# Patient Record
Sex: FEMALE | Race: WHITE | NOT HISPANIC OR LATINO | Employment: FULL TIME | URBAN - METROPOLITAN AREA
[De-identification: names, ages, dates, MRNs, and addresses within clinical notes are randomized per-mention and may not be internally consistent; named-entity substitution may affect disease eponyms.]

---

## 2021-01-06 ENCOUNTER — CONSULT (OUTPATIENT)
Dept: SURGERY | Facility: CLINIC | Age: 61
End: 2021-01-06
Payer: COMMERCIAL

## 2021-01-06 VITALS
DIASTOLIC BLOOD PRESSURE: 62 MMHG | BODY MASS INDEX: 18.78 KG/M2 | SYSTOLIC BLOOD PRESSURE: 118 MMHG | HEART RATE: 94 BPM | HEIGHT: 63 IN | WEIGHT: 106 LBS | TEMPERATURE: 99.8 F

## 2021-01-06 DIAGNOSIS — K63.2 COLOCUTANEOUS FISTULA: Primary | ICD-10-CM

## 2021-01-06 DIAGNOSIS — K57.92 DIVERTICULITIS: ICD-10-CM

## 2021-01-06 PROCEDURE — 99244 OFF/OP CNSLTJ NEW/EST MOD 40: CPT | Performed by: SPECIALIST

## 2021-01-06 RX ORDER — IBUPROFEN 200 MG
200 TABLET ORAL EVERY 6 HOURS PRN
COMMUNITY
End: 2021-06-30 | Stop reason: HOSPADM

## 2021-01-06 RX ORDER — AMOXICILLIN AND CLAVULANATE POTASSIUM 875; 125 MG/1; MG/1
1 TABLET, FILM COATED ORAL EVERY 12 HOURS SCHEDULED
Qty: 28 TABLET | Refills: 0 | Status: SHIPPED | OUTPATIENT
Start: 2021-01-06 | End: 2021-01-20

## 2021-01-06 NOTE — LETTER
January 6, 2021     Zeeshan Plaza, DO  [address] [facility] [address]    Patient: Marizol Daniel   YOB: 1960   Date of Visit: 1/6/2021       Dear Sulma Donahue,     Thank you for referring Sharyn Burch to me for evaluation  Below are my notes for this consultation  If you have questions, please do not hesitate to call me  I look forward to following your patient along with you  Sincerely,    Pedro Pablo Lee MD        CC: No Recipients  Dianne Jung MD  1/6/2021  3:49 PM  Sign when Signing Visit  Chief Complaint:  Complicated diverticulitis with colocutaneous fistula      History of Present Illness:  Patient is a 60-year-old white female with history of complicated sigmoid diverticulitis  Apparently in September of 2020 she began to have left lower quadrant abdominal pain  She presented to the emergency room at [facility] where CT scan demonstrated diverticulitis with a possible fistula toward the inguinal canal   She was treated medically and ultimately discharged  A few days later she return to the emergency room with recurrence and increasing left lower quadrant pain  Repeat CT scan demonstrated an abscess  The patient underwent CT-guided abscess drainage with placement of a drain  She was treated with IV antibiotics switched to oral and discharged  She came back for re-evaluation and repeat CT scan  Initially there was improvement of the abscess but unfortunately it eventually recurred  Repeat IR drainage with a different catheter was performed  Patient underwent abscessogram x2 the last 1 showing almost complete resolution of left lower quadrant diverticular abscess   She eventually underwent drain removal or the drain fell out  Since then she has had persistent drainage from the drain site    She notified her physicians and they said just to keep it covered with a Band-Aid and that it would eventually heal   It has been over a month now and has not stopped  The drainage is brownish clearish fluid several cc a day  She was referred office today by her family physician for consideration of the situation  The patient states her bowels are moving  She denies fever chills or other subjective symptoms of sepsis  Past Medical History: History reviewed  No pertinent past medical history  Past Surgical History:  History reviewed  No pertinent surgical history  Allergies:  No Known Allergies      Medications:    Current Outpatient Medications:     ibuprofen (MOTRIN) 200 mg tablet, Take 200 mg by mouth every 6 (six) hours as needed, Disp: , Rfl:     sertraline (ZOLOFT) 50 mg tablet, Take 100 mg by mouth daily, Disp: , Rfl:     amoxicillin-clavulanate (AUGMENTIN) 875-125 mg per tablet, Take 1 tablet by mouth every 12 (twelve) hours for 14 days, Disp: 28 tablet, Rfl: 0      Social History:  Social History     Social History     Substance and Sexual Activity   Alcohol Use Yes    Alcohol/week: 2 0 standard drinks    Types: 1 Shots of liquor, 1 Cans of beer per week    Frequency: 4 or more times a week    Drinks per session: 5 or 6    Binge frequency: Daily or almost daily     Social History     Substance and Sexual Activity   Drug Use Never     Social History     Tobacco Use   Smoking Status Current Some Day Smoker    Packs/day: 1 00    Types: Cigarettes   Smokeless Tobacco Never Used         Family History:  History reviewed  No pertinent family history  Review of Systems:    As per the HPI  Drainage from drain site  No fever chills night sweats  No weight loss weight gain chest pain nausea vomiting diarrhea constipation shortness of breath dysuria hematuria headaches sore throat blurry vision double vision chronic cough dysuria hematuria etcetera  All other review of systems are negative        Vitals:  Vitals:    01/06/21 1038   BP: 118/62   Pulse: 94   Temp: 99 8 °F (37 7 °C)       Physical Exam:  The patient is diminutive slim white female 5 ft 3 in and 106 lb  She is awake alert in no distress  Vital signs as above  Skin warm dry  Head normocephalic and atraumatic  Eyes ARNEL a m  Intact  Ears nose within normal limits  Throat gag reflex intact  Neck no masses thyromegaly lymphadenopathy palpable  Back no CVA or spinal tenderness  Lungs clear to a and P  Cor regular rate and rhythm no murmurs carotid bruits  Abdomen flat firm  There is a fullness in the left lower quadrant that is somewhat tender to palpation  There is a small opening in the skin with a small amount of watery brownish drainage which is consistent with the former percutaneous drain site  No other tenderness is noted no masses present  Extremities arthritic changes are noted negative CC E  Neurologically A&O x3 cranial nerves 2-12 intact  Lymphatics no lymphadenopathy palpable  Lab Results: I have personally reviewed pertinent reports  See below  Imaging: I have personally reviewed pertinent reports  EKG, Pathology, and Other Studies: I have personally reviewed pertinent reports  No visits with results within 1 Day(s) from this visit  Latest known visit with results is:   No results found for any previous visit  Impression:  Complicated sigmoid diverticulitis with colocutaneous fistula  The concept of diverticulitis is explained to the patient along with diverticular abscess and colocutaneous fistula  Drawings are made to further explain this and she understands  The management of fistulas are discussed and what needs to be done to facilitate their closure  Usually fistulas were given 6-8 weeks to close spontaneously  After that something more aggressive (surgery) needs to be seriously considered  She understands and is actually quite grateful for the explanations    Plan:  Her bowels are moving and she has no distal obstruction  Small amount of drainage is coming through the fistula  Her nutrition seems to be adequate  Maintaining all of the above and adding a course of antibiotics may facilitate the closing of this fistula  There are not many more options at this time short of surgery  A 2 week course of antibiotics as ordered for her and will bring her back in 2 weeks for re-evaluation  Of course if she gets worse she should notify our office  She understands and we will make this so

## 2021-01-06 NOTE — PROGRESS NOTES
Chief Complaint:  Complicated diverticulitis with colocutaneous fistula      History of Present Illness:  Patient is a 64-year-old white female with history of complicated sigmoid diverticulitis  Apparently in September of 2020 she began to have left lower quadrant abdominal pain  She presented to the emergency room at [facility] where CT scan demonstrated diverticulitis with a possible fistula toward the inguinal canal   She was treated medically and ultimately discharged  A few days later she return to the emergency room with recurrence and increasing left lower quadrant pain  Repeat CT scan demonstrated an abscess  The patient underwent CT-guided abscess drainage with placement of a drain  She was treated with IV antibiotics switched to oral and discharged  She came back for re-evaluation and repeat CT scan  Initially there was improvement of the abscess but unfortunately it eventually recurred  Repeat IR drainage with a different catheter was performed  Patient underwent abscessogram x2 the last 1 showing almost complete resolution of left lower quadrant diverticular abscess   She eventually underwent drain removal or the drain fell out  Since then she has had persistent drainage from the drain site  She notified her physicians and they said just to keep it covered with a Band-Aid and that it would eventually heal   It has been over a month now and has not stopped  The drainage is brownish clearish fluid several cc a day  She was referred office today by her family physician for consideration of the situation  The patient states her bowels are moving  She denies fever chills or other subjective symptoms of sepsis  Past Medical History: History reviewed  No pertinent past medical history  Past Surgical History:  History reviewed  No pertinent surgical history        Allergies:  No Known Allergies      Medications:    Current Outpatient Medications:     ibuprofen (MOTRIN) 200 mg tablet, Take 200 mg by mouth every 6 (six) hours as needed, Disp: , Rfl:     sertraline (ZOLOFT) 50 mg tablet, Take 100 mg by mouth daily, Disp: , Rfl:     amoxicillin-clavulanate (AUGMENTIN) 875-125 mg per tablet, Take 1 tablet by mouth every 12 (twelve) hours for 14 days, Disp: 28 tablet, Rfl: 0      Social History:  Social History     Social History     Substance and Sexual Activity   Alcohol Use Yes    Alcohol/week: 2 0 standard drinks    Types: 1 Shots of liquor, 1 Cans of beer per week    Frequency: 4 or more times a week    Drinks per session: 5 or 6    Binge frequency: Daily or almost daily     Social History     Substance and Sexual Activity   Drug Use Never     Social History     Tobacco Use   Smoking Status Current Some Day Smoker    Packs/day: 1 00    Types: Cigarettes   Smokeless Tobacco Never Used         Family History:  History reviewed  No pertinent family history  Review of Systems:    As per the HPI  Drainage from drain site  No fever chills night sweats  No weight loss weight gain chest pain nausea vomiting diarrhea constipation shortness of breath dysuria hematuria headaches sore throat blurry vision double vision chronic cough dysuria hematuria etcetera  All other review of systems are negative  Vitals:  Vitals:    01/06/21 1038   BP: 118/62   Pulse: 94   Temp: 99 8 °F (37 7 °C)       Physical Exam:  The patient is diminutive slim white female 5 ft 3 in and 106 lb  She is awake alert in no distress  Vital signs as above  Skin warm dry  Head normocephalic and atraumatic  Eyes ARNEL a m  Intact  Ears nose within normal limits  Throat gag reflex intact  Neck no masses thyromegaly lymphadenopathy palpable  Back no CVA or spinal tenderness  Lungs clear to a and P  Cor regular rate and rhythm no murmurs carotid bruits  Abdomen flat firm  There is a fullness in the left lower quadrant that is somewhat tender to palpation    There is a small opening in the skin with a small amount of watery brownish drainage which is consistent with the former percutaneous drain site  No other tenderness is noted no masses present  Extremities arthritic changes are noted negative CC E  Neurologically A&O x3 cranial nerves 2-12 intact  Lymphatics no lymphadenopathy palpable  Lab Results: I have personally reviewed pertinent reports  See below  Imaging: I have personally reviewed pertinent reports  EKG, Pathology, and Other Studies: I have personally reviewed pertinent reports  No visits with results within 1 Day(s) from this visit  Latest known visit with results is:   No results found for any previous visit  Impression:  Complicated sigmoid diverticulitis with colocutaneous fistula  The concept of diverticulitis is explained to the patient along with diverticular abscess and colocutaneous fistula  Drawings are made to further explain this and she understands  The management of fistulas are discussed and what needs to be done to facilitate their closure  Usually fistulas were given 6-8 weeks to close spontaneously  After that something more aggressive (surgery) needs to be seriously considered  She understands and is actually quite grateful for the explanations    Plan:  Her bowels are moving and she has no distal obstruction  Small amount of drainage is coming through the fistula  Her nutrition seems to be adequate  Maintaining all of the above and adding a course of antibiotics may facilitate the closing of this fistula  There are not many more options at this time short of surgery  A 2 week course of antibiotics as ordered for her and will bring her back in 2 weeks for re-evaluation  Of course if she gets worse she should notify our office  She understands and we will make this so

## 2021-02-16 ENCOUNTER — OFFICE VISIT (OUTPATIENT)
Dept: SURGERY | Facility: CLINIC | Age: 61
End: 2021-02-16
Payer: COMMERCIAL

## 2021-02-16 VITALS
HEART RATE: 82 BPM | BODY MASS INDEX: 18.43 KG/M2 | TEMPERATURE: 98.6 F | SYSTOLIC BLOOD PRESSURE: 114 MMHG | DIASTOLIC BLOOD PRESSURE: 68 MMHG | HEIGHT: 63 IN | WEIGHT: 104 LBS

## 2021-02-16 DIAGNOSIS — K63.2 COLOCUTANEOUS FISTULA: Primary | ICD-10-CM

## 2021-02-16 DIAGNOSIS — K57.92 DIVERTICULITIS: ICD-10-CM

## 2021-02-16 PROCEDURE — 99213 OFFICE O/P EST LOW 20 MIN: CPT | Performed by: SPECIALIST

## 2021-02-16 RX ORDER — AMOXICILLIN AND CLAVULANATE POTASSIUM 875; 125 MG/1; MG/1
1 TABLET, FILM COATED ORAL EVERY 12 HOURS SCHEDULED
Qty: 28 TABLET | Refills: 0 | Status: SHIPPED | OUTPATIENT
Start: 2021-02-16 | End: 2021-03-02

## 2021-02-16 NOTE — PROGRESS NOTES
Alayna Elise is a 52-year-old white female with a history of complicated sigmoid diverticulitis  She was recently seen in regards to this January 6, 2021  She had a diverticular abscess drain x2 at [facility]   She was dissatisfied with the care there  After the last drainage when the drain was removed she developed drainage from the drain site and an obvious colocutaneous fistula  She was not favor of surgery at that time and the only thing available to her was the placed on antibiotics for a 2 week period   She says the drainage all but stopped by 2 weeks but then slowly recurred  It is primarily cloudy fluid no obvious stool  She is tolerating her diet  Her weight is stable  Her bowels are moving  Physical exam:  Middle-aged cachectic-appearing female who is awake  Alert and in no distress  Abdomen: There is about a 1 cm circular lesion of the left iliac crest with some minimal turbid fluid that is expressible  It is tender to palpation  There is no significant cellulitis around the area  the pathophysiology is once again discussed with her  When she was referred to the scene she was not in favor of surgery  Today she says if that is necessary then so be it  She also has questions   "could this be cancer"  It is most likely related to diverticulitis but she has never had a lower endoscopy  Eventual colonoscopy is essential and this was related to her  Whether she has surgery are does not have surgery she needs to have her colon evaluated to essentially rule out cancer and to evaluate the rest of the colon  She understands  Impression:  Complicated diverticulitis with colocutaneous fistula  Plan: Will place her on a 2 week course of antibiotics once again to see if this ultimately closes the fistula  She is also referred for colonoscopy  We will see her after in about 2 weeks and then proceed from that  She understands    She is quite thankful that weeks plain things to her  A prescription for antibiotics is E scripted to her pharmacy an appointment for colonoscopy is made

## 2021-03-09 ENCOUNTER — OFFICE VISIT (OUTPATIENT)
Dept: SURGERY | Facility: CLINIC | Age: 61
End: 2021-03-09
Payer: COMMERCIAL

## 2021-03-09 VITALS
SYSTOLIC BLOOD PRESSURE: 108 MMHG | BODY MASS INDEX: 18.61 KG/M2 | HEIGHT: 63 IN | WEIGHT: 105 LBS | HEART RATE: 85 BPM | TEMPERATURE: 97.2 F | DIASTOLIC BLOOD PRESSURE: 64 MMHG

## 2021-03-09 DIAGNOSIS — Z12.11 SCREEN FOR COLON CANCER: Primary | ICD-10-CM

## 2021-03-09 DIAGNOSIS — K63.2 COLOCUTANEOUS FISTULA: ICD-10-CM

## 2021-03-09 PROCEDURE — 99204 OFFICE O/P NEW MOD 45 MIN: CPT | Performed by: SURGERY

## 2021-04-12 ENCOUNTER — ANESTHESIA EVENT (OUTPATIENT)
Dept: GASTROENTEROLOGY | Facility: HOSPITAL | Age: 61
End: 2021-04-12

## 2021-04-12 PROBLEM — F32.A DEPRESSION: Status: ACTIVE | Noted: 2021-04-12

## 2021-04-12 PROBLEM — F17.200 SMOKING: Status: ACTIVE | Noted: 2021-04-12

## 2021-04-12 NOTE — ANESTHESIA PREPROCEDURE EVALUATION
Procedure:  COLONOSCOPY    Relevant Problems   ANESTHESIA (within normal limits)      CARDIO (within normal limits)      ENDO (within normal limits)      GI/HEPATIC  bowel prep    pt has stoma      /RENAL (within normal limits)      GYN  PM      HEMATOLOGY (within normal limits)      MUSCULOSKELETAL (within normal limits)      NEURO/PSYCH   (+) Depression      PULMONARY  smoking cessation stressed   (+) Smoking   (-) Sleep apnea   (-) URI (upper respiratory infection)        Physical Exam    Airway    Mallampati score: II  TM Distance: >3 FB  Neck ROM: full     Dental       Cardiovascular  Cardiovascular exam normal    Pulmonary  Pulmonary exam normal     Other Findings  Fixed upper and lower teeth and in good repair      Anesthesia Plan  ASA Score- 2     Anesthesia Type- IV sedation with anesthesia with ASA Monitors  Additional Monitors:   Airway Plan:           Plan Factors-Exercise tolerance (METS): >4 METS  Chart reviewed  EKG reviewed  Existing labs reviewed  Patient summary reviewed  Patient is a current smoker  Patient instructed to abstain from smoking on day of procedure  Patient did not smoke on day of surgery  Obstructive sleep apnea risk education given perioperatively  Induction- intravenous  Postoperative Plan-     Informed Consent- Anesthetic plan and risks discussed with patient and spouse  I personally reviewed this patient with the CRNA  Discussed and agreed on the Anesthesia Plan with the BERTRAND Rae

## 2021-04-14 ENCOUNTER — ANESTHESIA (OUTPATIENT)
Dept: GASTROENTEROLOGY | Facility: HOSPITAL | Age: 61
End: 2021-04-14

## 2021-04-14 ENCOUNTER — HOSPITAL ENCOUNTER (OUTPATIENT)
Dept: GASTROENTEROLOGY | Facility: HOSPITAL | Age: 61
Setting detail: OUTPATIENT SURGERY
Discharge: HOME | End: 2021-04-14
Attending: SURGERY
Payer: COMMERCIAL

## 2021-04-14 VITALS
OXYGEN SATURATION: 97 % | DIASTOLIC BLOOD PRESSURE: 93 MMHG | TEMPERATURE: 97.7 F | SYSTOLIC BLOOD PRESSURE: 177 MMHG | HEART RATE: 72 BPM | RESPIRATION RATE: 16 BRPM

## 2021-04-14 DIAGNOSIS — Z12.11 SCREEN FOR COLON CANCER: ICD-10-CM

## 2021-04-14 PROCEDURE — 88305 TISSUE EXAM BY PATHOLOGIST: CPT | Performed by: PATHOLOGY

## 2021-04-14 PROCEDURE — 45385 COLONOSCOPY W/LESION REMOVAL: CPT | Performed by: SURGERY

## 2021-04-14 RX ORDER — PROPOFOL 10 MG/ML
INJECTION, EMULSION INTRAVENOUS AS NEEDED
Status: DISCONTINUED | OUTPATIENT
Start: 2021-04-14 | End: 2021-04-14

## 2021-04-14 RX ORDER — SODIUM CHLORIDE 9 MG/ML
125 INJECTION, SOLUTION INTRAVENOUS CONTINUOUS
Status: DISCONTINUED | OUTPATIENT
Start: 2021-04-14 | End: 2021-04-18 | Stop reason: HOSPADM

## 2021-04-14 RX ORDER — LIDOCAINE HYDROCHLORIDE 10 MG/ML
INJECTION, SOLUTION EPIDURAL; INFILTRATION; INTRACAUDAL; PERINEURAL AS NEEDED
Status: DISCONTINUED | OUTPATIENT
Start: 2021-04-14 | End: 2021-04-14

## 2021-04-14 RX ADMIN — PROPOFOL 30 MG: 10 INJECTION, EMULSION INTRAVENOUS at 12:55

## 2021-04-14 RX ADMIN — PROPOFOL 30 MG: 10 INJECTION, EMULSION INTRAVENOUS at 12:58

## 2021-04-14 RX ADMIN — PROPOFOL 30 MG: 10 INJECTION, EMULSION INTRAVENOUS at 13:04

## 2021-04-14 RX ADMIN — PROPOFOL 20 MG: 10 INJECTION, EMULSION INTRAVENOUS at 13:01

## 2021-04-14 RX ADMIN — SODIUM CHLORIDE 125 ML/HR: 0.9 INJECTION, SOLUTION INTRAVENOUS at 11:36

## 2021-04-14 RX ADMIN — PROPOFOL 40 MG: 10 INJECTION, EMULSION INTRAVENOUS at 13:07

## 2021-04-14 RX ADMIN — PROPOFOL 30 MG: 10 INJECTION, EMULSION INTRAVENOUS at 13:06

## 2021-04-14 RX ADMIN — LIDOCAINE HYDROCHLORIDE 50 MG: 10 INJECTION, SOLUTION EPIDURAL; INFILTRATION; INTRACAUDAL; PERINEURAL at 12:50

## 2021-04-14 RX ADMIN — PROPOFOL 120 MG: 10 INJECTION, EMULSION INTRAVENOUS at 12:50

## 2021-04-14 RX ADMIN — PROPOFOL 30 MG: 10 INJECTION, EMULSION INTRAVENOUS at 13:10

## 2021-04-14 NOTE — ANESTHESIA POSTPROCEDURE EVALUATION
Post-Op Assessment Note    CV Status:  Stable  Pain Score: 0    Pain management: adequate     Mental Status:  Alert and awake   Hydration Status:  Euvolemic   PONV Controlled:  Controlled   Airway Patency:  Patent      Post Op Vitals Reviewed: Yes      Staff: CRNA         No complications documented      BP   155/90   Temp      Pulse 69   Resp 18   SpO2 100

## 2021-04-14 NOTE — DISCHARGE INSTRUCTIONS
Colonoscopy   WHAT YOU NEED TO KNOW:   A colonoscopy is a procedure to examine the inside of your colon (intestine) with a scope  Polyps or tissue growths may have been removed during your colonoscopy  It is normal to feel bloated and to have some abdominal discomfort  You should be passing gas  If you have hemorrhoids or you had polyps removed, you may have a small amount of bleeding  DISCHARGE INSTRUCTIONS:   Call your doctor if:   · You have a large amount of bright red blood in your bowel movements  · Your abdomen is hard and firm and you have severe pain  · You have sudden trouble breathing  · You develop a rash or hives  · You have a fever within 24 hours of your procedure  · You have not had a bowel movement for 3 days after your procedure  · You have questions or concerns about your condition or care  After your colonoscopy:   · Do not lift, strain, or run  for 3 days  · Rest as much as possible  You have been given medicine to relax you  Do not  drive or make important decisions for at least 24 hours  Return to your normal activity as directed  · Relieve gas and discomfort from bloating  by lying on your left side with a heating pad on your abdomen  You may need to take short walks to help the gas move out  Eat small meals until bloating is relieved  If you had polyps removed: For 7 days after your procedure:  · Do not  take aspirin  · Do not  go on long car rides  Help prevent constipation:   · Eat a variety of healthy foods  Healthy foods include fruit, vegetables, whole-grain breads, low-fat dairy products, beans, lean meat, and fish  Ask if you need to be on a special diet  Your healthcare provider may recommend that you eat high-fiber foods such as cooked beans  Fiber helps you have regular bowel movements  · Drink liquids as directed  Adults should drink between 9 and 13 eight-ounce cups of liquid every day  Ask what amount is best for you   For most people, good liquids to drink are water, juice, and milk  · Exercise as directed  Talk to your healthcare provider about the best exercise plan for you  Exercise can help prevent constipation, decrease your blood pressure and improve your health  Follow up with your healthcare provider as directed:  Write down your questions so you remember to ask them during your visits  © Copyright [address] Information is for End User's use only and may not be sold, redistributed or otherwise used for commercial purposes  All illustrations and images included in CareNotes® are the copyrighted property of A D A M , Inc  or [address] [place], [place] [address]andrew Martell   The above information is an  only  It is not intended as medical advice for individual conditions or treatments  Talk to your doctor, nurse or pharmacist before following any medical regimen to see if it is safe and effective for you

## 2021-04-14 NOTE — H&P
Assessment/Plan:    She has a colocutaneous fistula after diverticulitis  No previous lower endoscopy  Discussed risks of the procedure including abdominal pain/ bloating, bleeding and very low risk of perforation  Discussed possibility of diagnosis of colon cancer in the setting of diverticulitis  She is interested in surgical resection so this could potentially be coordinated as a preop screening  Discussed bowel prep and instructions were given  We will send a prescription for Suprep to her pharmacy      No problem-specific Assessment & Plan notes found for this encounter          Diagnoses and all orders for this visit:     Screen for colon cancer  -     Colonoscopy; Future     Colocutaneous fistula     Other orders  -     Diet NPO; Sips with meds; Standing  -     Void on call to OR; Standing  -     Insert peripheral IV; Standing            Subjective:       Patient ID: Bridget Moya is a 62 y o  female       She presents with colocutaneous fistula after recent diagnosis of diverticulitis  She has never had a colonoscopy  She reports daily drainage from the colo cutaneous fistula that is light brown or tan color, increases if she is more active  Does not change with diet  She reports a 20 lb weight loss over the last 6-8 months  No blood in her stool, rectal pain or current abdominal pain  No family history of colon cancer          The following portions of the patient's history were reviewed and updated as appropriate:   She  has no past medical history on file  She There are no active problems to display for this patient      She  has no past surgical history on file  Her family history is not on file  She  reports that she has been smoking cigarettes  She has been smoking about 1 00 pack per day  She has never used smokeless tobacco  She reports current alcohol use of about 2 0 standard drinks of alcohol per week  She reports that she does not use drugs           Current Outpatient Medications Medication Sig Dispense Refill    ibuprofen (MOTRIN) 200 mg tablet Take 200 mg by mouth every 6 (six) hours as needed        sertraline (ZOLOFT) 50 mg tablet Take 100 mg by mouth daily          No current facility-administered medications for this visit        She has No Known Allergies        Review of Systems   Skin: Positive for wound  All other systems reviewed and are negative          Objective:        /64 (BP Location: Left arm, Patient Position: Sitting, Cuff Size: Adult)   Pulse 85   Temp (!) 97 2 °F (36 2 °C) (Tympanic)   Ht 5' 3" (1 6 m)   Wt 47 6 kg (105 lb)   BMI 18 60 kg/m²             Physical Exam  Vitals signs reviewed  Constitutional:       General: She is not in acute distress  Appearance: Normal appearance  She is not toxic-appearing  HENT:      Head: Normocephalic and atraumatic  Eyes:      Extraocular Movements: Extraocular movements intact  Conjunctiva/sclera: Conjunctivae normal       Pupils: Pupils are equal, round, and reactive to light  Neck:      Musculoskeletal: Normal range of motion and neck supple  Cardiovascular:      Rate and Rhythm: Normal rate and regular rhythm  Pulmonary:      Effort: Pulmonary effort is normal  No respiratory distress  Breath sounds: Normal breath sounds  Abdominal:      General: Abdomen is flat  Palpations: Abdomen is soft  There is mass  Tenderness: There is no abdominal tenderness  There is no guarding  Musculoskeletal: Normal range of motion  General: No swelling or tenderness  Skin:     General: Skin is warm and dry  Neurological:      General: No focal deficit present  Mental Status: She is alert and oriented to person, place, and time  Psychiatric:         Mood and Affect: Mood normal          Behavior: Behavior normal          Thought Content:  Thought content normal          Judgment: Judgment normal

## 2021-05-11 ENCOUNTER — OFFICE VISIT (OUTPATIENT)
Dept: SURGERY | Facility: CLINIC | Age: 61
End: 2021-05-11
Payer: COMMERCIAL

## 2021-05-11 VITALS
SYSTOLIC BLOOD PRESSURE: 108 MMHG | HEART RATE: 84 BPM | HEIGHT: 63 IN | BODY MASS INDEX: 18.43 KG/M2 | WEIGHT: 104 LBS | DIASTOLIC BLOOD PRESSURE: 72 MMHG | TEMPERATURE: 99 F

## 2021-05-11 DIAGNOSIS — K57.92 DIVERTICULITIS: ICD-10-CM

## 2021-05-11 DIAGNOSIS — K63.2 COLOCUTANEOUS FISTULA: Primary | ICD-10-CM

## 2021-05-11 PROCEDURE — 99214 OFFICE O/P EST MOD 30 MIN: CPT | Performed by: SPECIALIST

## 2021-05-11 NOTE — LETTER
May 11, 2021     Ruby Riggs DO  [address] [facility] [id number]    Patient: Bruce Stout   YOB: 1960   Date of Visit: 5/11/2021       Dear   Ivone Mcclellan,    Thank you for referring Jeff Montemayor to me for evaluation  Below are the relevant portions of my H&P  If you have questions, please do not hesitate to call me  I look forward to following Marlene along with you  Sincerely,     Nenita Correa MD        CC: No Recipients  Veronica Mcintosh MD  5/11/2021  6:16 PM  Signed  Chief Complaint:  Complicated diverticulitis with colocutaneous fistula        History of Present Illness:  Patient is a 27-year-old white female with history of complicated sigmoid diverticulitis  Apparently in September of 2020 she began to have left lower quadrant abdominal pain  She presented to the emergency room at [facility] where CT scan demonstrated diverticulitis with a possible fistula toward the inguinal canal   She was treated medically and ultimately discharged  A few days later she return to the emergency room with recurrence and increasing left lower quadrant pain  Repeat CT scan demonstrated an abscess  The patient underwent CT-guided abscess drainage with placement of a drain  She was treated with IV antibiotics switched to oral and discharged  She came back for re-evaluation and repeat CT scan  Initially there was improvement of the abscess but unfortunately it eventually recurred  Repeat IR drainage with a different catheter was performed  Patient underwent abscessogram x2 the last 1 showing almost complete resolution of left lower quadrant diverticular abscess   She eventually underwent drain removal or the drain fell out  Since then she has had persistent drainage from the drain site    She notified her physicians and they said just to keep it covered with a Band-Aid and that it would eventually heal  Unfortunately it did not        She was referred office  by her family physician for consideration of the situation      The patient states her bowels are moving  She denies fever chills or other subjective symptoms of sepsis        She was initially treated with some oral antibiotics to see if the fistula  would spontaneously close  Unfortunately it did not  Subsequent she was scheduled for colonoscopy but colonoscopy was unsuccessful due to a stricture in the sigmoid colon  She was originally again surgery but unfortunately that is only recourse at this time and she is here today to discuss scheduling surgery  Past Medical History:   Medical History   History reviewed  No pertinent past medical history            Past Surgical History:    Surgical History   History reviewed  No pertinent surgical history            Allergies:  No Known Allergies        Medications:    Current Outpatient Medications:     ibuprofen (MOTRIN) 200 mg tablet, Take 200 mg by mouth every 6 (six) hours as needed, Disp: , Rfl:     sertraline (ZOLOFT) 50 mg tablet, Take 100 mg by mouth daily, Disp: , Rfl:     amoxicillin-clavulanate (AUGMENTIN) 875-125 mg per tablet, Take 1 tablet by mouth every 12 (twelve) hours for 14 days, Disp: 28 tablet, Rfl: 0        Social History:    Social History            Social History           Substance and Sexual Activity   Alcohol Use Yes    Alcohol/week: 2 0 standard drinks    Types: 1 Shots of liquor, 1 Cans of beer per week    Frequency: 4 or more times a week    Drinks per session: 5 or 6    Binge frequency: Daily or almost daily      Social History          Substance and Sexual Activity   Drug Use Never      Social History           Tobacco Use   Smoking Status Current Some Day Smoker    Packs/day: 1 00    Types: Cigarettes   Smokeless Tobacco Never Used            Family History:  History reviewed  No pertinent family history         Review of Systems:    As per the HPI  Drainage from drain site  No fever chills night sweats    No weight loss weight gain chest pain nausea vomiting diarrhea constipation shortness of breath dysuria hematuria headaches sore throat blurry vision double vision chronic cough dysuria hematuria etcetera  All other review of systems are negative         Vitals:      Vitals:     01/06/21 1038   BP: 118/62   Pulse: 94   Temp: 99 8 °F (37 7 °C)         Physical Exam:  The patient is diminutive slim white female 5 ft 3 in and 106 lb  She is awake alert in no distress  Vital signs as above  Skin warm dry  Head normocephalic and atraumatic  Eyes ARNEL a m  Intact  Ears nose within normal limits  Throat gag reflex intact  Neck no masses thyromegaly lymphadenopathy palpable  Back no CVA or spinal tenderness  Lungs clear to a and P  Cor regular rate and rhythm no murmurs carotid bruits  Abdomen flat firm  There is a fullness in the left lower quadrant that is somewhat tender to palpation  There is a small opening in the skin with a small amount of watery brownish drainage which is consistent with the former percutaneous drain site  No other tenderness is noted no masses present  Extremities arthritic changes are noted negative CC E  Neurologically A&O x3 cranial nerves 2-12 intact  Lymphatics no lymphadenopathy palpable         Lab Results: I have personally reviewed pertinent reports  See below  Imaging: I have personally reviewed pertinent reports  EKG, Pathology, and Other Studies: I have personally reviewed pertinent reports       No visits with results within 1 Day(s) from this visit  Latest known visit with results is:   No results found for any previous visit             Impression:  Complicated sigmoid diverticulitis with colocutaneous fistula      The concept of diverticulitis is explained to the patient along with diverticular abscess and colocutaneous fistula    Drawings are made to further explain this and she understands       The management of fistulas are discussed and what needs to be done to facilitate their closure  Usually fistulas were given 6-8 weeks to close spontaneously  After that something more aggressive (surgery) needs to be seriously considered      She understands and is actually quite grateful for the explanations     Plan: At this point her only recourse is surgery  We will attempt to prep her bowel  We will attempt laparoscopic sigmoid colon resection at the earliest possible date

## 2021-05-11 NOTE — H&P
Chief Complaint:  Complicated diverticulitis with colocutaneous fistula        History of Present Illness:  Patient is a 41-year-old white female with history of complicated sigmoid diverticulitis  Apparently in September of 2020 she began to have left lower quadrant abdominal pain  She presented to the emergency room at [facility] where CT scan demonstrated diverticulitis with a possible fistula toward the inguinal canal   She was treated medically and ultimately discharged  A few days later she return to the emergency room with recurrence and increasing left lower quadrant pain  Repeat CT scan demonstrated an abscess  The patient underwent CT-guided abscess drainage with placement of a drain  She was treated with IV antibiotics switched to oral and discharged  She came back for re-evaluation and repeat CT scan  Initially there was improvement of the abscess but unfortunately it eventually recurred  Repeat IR drainage with a different catheter was performed  Patient underwent abscessogram x2 the last 1 showing almost complete resolution of left lower quadrant diverticular abscess   She eventually underwent drain removal or the drain fell out  Since then she has had persistent drainage from the drain site  She notified her physicians and they said just to keep it covered with a Band-Aid and that it would eventually heal  Unfortunately it did not        She was referred office  by her family physician for consideration of the situation      The patient states her bowels are moving  She denies fever chills or other subjective symptoms of sepsis        She was initially treated with some oral antibiotics to see if the fistula  would spontaneously close  Unfortunately it did not  Subsequent she was scheduled for colonoscopy but colonoscopy was unsuccessful due to a stricture in the sigmoid colon      She was originally again surgery but unfortunately that is only recourse at this time and she is here today to discuss scheduling surgery  Past Medical History:   Medical History   History reviewed  No pertinent past medical history            Past Surgical History:    Surgical History   History reviewed  No pertinent surgical history            Allergies:  No Known Allergies        Medications:    Current Outpatient Medications:     ibuprofen (MOTRIN) 200 mg tablet, Take 200 mg by mouth every 6 (six) hours as needed, Disp: , Rfl:     sertraline (ZOLOFT) 50 mg tablet, Take 100 mg by mouth daily, Disp: , Rfl:     amoxicillin-clavulanate (AUGMENTIN) 875-125 mg per tablet, Take 1 tablet by mouth every 12 (twelve) hours for 14 days, Disp: 28 tablet, Rfl: 0        Social History:    Social History            Social History           Substance and Sexual Activity   Alcohol Use Yes    Alcohol/week: 2 0 standard drinks    Types: 1 Shots of liquor, 1 Cans of beer per week    Frequency: 4 or more times a week    Drinks per session: 5 or 6    Binge frequency: Daily or almost daily      Social History          Substance and Sexual Activity   Drug Use Never      Social History           Tobacco Use   Smoking Status Current Some Day Smoker    Packs/day: 1 00    Types: Cigarettes   Smokeless Tobacco Never Used            Family History:  History reviewed  No pertinent family history         Review of Systems:    As per the HPI  Drainage from drain site  No fever chills night sweats  No weight loss weight gain chest pain nausea vomiting diarrhea constipation shortness of breath dysuria hematuria headaches sore throat blurry vision double vision chronic cough dysuria hematuria etcetera  All other review of systems are negative         Vitals:      Vitals:     01/06/21 1038   BP: 118/62   Pulse: 94   Temp: 99 8 °F (37 7 °C)         Physical Exam:  The patient is diminutive slim white female 5 ft 3 in and 106 lb    She is awake alert in no distress  Vital signs as above  Skin warm dry  Head normocephalic and atraumatic  Eyes ARNEL a m  Intact  Ears nose within normal limits  Throat gag reflex intact  Neck no masses thyromegaly lymphadenopathy palpable  Back no CVA or spinal tenderness  Lungs clear to a and P  Cor regular rate and rhythm no murmurs carotid bruits  Abdomen flat firm  There is a fullness in the left lower quadrant that is somewhat tender to palpation  There is a small opening in the skin with a small amount of watery brownish drainage which is consistent with the former percutaneous drain site  No other tenderness is noted no masses present  Extremities arthritic changes are noted negative CC E  Neurologically A&O x3 cranial nerves 2-12 intact  Lymphatics no lymphadenopathy palpable         Lab Results: I have personally reviewed pertinent reports  See below  Imaging: I have personally reviewed pertinent reports  EKG, Pathology, and Other Studies: I have personally reviewed pertinent reports       No visits with results within 1 Day(s) from this visit  Latest known visit with results is:   No results found for any previous visit             Impression:  Complicated sigmoid diverticulitis with colocutaneous fistula      The concept of diverticulitis is explained to the patient along with diverticular abscess and colocutaneous fistula  Drawings are made to further explain this and she understands       The management of fistulas are discussed and what needs to be done to facilitate their closure  Usually fistulas were given 6-8 weeks to close spontaneously  After that something more aggressive (surgery) needs to be seriously considered      She understands and is actually quite grateful for the explanations     Plan: At this point her only recourse is surgery  We will attempt to prep her bowel  We will attempt laparoscopic sigmoid colon resection at the earliest possible date

## 2021-06-08 ENCOUNTER — ANESTHESIA EVENT (OUTPATIENT)
Dept: PERIOP | Facility: HOSPITAL | Age: 61
DRG: 329 | End: 2021-06-08
Payer: COMMERCIAL

## 2021-06-08 NOTE — PRE-PROCEDURE INSTRUCTIONS
Pre-Surgery Instructions:   Medication Instructions    ibuprofen (MOTRIN) 200 mg tablet Instructed to avoid all ASA/NSAIDs and OTC Vit/Supp from now until after surgery  Tylenol ok prn    sertraline (ZOLOFT) 50 mg tablet Instructed to take per normal schedule including DOS with sips water    Pre op,medications and showering instructions reviewed-Patient has hibiclens  Instructed to avoid all ASA/NSAIDs and OTC Vit/Supp from now until after surgery  Tylenol ok prn  Pt does have Bowel Prep instructions  Pt  Verbalized an understanding of all instructions reviewed and offers no concerns at this time

## 2021-06-18 ENCOUNTER — CLINICAL SUPPORT (OUTPATIENT)
Dept: URGENT CARE | Facility: MEDICAL CENTER | Age: 61
End: 2021-06-18
Payer: COMMERCIAL

## 2021-06-18 DIAGNOSIS — K63.2 COLOCUTANEOUS FISTULA: ICD-10-CM

## 2021-06-18 LAB
ATRIAL RATE: 86 BPM
P AXIS: 74 DEGREES
PR INTERVAL: 146 MS
QRS AXIS: 74 DEGREES
QRSD INTERVAL: 78 MS
QT INTERVAL: 390 MS
QTC INTERVAL: 466 MS
T WAVE AXIS: 62 DEGREES
VENTRICULAR RATE: 86 BPM

## 2021-06-18 PROCEDURE — U0003 INFECTIOUS AGENT DETECTION BY NUCLEIC ACID (DNA OR RNA); SEVERE ACUTE RESPIRATORY SYNDROME CORONAVIRUS 2 (SARS-COV-2) (CORONAVIRUS DISEASE [COVID-19]), AMPLIFIED PROBE TECHNIQUE, MAKING USE OF HIGH THROUGHPUT TECHNOLOGIES AS DESCRIBED BY CMS-2020-01-R: HCPCS | Performed by: SPECIALIST

## 2021-06-18 PROCEDURE — 93010 ELECTROCARDIOGRAM REPORT: CPT | Performed by: INTERNAL MEDICINE

## 2021-06-18 PROCEDURE — U0005 INFEC AGEN DETEC AMPLI PROBE: HCPCS | Performed by: SPECIALIST

## 2021-06-18 PROCEDURE — 93005 ELECTROCARDIOGRAM TRACING: CPT

## 2021-06-19 PROBLEM — E78.5 HYPERLIPIDEMIA: Status: ACTIVE | Noted: 2021-06-19

## 2021-06-19 PROBLEM — F41.9 ANXIETY: Status: ACTIVE | Noted: 2021-06-19

## 2021-06-19 NOTE — ANESTHESIA PREPROCEDURE EVALUATION
Procedure:  RESECTION COLON SIGMOID LAPAROSCOPIC (N/A Abdomen)    Relevant Problems   ANESTHESIA (within normal limits)      CARDIO   (+) Hyperlipidemia   (-) Chest pain   (-) Dysrhythmias   (-) HTN (hypertension)      ENDO (within normal limits)      /RENAL (within normal limits)      MUSCULOSKELETAL (within normal limits)      NEURO/PSYCH   (+) Anxiety   (+) Depression      PULMONARY   (+) Smoking (1PPD for many years )        Physical Exam    Airway    Mallampati score: II  TM Distance: >3 FB  Neck ROM: full     Dental   Comment: No upper teeth   poor oral hygiene ,     Cardiovascular  Cardiovascular exam normal    Pulmonary  Pulmonary exam normal     Other Findings        Anesthesia Plan  ASA Score- 3     Anesthesia Type- general with ASA Monitors  Additional Monitors:   Airway Plan: ETT  Plan Factors-Exercise tolerance (METS): >4 METS  Chart reviewed  EKG reviewed  Existing labs reviewed  Patient is a current smoker  Patient instructed to abstain from smoking on day of procedure  Patient smoked on day of surgery  Induction- intravenous  Postoperative Plan- Plan for postoperative opioid use  Informed Consent- Anesthetic plan and risks discussed with patient  I personally reviewed this patient with the CRNA  Discussed and agreed on the Anesthesia Plan with the CRNA  Harshil Hassan         No results found for: HGBA1C    No results found for: NA, K, CL, CO2, ANIONGAP, BUN, CREATININE, GLUCOSE, GLUF, CALCIUM, CORRECTEDCA, AST, ALT, ALKPHOS, PROT, BILITOT, EGFR    No results found for: WBC, HGB, HCT, MCV, PLT  Cbc and cmp reviewed     Narrative & Impression   Normal sinus rhythm  Normal ECG  No previous ECGs available  Confirmed by Sisi Dobbins ([id number]) on 6/18/2021 1:41:12      covid negative       Labs reviewed

## 2021-06-21 ENCOUNTER — ANESTHESIA (OUTPATIENT)
Dept: PERIOP | Facility: HOSPITAL | Age: 61
DRG: 329 | End: 2021-06-21
Payer: COMMERCIAL

## 2021-06-21 ENCOUNTER — HOSPITAL ENCOUNTER (INPATIENT)
Facility: HOSPITAL | Age: 61
LOS: 4 days | DRG: 329 | End: 2021-06-25
Attending: SPECIALIST | Admitting: SPECIALIST
Payer: COMMERCIAL

## 2021-06-21 DIAGNOSIS — J96.01 ACUTE RESPIRATORY FAILURE WITH HYPOXIA (HCC): Primary | ICD-10-CM

## 2021-06-21 DIAGNOSIS — K63.2 COLOCUTANEOUS FISTULA: ICD-10-CM

## 2021-06-21 DIAGNOSIS — R77.8 ELEVATED TROPONIN: ICD-10-CM

## 2021-06-21 PROBLEM — K57.20 PERFORATION OF SIGMOID COLON DUE TO DIVERTICULITIS: Status: ACTIVE | Noted: 2021-06-21

## 2021-06-21 LAB
ABO GROUP BLD: NORMAL
ABO GROUP BLD: NORMAL
ALBUMIN SERPL BCP-MCNC: 2 G/DL (ref 3–5.2)
ALP SERPL-CCNC: 42 U/L (ref 43–122)
ALT SERPL W P-5'-P-CCNC: 15 U/L
ANION GAP SERPL CALCULATED.3IONS-SCNC: 6 MMOL/L (ref 5–14)
ANISOCYTOSIS BLD QL SMEAR: PRESENT
AST SERPL W P-5'-P-CCNC: 31 U/L (ref 14–36)
BASOPHILS # BLD AUTO: 0 THOUSANDS/ΜL (ref 0–0.1)
BASOPHILS NFR BLD AUTO: 0 % (ref 0–1)
BILIRUB SERPL-MCNC: 0.26 MG/DL
BLD GP AB SCN SERPL QL: NEGATIVE
BUN SERPL-MCNC: 11 MG/DL (ref 5–25)
CALCIUM ALBUM COR SERPL-MCNC: 9.4 MG/DL (ref 8.3–10.1)
CALCIUM SERPL-MCNC: 7.8 MG/DL (ref 8.4–10.2)
CHLORIDE SERPL-SCNC: 105 MMOL/L (ref 97–108)
CO2 SERPL-SCNC: 24 MMOL/L (ref 22–30)
CREAT SERPL-MCNC: 0.59 MG/DL (ref 0.6–1.2)
EOSINOPHIL # BLD AUTO: 0 THOUSAND/ΜL (ref 0–0.4)
EOSINOPHIL NFR BLD AUTO: 0 % (ref 0–6)
ERYTHROCYTE [DISTWIDTH] IN BLOOD BY AUTOMATED COUNT: 15.9 %
GFR SERPL CREATININE-BSD FRML MDRD: 100 ML/MIN/1.73SQ M
GLUCOSE P FAST SERPL-MCNC: 120 MG/DL (ref 70–99)
GLUCOSE SERPL-MCNC: 120 MG/DL (ref 70–99)
HCT VFR BLD AUTO: 23.8 % (ref 36–46)
HGB BLD-MCNC: 8.1 G/DL (ref 12–16)
HYPERCHROMIA BLD QL SMEAR: PRESENT
LYMPHOCYTES # BLD AUTO: 0.4 THOUSANDS/ΜL (ref 0.5–4)
LYMPHOCYTES NFR BLD AUTO: 8 % (ref 25–45)
MCH RBC QN AUTO: 38 PG (ref 26–34)
MCHC RBC AUTO-ENTMCNC: 34.1 G/DL (ref 31–36)
MCV RBC AUTO: 112 FL (ref 80–100)
MICROCYTES BLD QL AUTO: PRESENT
MONOCYTES # BLD AUTO: 0.4 THOUSAND/ΜL (ref 0.2–0.9)
MONOCYTES NFR BLD AUTO: 7 % (ref 1–10)
NEUTROPHILS # BLD AUTO: 4.8 THOUSANDS/ΜL (ref 1.8–7.8)
NEUTS SEG NFR BLD AUTO: 85 % (ref 45–65)
PLATELET # BLD AUTO: 236 THOUSANDS/UL (ref 150–450)
PLATELET BLD QL SMEAR: ADEQUATE
PMV BLD AUTO: 6.6 FL (ref 8.9–12.7)
POTASSIUM SERPL-SCNC: 2.9 MMOL/L (ref 3.6–5)
PROT SERPL-MCNC: 4.2 G/DL (ref 5.9–8.4)
RBC # BLD AUTO: 2.13 MILLION/UL (ref 4–5.2)
RBC MORPH BLD: NORMAL
RH BLD: POSITIVE
RH BLD: POSITIVE
SODIUM SERPL-SCNC: 135 MMOL/L (ref 137–147)
SPECIMEN EXPIRATION DATE: NORMAL
WBC # BLD AUTO: 5.7 THOUSAND/UL (ref 4.5–11)

## 2021-06-21 PROCEDURE — 44139 MOBILIZATION OF COLON: CPT | Performed by: SPECIALIST

## 2021-06-21 PROCEDURE — 88305 TISSUE EXAM BY PATHOLOGIST: CPT | Performed by: PATHOLOGY

## 2021-06-21 PROCEDURE — 88307 TISSUE EXAM BY PATHOLOGIST: CPT | Performed by: PATHOLOGY

## 2021-06-21 PROCEDURE — C9113 INJ PANTOPRAZOLE SODIUM, VIA: HCPCS | Performed by: SPECIALIST

## 2021-06-21 PROCEDURE — NC001 PR NO CHARGE: Performed by: SPECIALIST

## 2021-06-21 PROCEDURE — 0DNN4ZZ RELEASE SIGMOID COLON, PERCUTANEOUS ENDOSCOPIC APPROACH: ICD-10-PCS | Performed by: SPECIALIST

## 2021-06-21 PROCEDURE — 86901 BLOOD TYPING SEROLOGIC RH(D): CPT | Performed by: ANESTHESIOLOGY

## 2021-06-21 PROCEDURE — 88342 IMHCHEM/IMCYTCHM 1ST ANTB: CPT | Performed by: PATHOLOGY

## 2021-06-21 PROCEDURE — 88341 IMHCHEM/IMCYTCHM EA ADD ANTB: CPT | Performed by: PATHOLOGY

## 2021-06-21 PROCEDURE — 85025 COMPLETE CBC W/AUTO DIFF WBC: CPT | Performed by: SPECIALIST

## 2021-06-21 PROCEDURE — 0DBN0ZZ EXCISION OF SIGMOID COLON, OPEN APPROACH: ICD-10-PCS | Performed by: SPECIALIST

## 2021-06-21 PROCEDURE — 44140 PARTIAL REMOVAL OF COLON: CPT | Performed by: SPECIALIST

## 2021-06-21 PROCEDURE — 80053 COMPREHEN METABOLIC PANEL: CPT | Performed by: SPECIALIST

## 2021-06-21 PROCEDURE — 86920 COMPATIBILITY TEST SPIN: CPT

## 2021-06-21 PROCEDURE — 86850 RBC ANTIBODY SCREEN: CPT | Performed by: ANESTHESIOLOGY

## 2021-06-21 PROCEDURE — 86900 BLOOD TYPING SEROLOGIC ABO: CPT | Performed by: ANESTHESIOLOGY

## 2021-06-21 PROCEDURE — 0UT60ZZ RESECTION OF LEFT FALLOPIAN TUBE, OPEN APPROACH: ICD-10-PCS | Performed by: SPECIALIST

## 2021-06-21 RX ORDER — DEXAMETHASONE SODIUM PHOSPHATE 10 MG/ML
INJECTION, SOLUTION INTRAMUSCULAR; INTRAVENOUS AS NEEDED
Status: DISCONTINUED | OUTPATIENT
Start: 2021-06-21 | End: 2021-06-21

## 2021-06-21 RX ORDER — ONDANSETRON 2 MG/ML
INJECTION INTRAMUSCULAR; INTRAVENOUS AS NEEDED
Status: DISCONTINUED | OUTPATIENT
Start: 2021-06-21 | End: 2021-06-21

## 2021-06-21 RX ORDER — LIDOCAINE HYDROCHLORIDE 10 MG/ML
INJECTION, SOLUTION EPIDURAL; INFILTRATION; INTRACAUDAL; PERINEURAL AS NEEDED
Status: DISCONTINUED | OUTPATIENT
Start: 2021-06-21 | End: 2021-06-21

## 2021-06-21 RX ORDER — PROMETHAZINE HYDROCHLORIDE 25 MG/ML
12.5 INJECTION, SOLUTION INTRAMUSCULAR; INTRAVENOUS ONCE AS NEEDED
Status: DISCONTINUED | OUTPATIENT
Start: 2021-06-21 | End: 2021-06-21 | Stop reason: HOSPADM

## 2021-06-21 RX ORDER — HYDROMORPHONE HCL/PF 1 MG/ML
0.5 SYRINGE (ML) INJECTION
Status: DISCONTINUED | OUTPATIENT
Start: 2021-06-21 | End: 2021-06-21 | Stop reason: HOSPADM

## 2021-06-21 RX ORDER — POTASSIUM CHLORIDE 14.9 MG/ML
20 INJECTION INTRAVENOUS ONCE
Status: COMPLETED | OUTPATIENT
Start: 2021-06-21 | End: 2021-06-22

## 2021-06-21 RX ORDER — HEPARIN SODIUM 5000 [USP'U]/ML
5000 INJECTION, SOLUTION INTRAVENOUS; SUBCUTANEOUS EVERY 8 HOURS SCHEDULED
Status: DISCONTINUED | OUTPATIENT
Start: 2021-06-21 | End: 2021-06-25 | Stop reason: HOSPADM

## 2021-06-21 RX ORDER — SODIUM CHLORIDE, SODIUM LACTATE, POTASSIUM CHLORIDE, CALCIUM CHLORIDE 600; 310; 30; 20 MG/100ML; MG/100ML; MG/100ML; MG/100ML
125 INJECTION, SOLUTION INTRAVENOUS CONTINUOUS
Status: DISCONTINUED | OUTPATIENT
Start: 2021-06-21 | End: 2021-06-21

## 2021-06-21 RX ORDER — POTASSIUM CHLORIDE 14.9 MG/ML
20 INJECTION INTRAVENOUS ONCE
Status: DISCONTINUED | OUTPATIENT
Start: 2021-06-21 | End: 2021-06-21

## 2021-06-21 RX ORDER — SODIUM CHLORIDE, SODIUM LACTATE, POTASSIUM CHLORIDE, CALCIUM CHLORIDE 600; 310; 30; 20 MG/100ML; MG/100ML; MG/100ML; MG/100ML
INJECTION, SOLUTION INTRAVENOUS CONTINUOUS PRN
Status: DISCONTINUED | OUTPATIENT
Start: 2021-06-21 | End: 2021-06-21

## 2021-06-21 RX ORDER — MAGNESIUM HYDROXIDE 1200 MG/15ML
LIQUID ORAL AS NEEDED
Status: DISCONTINUED | OUTPATIENT
Start: 2021-06-21 | End: 2021-06-21 | Stop reason: HOSPADM

## 2021-06-21 RX ORDER — LABETALOL 20 MG/4 ML (5 MG/ML) INTRAVENOUS SYRINGE
AS NEEDED
Status: DISCONTINUED | OUTPATIENT
Start: 2021-06-21 | End: 2021-06-21

## 2021-06-21 RX ORDER — SODIUM CHLORIDE 9 MG/ML
INJECTION, SOLUTION INTRAVENOUS AS NEEDED
Status: DISCONTINUED | OUTPATIENT
Start: 2021-06-21 | End: 2021-06-21 | Stop reason: HOSPADM

## 2021-06-21 RX ORDER — ROCURONIUM BROMIDE 10 MG/ML
INJECTION, SOLUTION INTRAVENOUS AS NEEDED
Status: DISCONTINUED | OUTPATIENT
Start: 2021-06-21 | End: 2021-06-21

## 2021-06-21 RX ORDER — ALBUMIN, HUMAN INJ 5% 5 %
12.5 SOLUTION INTRAVENOUS ONCE
Status: COMPLETED | OUTPATIENT
Start: 2021-06-21 | End: 2021-06-21

## 2021-06-21 RX ORDER — SODIUM CHLORIDE, SODIUM LACTATE, POTASSIUM CHLORIDE, CALCIUM CHLORIDE 600; 310; 30; 20 MG/100ML; MG/100ML; MG/100ML; MG/100ML
50 INJECTION, SOLUTION INTRAVENOUS CONTINUOUS
Status: DISCONTINUED | OUTPATIENT
Start: 2021-06-21 | End: 2021-06-22

## 2021-06-21 RX ORDER — NICOTINE 21 MG/24HR
14 PATCH, TRANSDERMAL 24 HOURS TRANSDERMAL DAILY
Status: DISCONTINUED | OUTPATIENT
Start: 2021-06-22 | End: 2021-06-25 | Stop reason: HOSPADM

## 2021-06-21 RX ORDER — GLYCOPYRROLATE 0.2 MG/ML
INJECTION INTRAMUSCULAR; INTRAVENOUS AS NEEDED
Status: DISCONTINUED | OUTPATIENT
Start: 2021-06-21 | End: 2021-06-21

## 2021-06-21 RX ORDER — PANTOPRAZOLE SODIUM 40 MG/1
40 INJECTION, POWDER, FOR SOLUTION INTRAVENOUS
Status: DISCONTINUED | OUTPATIENT
Start: 2021-06-21 | End: 2021-06-25 | Stop reason: HOSPADM

## 2021-06-21 RX ORDER — CEFAZOLIN SODIUM 2 G/50ML
2000 SOLUTION INTRAVENOUS EVERY 8 HOURS
Status: COMPLETED | OUTPATIENT
Start: 2021-06-21 | End: 2021-06-22

## 2021-06-21 RX ORDER — FENTANYL CITRATE 50 UG/ML
INJECTION, SOLUTION INTRAMUSCULAR; INTRAVENOUS AS NEEDED
Status: DISCONTINUED | OUTPATIENT
Start: 2021-06-21 | End: 2021-06-21

## 2021-06-21 RX ORDER — NEOSTIGMINE METHYLSULFATE 1 MG/ML
INJECTION INTRAVENOUS AS NEEDED
Status: DISCONTINUED | OUTPATIENT
Start: 2021-06-21 | End: 2021-06-21

## 2021-06-21 RX ORDER — MIDAZOLAM HYDROCHLORIDE 2 MG/2ML
INJECTION, SOLUTION INTRAMUSCULAR; INTRAVENOUS AS NEEDED
Status: DISCONTINUED | OUTPATIENT
Start: 2021-06-21 | End: 2021-06-21

## 2021-06-21 RX ORDER — KETOROLAC TROMETHAMINE 30 MG/ML
15 INJECTION, SOLUTION INTRAMUSCULAR; INTRAVENOUS ONCE
Status: COMPLETED | OUTPATIENT
Start: 2021-06-21 | End: 2021-06-21

## 2021-06-21 RX ORDER — EPHEDRINE SULFATE 50 MG/ML
INJECTION INTRAVENOUS AS NEEDED
Status: DISCONTINUED | OUTPATIENT
Start: 2021-06-21 | End: 2021-06-21

## 2021-06-21 RX ORDER — ONDANSETRON 2 MG/ML
4 INJECTION INTRAMUSCULAR; INTRAVENOUS ONCE AS NEEDED
Status: DISCONTINUED | OUTPATIENT
Start: 2021-06-21 | End: 2021-06-21 | Stop reason: HOSPADM

## 2021-06-21 RX ORDER — BUPIVACAINE HYDROCHLORIDE 5 MG/ML
INJECTION, SOLUTION EPIDURAL; INTRACAUDAL AS NEEDED
Status: DISCONTINUED | OUTPATIENT
Start: 2021-06-21 | End: 2021-06-21 | Stop reason: HOSPADM

## 2021-06-21 RX ORDER — FUROSEMIDE 10 MG/ML
INJECTION INTRAMUSCULAR; INTRAVENOUS AS NEEDED
Status: DISCONTINUED | OUTPATIENT
Start: 2021-06-21 | End: 2021-06-21

## 2021-06-21 RX ORDER — ALBUMIN, HUMAN INJ 5% 5 %
SOLUTION INTRAVENOUS CONTINUOUS PRN
Status: DISCONTINUED | OUTPATIENT
Start: 2021-06-21 | End: 2021-06-21

## 2021-06-21 RX ORDER — CEFAZOLIN SODIUM 1 G/50ML
1000 SOLUTION INTRAVENOUS ONCE
Status: COMPLETED | OUTPATIENT
Start: 2021-06-21 | End: 2021-06-21

## 2021-06-21 RX ORDER — ONDANSETRON 2 MG/ML
4 INJECTION INTRAMUSCULAR; INTRAVENOUS EVERY 8 HOURS PRN
Status: DISCONTINUED | OUTPATIENT
Start: 2021-06-21 | End: 2021-06-25 | Stop reason: HOSPADM

## 2021-06-21 RX ORDER — SODIUM CHLORIDE, SODIUM LACTATE, POTASSIUM CHLORIDE, CALCIUM CHLORIDE 600; 310; 30; 20 MG/100ML; MG/100ML; MG/100ML; MG/100ML
150 INJECTION, SOLUTION INTRAVENOUS CONTINUOUS
Status: DISCONTINUED | OUTPATIENT
Start: 2021-06-21 | End: 2021-06-22

## 2021-06-21 RX ORDER — PROPOFOL 10 MG/ML
INJECTION, EMULSION INTRAVENOUS AS NEEDED
Status: DISCONTINUED | OUTPATIENT
Start: 2021-06-21 | End: 2021-06-21

## 2021-06-21 RX ADMIN — SODIUM CHLORIDE, SODIUM LACTATE, POTASSIUM CHLORIDE, AND CALCIUM CHLORIDE: .6; .31; .03; .02 INJECTION, SOLUTION INTRAVENOUS at 12:43

## 2021-06-21 RX ADMIN — ALBUMIN (HUMAN): 12.5 INJECTION, SOLUTION INTRAVENOUS at 13:35

## 2021-06-21 RX ADMIN — FENTANYL CITRATE 50 MCG: 50 INJECTION, SOLUTION INTRAMUSCULAR; INTRAVENOUS at 09:57

## 2021-06-21 RX ADMIN — FENTANYL CITRATE 50 MCG: 50 INJECTION, SOLUTION INTRAMUSCULAR; INTRAVENOUS at 10:20

## 2021-06-21 RX ADMIN — PHENYLEPHRINE HYDROCHLORIDE 100 MCG: 10 INJECTION INTRAVENOUS at 11:21

## 2021-06-21 RX ADMIN — PHENYLEPHRINE HYDROCHLORIDE 100 MCG: 10 INJECTION INTRAVENOUS at 12:13

## 2021-06-21 RX ADMIN — SODIUM CHLORIDE, SODIUM LACTATE, POTASSIUM CHLORIDE, AND CALCIUM CHLORIDE: .6; .31; .03; .02 INJECTION, SOLUTION INTRAVENOUS at 10:31

## 2021-06-21 RX ADMIN — PHENYLEPHRINE HYDROCHLORIDE 100 MCG: 10 INJECTION INTRAVENOUS at 11:14

## 2021-06-21 RX ADMIN — POTASSIUM CHLORIDE 20 MEQ: 14.9 INJECTION, SOLUTION INTRAVENOUS at 22:02

## 2021-06-21 RX ADMIN — NEOSTIGMINE METHYLSULFATE 4 MG: 1 INJECTION INTRAVENOUS at 13:54

## 2021-06-21 RX ADMIN — HYDROMORPHONE HYDROCHLORIDE: 10 INJECTION, SOLUTION INTRAMUSCULAR; INTRAVENOUS; SUBCUTANEOUS at 17:09

## 2021-06-21 RX ADMIN — FENTANYL CITRATE 50 MCG: 50 INJECTION, SOLUTION INTRAMUSCULAR; INTRAVENOUS at 09:25

## 2021-06-21 RX ADMIN — ALBUMIN (HUMAN) 12.5 G: 12.5 INJECTION, SOLUTION INTRAVENOUS at 17:21

## 2021-06-21 RX ADMIN — METRONIDAZOLE 500 MG: 500 INJECTION, SOLUTION INTRAVENOUS at 09:55

## 2021-06-21 RX ADMIN — ROCURONIUM BROMIDE 10 MG: 10 INJECTION, SOLUTION INTRAVENOUS at 11:00

## 2021-06-21 RX ADMIN — ONDANSETRON 4 MG: 2 INJECTION INTRAMUSCULAR; INTRAVENOUS at 13:42

## 2021-06-21 RX ADMIN — ROCURONIUM BROMIDE 10 MG: 10 INJECTION, SOLUTION INTRAVENOUS at 10:25

## 2021-06-21 RX ADMIN — PHENYLEPHRINE HYDROCHLORIDE 100 MCG: 10 INJECTION INTRAVENOUS at 12:02

## 2021-06-21 RX ADMIN — SODIUM CHLORIDE, SODIUM LACTATE, POTASSIUM CHLORIDE, AND CALCIUM CHLORIDE 100 ML/HR: .6; .31; .03; .02 INJECTION, SOLUTION INTRAVENOUS at 17:57

## 2021-06-21 RX ADMIN — SODIUM CHLORIDE, SODIUM LACTATE, POTASSIUM CHLORIDE, CALCIUM CHLORIDE: 600; 310; 30; 20 INJECTION, SOLUTION INTRAVENOUS at 09:10

## 2021-06-21 RX ADMIN — PANTOPRAZOLE SODIUM 40 MG: 40 INJECTION, POWDER, FOR SOLUTION INTRAVENOUS at 20:56

## 2021-06-21 RX ADMIN — SODIUM CHLORIDE, SODIUM LACTATE, POTASSIUM CHLORIDE, AND CALCIUM CHLORIDE 150 ML/HR: .6; .31; .03; .02 INJECTION, SOLUTION INTRAVENOUS at 23:39

## 2021-06-21 RX ADMIN — FENTANYL CITRATE 50 MCG: 50 INJECTION, SOLUTION INTRAMUSCULAR; INTRAVENOUS at 09:21

## 2021-06-21 RX ADMIN — MIDAZOLAM 1 MG: 1 INJECTION INTRAMUSCULAR; INTRAVENOUS at 09:21

## 2021-06-21 RX ADMIN — SODIUM CHLORIDE, SODIUM LACTATE, POTASSIUM CHLORIDE, AND CALCIUM CHLORIDE 50 ML/HR: .6; .31; .03; .02 INJECTION, SOLUTION INTRAVENOUS at 08:14

## 2021-06-21 RX ADMIN — SODIUM CHLORIDE, SODIUM LACTATE, POTASSIUM CHLORIDE, AND CALCIUM CHLORIDE: .6; .31; .03; .02 INJECTION, SOLUTION INTRAVENOUS at 13:54

## 2021-06-21 RX ADMIN — PROPOFOL 100 MG: 10 INJECTION, EMULSION INTRAVENOUS at 09:21

## 2021-06-21 RX ADMIN — DEXAMETHASONE SODIUM PHOSPHATE 4 MG: 10 INJECTION, SOLUTION INTRAMUSCULAR; INTRAVENOUS at 09:25

## 2021-06-21 RX ADMIN — FUROSEMIDE 5 MG: 10 INJECTION, SOLUTION INTRAVENOUS at 13:30

## 2021-06-21 RX ADMIN — SODIUM CHLORIDE 1000 ML: 0.9 INJECTION, SOLUTION INTRAVENOUS at 16:00

## 2021-06-21 RX ADMIN — LABETALOL 20 MG/4 ML (5 MG/ML) INTRAVENOUS SYRINGE 10 MG: at 10:23

## 2021-06-21 RX ADMIN — CEFAZOLIN SODIUM 1000 MG: 1 SOLUTION INTRAVENOUS at 09:15

## 2021-06-21 RX ADMIN — HYDROMORPHONE HYDROCHLORIDE 0.5 MG: 1 INJECTION, SOLUTION INTRAMUSCULAR; INTRAVENOUS; SUBCUTANEOUS at 14:24

## 2021-06-21 RX ADMIN — CEFAZOLIN SODIUM 1000 MG: 1 SOLUTION INTRAVENOUS at 13:12

## 2021-06-21 RX ADMIN — EPHEDRINE SULFATE 10 MG: 50 INJECTION, SOLUTION INTRAVENOUS at 12:51

## 2021-06-21 RX ADMIN — ROCURONIUM BROMIDE 40 MG: 10 INJECTION, SOLUTION INTRAVENOUS at 09:22

## 2021-06-21 RX ADMIN — CEFAZOLIN SODIUM 2000 MG: 2 SOLUTION INTRAVENOUS at 22:35

## 2021-06-21 RX ADMIN — EPHEDRINE SULFATE 10 MG: 50 INJECTION, SOLUTION INTRAVENOUS at 13:32

## 2021-06-21 RX ADMIN — PHENYLEPHRINE HYDROCHLORIDE 100 MCG: 10 INJECTION INTRAVENOUS at 12:44

## 2021-06-21 RX ADMIN — MIDAZOLAM 1 MG: 1 INJECTION INTRAMUSCULAR; INTRAVENOUS at 09:12

## 2021-06-21 RX ADMIN — EPHEDRINE SULFATE 10 MG: 50 INJECTION, SOLUTION INTRAVENOUS at 11:27

## 2021-06-21 RX ADMIN — LIDOCAINE HYDROCHLORIDE 50 MG: 10 INJECTION, SOLUTION EPIDURAL; INFILTRATION; INTRACAUDAL; PERINEURAL at 09:21

## 2021-06-21 RX ADMIN — PHENYLEPHRINE HYDROCHLORIDE 100 MCG: 10 INJECTION INTRAVENOUS at 12:54

## 2021-06-21 RX ADMIN — PHENYLEPHRINE HYDROCHLORIDE 100 MCG: 10 INJECTION INTRAVENOUS at 12:23

## 2021-06-21 RX ADMIN — HEPARIN SODIUM 5000 UNITS: 5000 INJECTION INTRAVENOUS; SUBCUTANEOUS at 21:28

## 2021-06-21 RX ADMIN — HYDROMORPHONE HYDROCHLORIDE 0.5 MG: 1 INJECTION, SOLUTION INTRAMUSCULAR; INTRAVENOUS; SUBCUTANEOUS at 14:46

## 2021-06-21 RX ADMIN — SODIUM CHLORIDE, SODIUM LACTATE, POTASSIUM CHLORIDE, AND CALCIUM CHLORIDE: .6; .31; .03; .02 INJECTION, SOLUTION INTRAVENOUS at 08:14

## 2021-06-21 RX ADMIN — KETOROLAC TROMETHAMINE 15 MG: 30 INJECTION, SOLUTION INTRAMUSCULAR; INTRAVENOUS at 14:58

## 2021-06-21 RX ADMIN — METRONIDAZOLE 500 MG: 500 SOLUTION INTRAVENOUS at 21:03

## 2021-06-21 RX ADMIN — GLYCOPYRROLATE 0.4 MG: 0.2 INJECTION, SOLUTION INTRAMUSCULAR; INTRAVENOUS at 13:54

## 2021-06-21 NOTE — PLAN OF CARE
Problem: MOBILITY - ADULT  Goal: Maintain or return to baseline ADL function  Description: INTERVENTIONS:  -  Assess patient's ability to carry out ADLs; assess patient's baseline for ADL function and identify physical deficits which impact ability to perform ADLs (bathing, care of mouth/teeth, toileting, grooming, dressing, etc )  - Assess/evaluate cause of self-care deficits   - Assess range of motion  - Assess patient's mobility; develop plan if impaired  - Assess patient's need for assistive devices and provide as appropriate  - Encourage maximum independence but intervene and supervise when necessary  - Involve family in performance of ADLs  - Assess for home care needs following discharge   - Consider OT consult to assist with ADL evaluation and planning for discharge  - Provide patient education as appropriate  Outcome: Progressing  Goal: Maintains/Returns to pre admission functional level  Description: INTERVENTIONS:  - Perform BMAT or MOVE assessment daily    - Set and communicate daily mobility goal to care team and patient/family/caregiver  - Collaborate with rehabilitation services on mobility goals if consulted  - Perform Range of Motion 4 times a day  - Reposition patient every 2 hours    - Dangle patient 4 times a day  - Stand patient 4 times a day  - Ambulate patient 4 times a day  - Out of bed to chair 4 times a day   - Out of bed for meals 4 times a day  - Out of bed for toileting  - Record patient progress and toleration of activity level   Outcome: Progressing     Problem: PAIN - ADULT  Goal: Verbalizes/displays adequate comfort level or baseline comfort level  Description: Interventions:  - Encourage patient to monitor pain and request assistance  - Assess pain using appropriate pain scale  - Administer analgesics based on type and severity of pain and evaluate response  - Implement non-pharmacological measures as appropriate and evaluate response  - Consider cultural and social influences on pain and pain management  - Notify physician/advanced practitioner if interventions unsuccessful or patient reports new pain  Outcome: Progressing     Problem: INFECTION - ADULT  Goal: Absence or prevention of progression during hospitalization  Description: INTERVENTIONS:  - Assess and monitor for signs and symptoms of infection  - Monitor lab/diagnostic results  - Monitor all insertion sites, i e  indwelling lines, tubes, and drains  - Monitor endotracheal if appropriate and nasal secretions for changes in amount and color  - [place] appropriate cooling/warming therapies per order  - Administer medications as ordered  - Instruct and encourage patient and family to use good hand hygiene technique  - Identify and instruct in appropriate isolation precautions for identified infection/condition  Outcome: Progressing  Goal: Absence of fever/infection during neutropenic period  Description: INTERVENTIONS:  - Monitor WBC    Outcome: Progressing     Problem: SAFETY ADULT  Goal: Maintain or return to baseline ADL function  Description: INTERVENTIONS:  -  Assess patient's ability to carry out ADLs; assess patient's baseline for ADL function and identify physical deficits which impact ability to perform ADLs (bathing, care of mouth/teeth, toileting, grooming, dressing, etc )  - Assess/evaluate cause of self-care deficits   - Assess range of motion  - Assess patient's mobility; develop plan if impaired  - Assess patient's need for assistive devices and provide as appropriate  - Encourage maximum independence but intervene and supervise when necessary  - Involve family in performance of ADLs  - Assess for home care needs following discharge   - Consider OT consult to assist with ADL evaluation and planning for discharge  - Provide patient education as appropriate  Outcome: Progressing  Goal: Maintains/Returns to pre admission functional level  Description: INTERVENTIONS:  - Perform BMAT or MOVE assessment daily    - Set and communicate daily mobility goal to care team and patient/family/caregiver  - Collaborate with rehabilitation services on mobility goals if consulted  - Perform Range of Motion 4 times a day  - Reposition patient every 2 hours    - Dangle patient 4 times a day  - Stand patient 4 times a day  - Ambulate patient 4 times a day  - Out of bed to chair 4 times a day   - Out of bed for meals 4 times a day  - Out of bed for toileting  - Record patient progress and toleration of activity level   Outcome: Progressing  Goal: Patient will remain free of falls  Description: INTERVENTIONS:  - Educate patient/family on patient safety including physical limitations  - Instruct patient to call for assistance with activity   - Consult OT/PT to assist with strengthening/mobility   - Keep Call bell within reach  - Keep bed low and locked with side rails adjusted as appropriate  - Keep care items and personal belongings within reach  - Initiate and maintain comfort rounds  - Make Fall Risk Sign visible to staff  - Obtain necessary fall risk management equipment   - Apply yellow socks and bracelet for high fall risk patients  - Consider moving patient to room near nurses station  Outcome: Progressing     Problem: DISCHARGE PLANNING  Goal: Discharge to home or other facility with appropriate resources  Description: INTERVENTIONS:  - Identify barriers to discharge w/patient and caregiver  - Arrange for needed discharge resources and transportation as appropriate  - Identify discharge learning needs (meds, wound care, etc )  - Arrange for interpretive services to assist at discharge as needed  - Refer to Case Management Department for coordinating discharge planning if the patient needs post-hospital services based on physician/advanced practitioner order or complex needs related to functional status, cognitive ability, or social support system  Outcome: Progressing     Problem: Knowledge Deficit  Goal: Patient/family/caregiver demonstrates understanding of disease process, treatment plan, medications, and discharge instructions  Description: Complete learning assessment and assess knowledge base    Interventions:  - Provide teaching at level of understanding  - Provide teaching via preferred learning methods  Outcome: Progressing

## 2021-06-21 NOTE — OP NOTE
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    Procedure(s) (LRB):  RESECTION COLON SIGMOID LAPAROSCOPIC (attempted), open resection sigmoid colon, left salpingectomy (N/A) mobilization of splenic flexure, rigid procto    Specimen(s):  ID Type Source Tests Collected by Time Destination   1 : sigmoid colon and donuts Tissue Large Intestine, Sigmoid Colon TISSUE EXAM Nancy Berrios MD 2021 12:37 PM    2 :  Tissue Fallopian Tube, Left TISSUE EXAM Nancy Berrios MD 2021  1:15 PM        Estimated Blood Loss:   Minimal    Drains:  Urethral Catheter Latex 16 Fr  (Active)   Number of days: 0       Anesthesia Type:   General    Operative Indications:  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]  Perforated diverticulitis    Operative Findings:  Colocutaneous fistula from sigmoid colon diverticulitis  Left tube and ovary densely adherent to the sigmoid colon and the fistula  Complications:   None    Procedure and Technique:  Patient brought the operating room postop table in a supine dorsal lithotomy position  Under adequate general and endotracheal anesthesia the patient was placed in stirrups and prepped and draped in usual sterile fashion  Nicholson catheter had been inserted       A small incision made the umbilicus the Veress needle inserted  The abdomen was insufflated with CO2 to 15 mmHg  Needle was removed 5 mm port is inserted  5 mm scope was inserted the abdomen was visually explored    There was noted be no trauma from needle port placement  The area the colocutaneous fistula in the left lower quadrant was inspected  A portion of colon appeared densely adherent to the anterior abdominal wall consistent with the fistula  There did appear to be the left tube densely adhered to the sigmoid diverticulitis  The ovary was not visualized but was in the very general vicinity  Additional ports were placed in the right upper quadrant right lower quadrant  5 mm ports placed under direct vision  The 5 mm port at the umbilicus changed to a 10  10 mm 45 degree scope was used at that time  Attention was placed to the colocutaneous fistula  It was densely adherent to the anterior abdominal wall it was attempted to be mobilized using the Harmonic scalpel and blunt dissection  The left tube was involved in the entire phlegmon  The colon was dissected off the anterior abdominal wall in the area of the fistula  Moving from superior to inferior and back and forth it was once again attempted to be dissected free  Oozing was controlled with the Harmonic scalpel  Patient was placed head down and rotated to the right  The dissection carried superiorly and inferiorly in the left gutter attempting to mobilize the colon medially  This was quite slow and tedious and also difficult due to the severe fibrotic reaction  Eventually some progress was made  The distal end of the colon appeared somewhat fixed in the pelvis and there was not a lot of mobility in the distal end  Attention was placed to these the white line of Toldt superior to the disease  At that point it was felt that mobilizing the splenic flexure laparoscopically would ultimately aided in the dissection and anastomosis  This was done up to the spleen and the splenic flexure was dissected from the spleen itself  It was felt at that time if the splenic flexure could be mobilized laparoscopically and the need to open arose then the incision could be smaller    Mobilization of the phlegmon proceeded but the tube and ovary were impossible to dissect off of it at that point  The possibility of taking the tube and ovary was considered  At that point it appeared that we had had gone as far as we could laparoscopically and we chose open the patient  A lower midline incision was initially performed using a 10  Scalpel blade  The Bovie was used to further open the abdomen  The incision was carried through the umbilicus through the previous 10 mm port site  A significant amount of adhesions were present from the surrounding tissue to the phlegmon  Her many of these were taken laparoscopically an additional ones need to be taken at this time to mobilize the disease  Eventually the tube and ovary were dissected off of the diseased portion of the colon allowing increased mobility of it  The area distal to the disease in the pelvis at the rectosigmoid or rectum was palpated and appeared to be diseased free  At that point some of the peritoneum over the mesorectum was cut with the Bovie to allow more mobilization of distal bowel  At that point small opening was made between the bowel itself in the mesentery  A contour stapling device was obtained and passed through this defect  It was closed and fired essentially dividing the distal end of the bowel at the area of the proximal rectum  The mesentery was taken using the Harmonic scalpel up to and past the disease process  Unfortunately the proximal descending colon was not as mobilized as previously thought  It was adherent to the area of the omentum and also the gastrocolic ligament  The incision had to be extended proximally somewhat using the scalpel and the Bovie to allow access to the remainder of the splenic flexure  Methodically the adhesions were taken down using the Bovie and also the Harmonic scalpel  Eventually the splenic flexure was indeed mobilized down to the proximal bowel could reach the distal bowel    A point of resection in the proximal descending colon was then chosen  A small colotomy was made using the Bovie  A number 34 EEA was obtained  The anvil was passed through the colotomy  The contour was used again to divide the bowel in this area with the anvil inside the proximal descending colon  The specimen was then sent to pathology  A small colotomy was made over the anvil of the EEA and was pushed through  At that point attention was placed to the perineum  The drapes were cut to allow access to the anus  It was dilated 1st with the gloved finger and then with sequential passage of the sizers  The 29 appeared to be ideal   A 29 EEA was then passed  It was placed in optimal position and then opened the spike piercing the distal bowel  The proximal bowel was then attached to this  It was then closed and fired forming an end-to-end anastomosis  It was removed without difficulty  The donuts were inspected and appeared to be intact and 60°  The pelvis was then instilled with saline solution  A rigid procto was performed as the bowel above the anastomosis was pinched with the gloved finger  This distended the bowel there was no evidence of bubble or leak  Any residual fluid was suction from the field  Hemostasis was obtained at that time  The tube itself  Be persistently bleeding and at that point a left salpingectomy was performed  The base of this was clamped with a Nora clamp  Suture ligature of 0 Vicryl was used to suture ligate the pedicle  The tube was then amputated using the scalpel sent to pathology for histological diagnosis  The areas once consent inspected for bleeding  The bleeding was noted  The areas irrigated with saline solution was suction from the field  The anastomosis was checked 1 last time it appeared to be intact and viable  The small bowel was placed in his the anatomical position  Any residual fluid was suction from the field  At that point the abdomen was closed    1  Vicryl sutures were placed as internal retention sutures  Two 1  PDS is were run together and ligated the middle  Laparoscopic incision was closed with 4 Monocryl in a subcuticular fashion  Skin closed with 3-0 nylon in a vertical mattress fashion  At that point and the colocutaneous fistula was cored out with a hemostat and then cauterized with the Bovie  The tip of a 4 x 4 was placed in this  Dry sterile dressings were applied  The estimated blood loss was 100 cc  Patient tolerated the procedure well she was delivered to recovery room stable condition  Thanks       I was present for the entire procedure    Patient Disposition:  PACU     SIGNATURE: Hang Garcia MD  DATE: June 21, 2021  TIME: 3:09 PM

## 2021-06-21 NOTE — ANESTHESIA POSTPROCEDURE EVALUATION
Post-Op Assessment Note    CV Status:  Stable  Pain Score: 2    Pain management: adequate     Mental Status:  Alert and awake   Hydration Status:  Stable   PONV Controlled:  None   Airway Patency:  Patent      Post Op Vitals Reviewed: Yes      Staff: CRNA         No complications documented      BP      Temp     Pulse     Resp      SpO2

## 2021-06-21 NOTE — PERIOPERATIVE NURSING NOTE
Patient is presently sleeping comfortable  Patient sbp still 70's -80's  The patient is asystematic with it  Dr Eugene Suazo was at patient's bedside in PACU numerous times and aware of sbp  Dr Eugene Suazo also placed a couple of orders for b/p  Dr Eugene Suazo stated it was okay to send patient to floor with sbp 70's &80's

## 2021-06-21 NOTE — NURSING NOTE
Dr Richard Cook notified of low BP and potassium of 2 9  Potassium replacement IV ordered  Will continue to monitor BP frequently  Pt asymptomatic at this time  NG to low continuous suction  Yellow drainage noted  Abdominal dressing is clean dry and intact  Pt educated about PCA pump

## 2021-06-21 NOTE — H&P
Pili Schneider MD (Physician) [facility]     Chief Complaint:  Complicated diverticulitis with colocutaneous fistula        History of Present Illness:  Patient is a 64-year-old white female with history of complicated sigmoid diverticulitis   Apparently in September of 2020 she began to have left lower quadrant abdominal pain   She presented to the emergency room at [facility] where CT scan demonstrated diverticulitis with a possible fistula toward the inguinal canal   She was treated medically and ultimately discharged   A few days later she return to the emergency room with recurrence and increasing left lower quadrant pain   Repeat CT scan demonstrated an abscess   The patient underwent CT-guided abscess drainage with placement of a drain   She was treated with IV antibiotics switched to oral and discharged   She came back for re-evaluation and repeat CT scan   Initially there was improvement of the abscess but unfortunately it eventually recurred   Repeat IR drainage with a different catheter was performed   Patient underwent abscessogram x2 the last 1 showing almost complete resolution of left lower quadrant diverticular abscess    She eventually underwent drain removal or the drain fell out   Since then she has had persistent drainage from the drain site   She notified her physicians and they said just to keep it covered with a Band-Aid and that it would eventually heal  Unfortunately it did not        She was referred office  by her family physician for consideration of the situation      The patient states her bowels are moving   She denies fever chills or other subjective symptoms of sepsis        She was initially treated with some oral antibiotics to see if the fistula  would spontaneously close  Unfortunately it did not    Subsequent she was scheduled for colonoscopy but colonoscopy was unsuccessful due to a stricture in the sigmoid colon      She was originally again surgery but unfortunately that is only recourse at this time and she is here today to discuss scheduling surgery      Past Medical History:   Medical History   History reviewed  No pertinent past medical history             Past Surgical History:    Surgical History   History reviewed  No pertinent surgical history             Allergies:  No Known Allergies        Medications:    Current Outpatient Medications:     ibuprofen (MOTRIN) 200 mg tablet, Take 200 mg by mouth every 6 (six) hours as needed, Disp: , Rfl:     sertraline (ZOLOFT) 50 mg tablet, Take 100 mg by mouth daily, Disp: , Rfl:     amoxicillin-clavulanate (AUGMENTIN) 875-125 mg per tablet, Take 1 tablet by mouth every 12 (twelve) hours for 14 days, Disp: 28 tablet, Rfl: 0        Social History:    Social History             Social History              Substance and Sexual Activity   Alcohol Use Yes    Alcohol/week: 2 0 standard drinks    Types: 1 Shots of liquor, 1 Cans of beer per week    Frequency: 4 or more times a week    Drinks per session: 5 or 6    Binge frequency: Daily or almost daily      Social History            Substance and Sexual Activity   Drug Use Never      Social History              Tobacco Use   Smoking Status Current Some Day Smoker    Packs/day: 1 00    Types: Cigarettes   Smokeless Tobacco Never Used            Family History:  History reviewed   No pertinent family history         Review of Systems:    As per the HPI  Etha Mooring from drain site   No fever chills night sweats   No weight loss weight gain chest pain nausea vomiting diarrhea constipation shortness of breath dysuria hematuria headaches sore throat blurry vision double vision chronic cough dysuria hematuria etcetera   All other review of systems are negative         Vitals:        Vitals:     01/06/21 1038   BP: 118/62   Pulse: 94   Temp: 99 8 °F (37 7 °C)         Physical Exam:  The patient is diminutive slim white female 5 ft 3 in and 106 lb   She is awake alert in no distress  Vital signs as above  Skin warm dry  Head normocephalic and atraumatic  Eyes ARNEL a m  Intact  Ears nose within normal limits  Throat gag reflex intact  Neck no masses thyromegaly lymphadenopathy palpable  Back no CVA or spinal tenderness  Lungs clear to a and P  Cor regular rate and rhythm no murmurs carotid bruits  Abdomen flat firm   There is a fullness in the left lower quadrant that is somewhat tender to palpation  Madonna Deed is a small opening in the skin with a small amount of watery brownish drainage which is consistent with the former percutaneous drain site   No other tenderness is noted no masses present  Extremities arthritic changes are noted negative CC E  Neurologically A&O x3 cranial nerves 2-12 intact  Lymphatics no lymphadenopathy palpable         Lab Results: I have personally reviewed pertinent reports  See below  Imaging: I have personally reviewed pertinent reports  EKG, Pathology, and Other Studies: I have personally reviewed pertinent reports       No visits with results within 1 Day(s) from this visit  Latest known visit with results is:   No results found for any previous visit             Impression:  Complicated sigmoid diverticulitis with colocutaneous fistula      The concept of diverticulitis is explained to the patient along with diverticular abscess and colocutaneous fistula   Drawings are made to further explain this and she understands       The management of fistulas are discussed and what needs to be done to facilitate their closure   Usually fistulas were given 6-8 weeks to close spontaneously   After that something more aggressive (surgery) needs to be seriously considered      She understands and is actually quite grateful for the explanations     Plan: At this point her only recourse is surgery  We will attempt to prep her bowel    We will attempt laparoscopic sigmoid colon resection at the earliest possible date

## 2021-06-22 LAB
ANION GAP SERPL CALCULATED.3IONS-SCNC: 4 MMOL/L (ref 5–14)
ANION GAP SERPL CALCULATED.3IONS-SCNC: 5 MMOL/L (ref 5–14)
ANISOCYTOSIS BLD QL SMEAR: PRESENT
APTT 1H NP PPP: 43 SECONDS (ref 24–36)
APTT IMM NP PPP: 37.9 SECONDS (ref 24–36)
APTT PPP: 54 SECONDS (ref 23–37)
APTT PPP: 64 SECONDS (ref 23–37)
BASOPHILS # BLD AUTO: 0 THOUSANDS/ΜL (ref 0–0.1)
BASOPHILS NFR BLD AUTO: 0 % (ref 0–1)
BUN SERPL-MCNC: 15 MG/DL (ref 5–25)
BUN SERPL-MCNC: 9 MG/DL (ref 5–25)
CALCIUM SERPL-MCNC: 7 MG/DL (ref 8.4–10.2)
CALCIUM SERPL-MCNC: 8.5 MG/DL (ref 8.4–10.2)
CHLORIDE SERPL-SCNC: 106 MMOL/L (ref 97–108)
CHLORIDE SERPL-SCNC: 108 MMOL/L (ref 97–108)
CO2 SERPL-SCNC: 18 MMOL/L (ref 22–30)
CO2 SERPL-SCNC: 24 MMOL/L (ref 22–30)
CREAT SERPL-MCNC: 0.45 MG/DL (ref 0.6–1.2)
CREAT SERPL-MCNC: 0.73 MG/DL (ref 0.6–1.2)
EOSINOPHIL # BLD AUTO: 0 THOUSAND/ΜL (ref 0–0.4)
EOSINOPHIL NFR BLD AUTO: 0 % (ref 0–6)
ERYTHROCYTE [DISTWIDTH] IN BLOOD BY AUTOMATED COUNT: 15.5 %
ERYTHROCYTE [DISTWIDTH] IN BLOOD BY AUTOMATED COUNT: 19 %
FIBRINOGEN PPP-MCNC: 438 MG/DL (ref 227–495)
GFR SERPL CREATININE-BSD FRML MDRD: 109 ML/MIN/1.73SQ M
GFR SERPL CREATININE-BSD FRML MDRD: 90 ML/MIN/1.73SQ M
GLUCOSE SERPL-MCNC: 64 MG/DL (ref 70–99)
GLUCOSE SERPL-MCNC: 93 MG/DL (ref 70–99)
HCT VFR BLD AUTO: 15.9 % (ref 36–46)
HCT VFR BLD AUTO: 28.8 % (ref 36–46)
HGB BLD-MCNC: 10.2 G/DL (ref 12–16)
HGB BLD-MCNC: 5.6 G/DL (ref 12–16)
INR PPP: 1.75 (ref 0.84–1.19)
LYMPHOCYTES # BLD AUTO: 0.25 THOUSAND/UL (ref 0.5–4)
LYMPHOCYTES # BLD AUTO: 0.4 THOUSANDS/ΜL (ref 0.5–4)
LYMPHOCYTES # BLD AUTO: 3 % (ref 25–45)
LYMPHOCYTES NFR BLD AUTO: 9 % (ref 25–45)
MACROCYTES BLD QL AUTO: PRESENT
MACROCYTES BLD QL AUTO: PRESENT
MCH RBC QN AUTO: 36.4 PG (ref 26–34)
MCH RBC QN AUTO: 38.9 PG (ref 26–34)
MCHC RBC AUTO-ENTMCNC: 35 G/DL (ref 31–36)
MCHC RBC AUTO-ENTMCNC: 35.3 G/DL (ref 31–36)
MCV RBC AUTO: 103 FL (ref 80–100)
MCV RBC AUTO: 111 FL (ref 80–100)
MONOCYTES # BLD AUTO: 0.08 THOUSAND/UL (ref 0.2–0.9)
MONOCYTES # BLD AUTO: 0.2 THOUSAND/ΜL (ref 0.2–0.9)
MONOCYTES NFR BLD AUTO: 1 % (ref 1–10)
MONOCYTES NFR BLD AUTO: 4 % (ref 1–10)
NEUTROPHILS # BLD AUTO: 3.8 THOUSANDS/ΜL (ref 1.8–7.8)
NEUTS BAND NFR BLD MANUAL: 7 % (ref 0–8)
NEUTS SEG # BLD: 7.87 THOUSAND/UL (ref 1.8–7.8)
NEUTS SEG NFR BLD AUTO: 87 % (ref 45–65)
NEUTS SEG NFR BLD AUTO: 89 %
PLATELET # BLD AUTO: 142 THOUSANDS/UL (ref 150–450)
PLATELET # BLD AUTO: 176 THOUSANDS/UL (ref 150–450)
PLATELET BLD QL SMEAR: ABNORMAL
PLATELET BLD QL SMEAR: ADEQUATE
PMV BLD AUTO: 6.8 FL (ref 8.9–12.7)
PMV BLD AUTO: 7 FL (ref 8.9–12.7)
POLYCHROMASIA BLD QL SMEAR: PRESENT
POTASSIUM SERPL-SCNC: 3.5 MMOL/L (ref 3.6–5)
POTASSIUM SERPL-SCNC: 3.8 MMOL/L (ref 3.6–5)
PROTHROMBIN TIME: 19.7 SECONDS (ref 11.6–14.5)
PROTHROMBIN TIME: 20.5 SECONDS (ref 11.6–14.5)
PT 1H NP PPP: 15.1 SEC (ref 12–14.3)
PT IMM NP PPP: 14.5 SECONDS (ref 12–14.3)
RBC # BLD AUTO: 1.43 MILLION/UL (ref 4–5.2)
RBC # BLD AUTO: 2.79 MILLION/UL (ref 4–5.2)
RBC MORPH BLD: ABNORMAL
RBC MORPH BLD: PRESENT
SODIUM SERPL-SCNC: 131 MMOL/L (ref 137–147)
SODIUM SERPL-SCNC: 134 MMOL/L (ref 137–147)
THROMBIN TIME: 16 SECONDS (ref 14.7–18.4)
THROMBIN TIME: 16.8 SECONDS (ref 14.7–18.4)
TOTAL CELLS COUNTED SPEC: 100
WBC # BLD AUTO: 4.4 THOUSAND/UL (ref 4.5–11)
WBC # BLD AUTO: 8.2 THOUSAND/UL (ref 4.5–11)

## 2021-06-22 PROCEDURE — 85611 PROTHROMBIN TEST: CPT | Performed by: SPECIALIST

## 2021-06-22 PROCEDURE — C9113 INJ PANTOPRAZOLE SODIUM, VIA: HCPCS | Performed by: SPECIALIST

## 2021-06-22 PROCEDURE — 85384 FIBRINOGEN ACTIVITY: CPT | Performed by: SPECIALIST

## 2021-06-22 PROCEDURE — 80048 BASIC METABOLIC PNL TOTAL CA: CPT | Performed by: SPECIALIST

## 2021-06-22 PROCEDURE — P9017 PLASMA 1 DONOR FRZ W/IN 8 HR: HCPCS

## 2021-06-22 PROCEDURE — 85732 THROMBOPLASTIN TIME PARTIAL: CPT | Performed by: SPECIALIST

## 2021-06-22 PROCEDURE — 30233N1 TRANSFUSION OF NONAUTOLOGOUS RED BLOOD CELLS INTO PERIPHERAL VEIN, PERCUTANEOUS APPROACH: ICD-10-PCS | Performed by: SPECIALIST

## 2021-06-22 PROCEDURE — 99024 POSTOP FOLLOW-UP VISIT: CPT | Performed by: SPECIALIST

## 2021-06-22 PROCEDURE — 85730 THROMBOPLASTIN TIME PARTIAL: CPT | Performed by: SPECIALIST

## 2021-06-22 PROCEDURE — 85027 COMPLETE CBC AUTOMATED: CPT | Performed by: SPECIALIST

## 2021-06-22 PROCEDURE — 85007 BL SMEAR W/DIFF WBC COUNT: CPT | Performed by: SPECIALIST

## 2021-06-22 PROCEDURE — 85670 THROMBIN TIME PLASMA: CPT | Performed by: SPECIALIST

## 2021-06-22 PROCEDURE — 85610 PROTHROMBIN TIME: CPT | Performed by: SPECIALIST

## 2021-06-22 PROCEDURE — 85025 COMPLETE CBC W/AUTO DIFF WBC: CPT | Performed by: SPECIALIST

## 2021-06-22 PROCEDURE — P9016 RBC LEUKOCYTES REDUCED: HCPCS

## 2021-06-22 RX ORDER — FUROSEMIDE 10 MG/ML
20 INJECTION INTRAMUSCULAR; INTRAVENOUS ONCE
Status: COMPLETED | OUTPATIENT
Start: 2021-06-22 | End: 2021-06-22

## 2021-06-22 RX ORDER — POTASSIUM CHLORIDE 14.9 MG/ML
20 INJECTION INTRAVENOUS ONCE
Status: COMPLETED | OUTPATIENT
Start: 2021-06-22 | End: 2021-06-22

## 2021-06-22 RX ORDER — CALCIUM GLUCONATE 20 MG/ML
1 INJECTION, SOLUTION INTRAVENOUS ONCE
Status: COMPLETED | OUTPATIENT
Start: 2021-06-22 | End: 2021-06-22

## 2021-06-22 RX ORDER — DEXTROSE, SODIUM CHLORIDE, AND POTASSIUM CHLORIDE 5; .45; .15 G/100ML; G/100ML; G/100ML
75 INJECTION INTRAVENOUS CONTINUOUS
Status: DISCONTINUED | OUTPATIENT
Start: 2021-06-22 | End: 2021-06-23

## 2021-06-22 RX ORDER — POTASSIUM CHLORIDE 14.9 MG/ML
20 INJECTION INTRAVENOUS ONCE
Status: COMPLETED | OUTPATIENT
Start: 2021-06-22 | End: 2021-06-23

## 2021-06-22 RX ADMIN — HEPARIN SODIUM 5000 UNITS: 5000 INJECTION INTRAVENOUS; SUBCUTANEOUS at 05:07

## 2021-06-22 RX ADMIN — SODIUM CHLORIDE, SODIUM LACTATE, POTASSIUM CHLORIDE, AND CALCIUM CHLORIDE 150 ML/HR: .6; .31; .03; .02 INJECTION, SOLUTION INTRAVENOUS at 08:42

## 2021-06-22 RX ADMIN — CALCIUM GLUCONATE 1 G: 20 INJECTION, SOLUTION INTRAVENOUS at 11:21

## 2021-06-22 RX ADMIN — CALCIUM GLUCONATE 1 G: 20 INJECTION, SOLUTION INTRAVENOUS at 12:06

## 2021-06-22 RX ADMIN — POTASSIUM CHLORIDE 20 MEQ: 14.9 INJECTION, SOLUTION INTRAVENOUS at 00:38

## 2021-06-22 RX ADMIN — CEFAZOLIN SODIUM 2000 MG: 2 SOLUTION INTRAVENOUS at 13:23

## 2021-06-22 RX ADMIN — NICOTINE 14 MG: 14 PATCH, EXTENDED RELEASE TRANSDERMAL at 08:47

## 2021-06-22 RX ADMIN — METRONIDAZOLE 500 MG: 500 SOLUTION INTRAVENOUS at 03:50

## 2021-06-22 RX ADMIN — FUROSEMIDE 20 MG: 10 INJECTION, SOLUTION INTRAVENOUS at 18:22

## 2021-06-22 RX ADMIN — PANTOPRAZOLE SODIUM 40 MG: 40 INJECTION, POWDER, FOR SOLUTION INTRAVENOUS at 08:46

## 2021-06-22 RX ADMIN — HEPARIN SODIUM 5000 UNITS: 5000 INJECTION INTRAVENOUS; SUBCUTANEOUS at 14:07

## 2021-06-22 RX ADMIN — CEFAZOLIN SODIUM 2000 MG: 2 SOLUTION INTRAVENOUS at 05:07

## 2021-06-22 RX ADMIN — DEXTROSE, SODIUM CHLORIDE, AND POTASSIUM CHLORIDE 75 ML/HR: 5; .45; .15 INJECTION INTRAVENOUS at 10:07

## 2021-06-22 RX ADMIN — POTASSIUM CHLORIDE 20 MEQ: 14.9 INJECTION, SOLUTION INTRAVENOUS at 20:24

## 2021-06-22 RX ADMIN — METRONIDAZOLE 500 MG: 500 SOLUTION INTRAVENOUS at 13:06

## 2021-06-22 RX ADMIN — POTASSIUM CHLORIDE 20 MEQ: 14.9 INJECTION, SOLUTION INTRAVENOUS at 17:42

## 2021-06-22 RX ADMIN — POTASSIUM CHLORIDE 20 MEQ: 14.9 INJECTION, SOLUTION INTRAVENOUS at 02:53

## 2021-06-22 NOTE — NURSING NOTE
Patient is awake, alert, and oriented to person, place, and time  Denies any pain or shortness of breath  Remains on PCA dilaudid and oxygen  NG tube discontinued and patient tolerated well  Vital signs are consistently low  Changed O2 monitor  Dr Stephens aware  Will continue to monitor

## 2021-06-22 NOTE — MALNUTRITION/BMI
This medical record reflects one or more clinical indicators suggestive of malnutrition     Malnutrition Findings:   Adult Malnutrition type: Unknown (comment)  Adult Degree of Malnutrition: Malnutrition of mild degree  Malnutrition Characteristics: Fat loss, Muscle loss     Pt presents with mild malnutrition as evidenced by mild fat/muscle wasting, BMI 17 54; to be treated with diet advancement as medically able, currently NPO    BMI Findings:  Adult BMI Classifications: Underweight < 18 5     Body mass index is 17 54 kg/m²  See Nutrition note dated 06/22/21 for additional details  Completed nutrition assessment is viewable in the nutrition documentation

## 2021-06-22 NOTE — NURSING NOTE
Patient is awake, alert, and oriented to person, place, and time  Denies any pain or shortness of breath  Continues with low oxygen saturation  Nail polish removed and monitor reapplied  Vital signs are consistent with low blood pressure and oxygen levels  No SOB noted  Dr Rivas Del aware  No change from initial assessment  Call bell in reach  Will continue to monitor

## 2021-06-22 NOTE — PLAN OF CARE
Problem: MOBILITY - ADULT  Goal: Maintain or return to baseline ADL function  Description: INTERVENTIONS:  -  Assess patient's ability to carry out ADLs; assess patient's baseline for ADL function and identify physical deficits which impact ability to perform ADLs (bathing, care of mouth/teeth, toileting, grooming, dressing, etc )  - Assess/evaluate cause of self-care deficits   - Assess range of motion  - Assess patient's mobility; develop plan if impaired  - Assess patient's need for assistive devices and provide as appropriate  - Encourage maximum independence but intervene and supervise when necessary  - Involve family in performance of ADLs  - Assess for home care needs following discharge   - Consider OT consult to assist with ADL evaluation and planning for discharge  - Provide patient education as appropriate  Outcome: Progressing  Goal: Maintains/Returns to pre admission functional level  Description: INTERVENTIONS:  - Perform BMAT or MOVE assessment daily    - Set and communicate daily mobility goal to care team and patient/family/caregiver     - Collaborate with rehabilitation services on mobility goals if consulted  - Record patient progress and toleration of activity level   Outcome: Progressing     Problem: PAIN - ADULT  Goal: Verbalizes/displays adequate comfort level or baseline comfort level  Description: Interventions:  - Encourage patient to monitor pain and request assistance  - Assess pain using appropriate pain scale  - Administer analgesics based on type and severity of pain and evaluate response  - Implement non-pharmacological measures as appropriate and evaluate response  - Consider cultural and social influences on pain and pain management  - Notify physician/advanced practitioner if interventions unsuccessful or patient reports new pain  Outcome: Progressing     Problem: INFECTION - ADULT  Goal: Absence or prevention of progression during hospitalization  Description: INTERVENTIONS:  - Assess and monitor for signs and symptoms of infection  - Monitor lab/diagnostic results  - Monitor all insertion sites, i e  indwelling lines, tubes, and drains  - Monitor endotracheal if appropriate and nasal secretions for changes in amount and color  - [place] appropriate cooling/warming therapies per order  - Administer medications as ordered  - Instruct and encourage patient and family to use good hand hygiene technique  - Identify and instruct in appropriate isolation precautions for identified infection/condition  Outcome: Progressing  Goal: Absence of fever/infection during neutropenic period  Description: INTERVENTIONS:  - Monitor WBC    Outcome: Progressing     Problem: SAFETY ADULT  Goal: Maintain or return to baseline ADL function  Description: INTERVENTIONS:  -  Assess patient's ability to carry out ADLs; assess patient's baseline for ADL function and identify physical deficits which impact ability to perform ADLs (bathing, care of mouth/teeth, toileting, grooming, dressing, etc )  - Assess/evaluate cause of self-care deficits   - Assess range of motion  - Assess patient's mobility; develop plan if impaired  - Assess patient's need for assistive devices and provide as appropriate  - Encourage maximum independence but intervene and supervise when necessary  - Involve family in performance of ADLs  - Assess for home care needs following discharge   - Consider OT consult to assist with ADL evaluation and planning for discharge  - Provide patient education as appropriate  Outcome: Progressing  Goal: Maintains/Returns to pre admission functional level  Description: INTERVENTIONS:  - Perform BMAT or MOVE assessment daily    - Set and communicate daily mobility goal to care team and patient/family/caregiver     - Collaborate with rehabilitation services on mobility goals if consulted  - Out of bed for toileting  - Record patient progress and toleration of activity level   Outcome: Progressing  Goal: Patient will remain free of falls  Description: INTERVENTIONS:  - Educate patient/family on patient safety including physical limitations  - Instruct patient to call for assistance with activity   - Consult OT/PT to assist with strengthening/mobility   - Keep Call bell within reach  - Keep bed low and locked with side rails adjusted as appropriate  - Keep care items and personal belongings within reach  - Initiate and maintain comfort rounds  - Make Fall Risk Sign visible to staff  - Apply yellow socks and bracelet for high fall risk patients  - Consider moving patient to room near nurses station  Outcome: Progressing     Problem: DISCHARGE PLANNING  Goal: Discharge to home or other facility with appropriate resources  Description: INTERVENTIONS:  - Identify barriers to discharge w/patient and caregiver  - Arrange for needed discharge resources and transportation as appropriate  - Identify discharge learning needs (meds, wound care, etc )  - Arrange for interpretive services to assist at discharge as needed  - Refer to Case Management Department for coordinating discharge planning if the patient needs post-hospital services based on physician/advanced practitioner order or complex needs related to functional status, cognitive ability, or social support system  Outcome: Progressing     Problem: Knowledge Deficit  Goal: Patient/family/caregiver demonstrates understanding of disease process, treatment plan, medications, and discharge instructions  Description: Complete learning assessment and assess knowledge base    Interventions:  - Provide teaching at level of understanding  - Provide teaching via preferred learning methods  Outcome: Progressing     Problem: GENITOURINARY - ADULT  Goal: Maintains or returns to baseline urinary function  Description: INTERVENTIONS:  - Assess urinary function  - Encourage oral fluids to ensure adequate hydration if ordered  - Administer IV fluids as ordered to ensure adequate hydration  - Administer ordered medications as needed  - Offer frequent toileting  - Follow urinary retention protocol if ordered  Outcome: Progressing  Goal: Urinary catheter remains patent  Description: INTERVENTIONS:  - Assess patency of urinary catheter  - If patient has a chronic griffin, consider changing catheter if non-functioning  - Follow guidelines for intermittent irrigation of non-functioning urinary catheter  Outcome: Progressing     Problem: SKIN/TISSUE INTEGRITY - ADULT  Goal: Skin Integrity remains intact(Skin Breakdown Prevention)  Description: Assess:  -Perform [place] assessment every shift  -Inspect skin when repositioning, toileting, and assisting with ADLS  -Assess extremities for adequate circulation and sensation     Bed Management:  -Have minimal linens on bed & keep smooth, unwrinkled  -Change linens as needed when moist or perspiring  -Keep HOB at 30 degrees     Skin Care:  -Avoid use of baby powder, tape, friction and shearing, hot water or constrictive clothing  -Relieve pressure over bony prominences using pillows  -Do not massage red bony areas  Outcome: Progressing  Goal: Incision(s), wounds(s) or drain site(s) healing without S/S of infection  Description: INTERVENTIONS  - Assess and document dressing, incision, wound bed, drain sites and surrounding tissue  - Provide patient and family education  - Perform skin care/dressing changes every shift  Outcome: Progressing

## 2021-06-22 NOTE — PROGRESS NOTES
Patient is awake and alert  Complains of incisional pain but controlled with PCA pump  BP improved  Pulse stable  Urine output adequate  Hemoglobin reported as 5 6  Transfusion ordered  3 units  Potassium within normal limits after replacement  Calcium low  Will replace  Check coag profile  Impression:  Clinically stable  Labs as above  Plan:  As above  Packed cells  Calcium  Continue [place]

## 2021-06-22 NOTE — NURSING NOTE
Dr Rivas Del informed of patient vital signs, specifically blood pressure and O2 saturation  PRBC transfusing without difficulty  Temperature remains wnl  Will continue to monitor

## 2021-06-23 ENCOUNTER — APPOINTMENT (INPATIENT)
Dept: CT IMAGING | Facility: HOSPITAL | Age: 61
DRG: 329 | End: 2021-06-23
Payer: COMMERCIAL

## 2021-06-23 ENCOUNTER — APPOINTMENT (INPATIENT)
Dept: RADIOLOGY | Facility: HOSPITAL | Age: 61
DRG: 329 | End: 2021-06-23
Payer: COMMERCIAL

## 2021-06-23 ENCOUNTER — APPOINTMENT (INPATIENT)
Dept: NON INVASIVE DIAGNOSTICS | Facility: HOSPITAL | Age: 61
DRG: 329 | End: 2021-06-23
Payer: COMMERCIAL

## 2021-06-23 PROBLEM — J96.01 ACUTE RESPIRATORY FAILURE WITH HYPOXIA (HCC): Status: ACTIVE | Noted: 2021-06-23

## 2021-06-23 PROBLEM — E44.1 MILD PROTEIN-CALORIE MALNUTRITION (HCC): Status: ACTIVE | Noted: 2021-06-23

## 2021-06-23 LAB
ABO GROUP BLD BPU: NORMAL
ALBUMIN SERPL BCP-MCNC: 2.4 G/DL (ref 3–5.2)
ALP SERPL-CCNC: 44 U/L (ref 43–122)
ALT SERPL W P-5'-P-CCNC: 13 U/L
ANION GAP SERPL CALCULATED.3IONS-SCNC: 7 MMOL/L (ref 5–14)
AST SERPL W P-5'-P-CCNC: 73 U/L (ref 14–36)
ATRIAL RATE: 101 BPM
BILIRUB SERPL-MCNC: 0.48 MG/DL
BPU ID: NORMAL
BUN SERPL-MCNC: 15 MG/DL (ref 5–25)
CALCIUM ALBUM COR SERPL-MCNC: 9.6 MG/DL (ref 8.3–10.1)
CALCIUM SERPL-MCNC: 8.3 MG/DL (ref 8.4–10.2)
CHLORIDE SERPL-SCNC: 104 MMOL/L (ref 97–108)
CO2 SERPL-SCNC: 20 MMOL/L (ref 22–30)
CREAT SERPL-MCNC: 0.74 MG/DL (ref 0.6–1.2)
CROSSMATCH: NORMAL
ERYTHROCYTE [DISTWIDTH] IN BLOOD BY AUTOMATED COUNT: 20.9 %
GFR SERPL CREATININE-BSD FRML MDRD: 88 ML/MIN/1.73SQ M
GLUCOSE SERPL-MCNC: 116 MG/DL (ref 70–99)
HCT VFR BLD AUTO: 36.3 % (ref 36–46)
HGB BLD-MCNC: 12.4 G/DL (ref 12–16)
LACTATE SERPL-SCNC: 1.3 MMOL/L (ref 0.7–2)
MAGNESIUM SERPL-MCNC: 1.4 MG/DL (ref 1.6–2.3)
MCH RBC QN AUTO: 35.2 PG (ref 26–34)
MCHC RBC AUTO-ENTMCNC: 34.1 G/DL (ref 31–36)
MCV RBC AUTO: 103 FL (ref 80–100)
P AXIS: 21 DEGREES
PHOSPHATE SERPL-MCNC: 3.2 MG/DL (ref 2.5–4.8)
PLATELET # BLD AUTO: 182 THOUSANDS/UL (ref 150–450)
PMV BLD AUTO: 7 FL (ref 8.9–12.7)
POTASSIUM SERPL-SCNC: 3.6 MMOL/L (ref 3.6–5)
PR INTERVAL: 128 MS
PROT SERPL-MCNC: 5.1 G/DL (ref 5.9–8.4)
QRS AXIS: 36 DEGREES
QRSD INTERVAL: 70 MS
QT INTERVAL: 310 MS
QTC INTERVAL: 401 MS
RBC # BLD AUTO: 3.52 MILLION/UL (ref 4–5.2)
SODIUM SERPL-SCNC: 131 MMOL/L (ref 137–147)
T WAVE AXIS: -3 DEGREES
TROPONIN I SERPL-MCNC: 2.41 NG/ML (ref 0–0.03)
TROPONIN I SERPL-MCNC: 2.88 NG/ML (ref 0–0.03)
TROPONIN I SERPL-MCNC: 2.97 NG/ML (ref 0–0.03)
UNIT DISPENSE STATUS: NORMAL
UNIT PRODUCT CODE: NORMAL
UNIT RH: NORMAL
VENTRICULAR RATE: 101 BPM
WBC # BLD AUTO: 8.8 THOUSAND/UL (ref 4.5–11)

## 2021-06-23 PROCEDURE — 99222 1ST HOSP IP/OBS MODERATE 55: CPT | Performed by: INTERNAL MEDICINE

## 2021-06-23 PROCEDURE — 94640 AIRWAY INHALATION TREATMENT: CPT

## 2021-06-23 PROCEDURE — 94668 MNPJ CHEST WALL SBSQ: CPT

## 2021-06-23 PROCEDURE — 94760 N-INVAS EAR/PLS OXIMETRY 1: CPT

## 2021-06-23 PROCEDURE — 85027 COMPLETE CBC AUTOMATED: CPT | Performed by: SPECIALIST

## 2021-06-23 PROCEDURE — 94664 DEMO&/EVAL PT USE INHALER: CPT

## 2021-06-23 PROCEDURE — 84100 ASSAY OF PHOSPHORUS: CPT | Performed by: FAMILY MEDICINE

## 2021-06-23 PROCEDURE — 30233K1 TRANSFUSION OF NONAUTOLOGOUS FROZEN PLASMA INTO PERIPHERAL VEIN, PERCUTANEOUS APPROACH: ICD-10-PCS | Performed by: SPECIALIST

## 2021-06-23 PROCEDURE — 99254 IP/OBS CNSLTJ NEW/EST MOD 60: CPT | Performed by: INTERNAL MEDICINE

## 2021-06-23 PROCEDURE — 99221 1ST HOSP IP/OBS SF/LOW 40: CPT | Performed by: FAMILY MEDICINE

## 2021-06-23 PROCEDURE — 99024 POSTOP FOLLOW-UP VISIT: CPT | Performed by: SPECIALIST

## 2021-06-23 PROCEDURE — 84484 ASSAY OF TROPONIN QUANT: CPT | Performed by: FAMILY MEDICINE

## 2021-06-23 PROCEDURE — 94669 MECHANICAL CHEST WALL OSCILL: CPT

## 2021-06-23 PROCEDURE — 94762 N-INVAS EAR/PLS OXIMTRY CONT: CPT

## 2021-06-23 PROCEDURE — 93010 ELECTROCARDIOGRAM REPORT: CPT | Performed by: INTERNAL MEDICINE

## 2021-06-23 PROCEDURE — 71045 X-RAY EXAM CHEST 1 VIEW: CPT

## 2021-06-23 PROCEDURE — 71275 CT ANGIOGRAPHY CHEST: CPT

## 2021-06-23 PROCEDURE — C9113 INJ PANTOPRAZOLE SODIUM, VIA: HCPCS | Performed by: SPECIALIST

## 2021-06-23 PROCEDURE — 87205 SMEAR GRAM STAIN: CPT | Performed by: INTERNAL MEDICINE

## 2021-06-23 PROCEDURE — 97167 OT EVAL HIGH COMPLEX 60 MIN: CPT

## 2021-06-23 PROCEDURE — 93005 ELECTROCARDIOGRAM TRACING: CPT

## 2021-06-23 PROCEDURE — 83605 ASSAY OF LACTIC ACID: CPT | Performed by: FAMILY MEDICINE

## 2021-06-23 PROCEDURE — 83735 ASSAY OF MAGNESIUM: CPT | Performed by: FAMILY MEDICINE

## 2021-06-23 PROCEDURE — G1004 CDSM NDSC: HCPCS

## 2021-06-23 PROCEDURE — 87070 CULTURE OTHR SPECIMN AEROBIC: CPT | Performed by: INTERNAL MEDICINE

## 2021-06-23 PROCEDURE — 87040 BLOOD CULTURE FOR BACTERIA: CPT | Performed by: FAMILY MEDICINE

## 2021-06-23 PROCEDURE — 93306 TTE W/DOPPLER COMPLETE: CPT

## 2021-06-23 PROCEDURE — 80053 COMPREHEN METABOLIC PANEL: CPT | Performed by: SPECIALIST

## 2021-06-23 PROCEDURE — 87106 FUNGI IDENTIFICATION YEAST: CPT | Performed by: INTERNAL MEDICINE

## 2021-06-23 RX ORDER — SODIUM CHLORIDE 30 MG/ML INHALATION SOLUTION 30 MG/ML
4 SOLUTION INHALANT
Status: DISCONTINUED | OUTPATIENT
Start: 2021-06-23 | End: 2021-06-25 | Stop reason: HOSPADM

## 2021-06-23 RX ORDER — DEXTROSE, SODIUM CHLORIDE, AND POTASSIUM CHLORIDE 5; .9; .15 G/100ML; G/100ML; G/100ML
75 INJECTION INTRAVENOUS CONTINUOUS
Status: DISCONTINUED | OUTPATIENT
Start: 2021-06-23 | End: 2021-06-24

## 2021-06-23 RX ORDER — IPRATROPIUM BROMIDE AND ALBUTEROL SULFATE 2.5; .5 MG/3ML; MG/3ML
SOLUTION RESPIRATORY (INHALATION)
Status: COMPLETED
Start: 2021-06-23 | End: 2021-06-23

## 2021-06-23 RX ORDER — IPRATROPIUM BROMIDE AND ALBUTEROL SULFATE 2.5; .5 MG/3ML; MG/3ML
3 SOLUTION RESPIRATORY (INHALATION)
Status: DISCONTINUED | OUTPATIENT
Start: 2021-06-23 | End: 2021-06-24

## 2021-06-23 RX ORDER — CEFTRIAXONE 1 G/50ML
1000 INJECTION, SOLUTION INTRAVENOUS EVERY 24 HOURS
Status: DISCONTINUED | OUTPATIENT
Start: 2021-06-23 | End: 2021-06-23

## 2021-06-23 RX ORDER — ALBUTEROL SULFATE 2.5 MG/3ML
SOLUTION RESPIRATORY (INHALATION)
Status: COMPLETED
Start: 2021-06-23 | End: 2021-06-23

## 2021-06-23 RX ORDER — METHYLPREDNISOLONE SODIUM SUCCINATE 40 MG/ML
40 INJECTION, POWDER, LYOPHILIZED, FOR SOLUTION INTRAMUSCULAR; INTRAVENOUS ONCE
Status: COMPLETED | OUTPATIENT
Start: 2021-06-23 | End: 2021-06-23

## 2021-06-23 RX ORDER — IPRATROPIUM BROMIDE AND ALBUTEROL SULFATE 2.5; .5 MG/3ML; MG/3ML
3 SOLUTION RESPIRATORY (INHALATION)
Status: DISCONTINUED | OUTPATIENT
Start: 2021-06-23 | End: 2021-06-23

## 2021-06-23 RX ORDER — VANCOMYCIN HYDROCHLORIDE 500 MG/100ML
12.5 INJECTION, SOLUTION INTRAVENOUS EVERY 12 HOURS
Status: DISCONTINUED | OUTPATIENT
Start: 2021-06-23 | End: 2021-06-25 | Stop reason: HOSPADM

## 2021-06-23 RX ORDER — CEFEPIME HYDROCHLORIDE 2 G/50ML
2000 INJECTION, SOLUTION INTRAVENOUS EVERY 12 HOURS
Status: DISCONTINUED | OUTPATIENT
Start: 2021-06-23 | End: 2021-06-25 | Stop reason: HOSPADM

## 2021-06-23 RX ORDER — KETOROLAC TROMETHAMINE 30 MG/ML
15 INJECTION, SOLUTION INTRAMUSCULAR; INTRAVENOUS EVERY 6 HOURS SCHEDULED
Status: DISCONTINUED | OUTPATIENT
Start: 2021-06-23 | End: 2021-06-24

## 2021-06-23 RX ORDER — FUROSEMIDE 10 MG/ML
20 INJECTION INTRAMUSCULAR; INTRAVENOUS ONCE
Status: DISCONTINUED | OUTPATIENT
Start: 2021-06-23 | End: 2021-06-23

## 2021-06-23 RX ORDER — FUROSEMIDE 10 MG/ML
20 INJECTION INTRAMUSCULAR; INTRAVENOUS ONCE
Status: COMPLETED | OUTPATIENT
Start: 2021-06-23 | End: 2021-06-23

## 2021-06-23 RX ORDER — ALBUTEROL SULFATE 2.5 MG/3ML
2.5 SOLUTION RESPIRATORY (INHALATION) EVERY 6 HOURS PRN
Status: DISCONTINUED | OUTPATIENT
Start: 2021-06-23 | End: 2021-06-24

## 2021-06-23 RX ADMIN — VANCOMYCIN HYDROCHLORIDE 500 MG: 500 INJECTION, SOLUTION INTRAVENOUS at 23:15

## 2021-06-23 RX ADMIN — KETOROLAC TROMETHAMINE 15 MG: 30 INJECTION, SOLUTION INTRAMUSCULAR; INTRAVENOUS at 13:46

## 2021-06-23 RX ADMIN — CEFTRIAXONE 1000 MG: 1 INJECTION, SOLUTION INTRAVENOUS at 07:36

## 2021-06-23 RX ADMIN — PANTOPRAZOLE SODIUM 40 MG: 40 INJECTION, POWDER, FOR SOLUTION INTRAVENOUS at 08:31

## 2021-06-23 RX ADMIN — SODIUM CHLORIDE SOLN NEBU 3% 4 ML: 3 NEBU SOLN at 19:38

## 2021-06-23 RX ADMIN — KETOROLAC TROMETHAMINE 15 MG: 30 INJECTION, SOLUTION INTRAMUSCULAR; INTRAVENOUS at 17:19

## 2021-06-23 RX ADMIN — ALBUTEROL SULFATE 2.5 MG: 2.5 SOLUTION RESPIRATORY (INHALATION) at 04:15

## 2021-06-23 RX ADMIN — METHYLPREDNISOLONE SODIUM SUCCINATE 40 MG: 40 INJECTION, POWDER, FOR SOLUTION INTRAMUSCULAR; INTRAVENOUS at 06:26

## 2021-06-23 RX ADMIN — HEPARIN SODIUM 5000 UNITS: 5000 INJECTION INTRAVENOUS; SUBCUTANEOUS at 21:06

## 2021-06-23 RX ADMIN — KETOROLAC TROMETHAMINE 15 MG: 30 INJECTION, SOLUTION INTRAMUSCULAR; INTRAVENOUS at 23:15

## 2021-06-23 RX ADMIN — SODIUM CHLORIDE SOLN NEBU 3% 4 ML: 3 NEBU SOLN at 11:35

## 2021-06-23 RX ADMIN — NICOTINE 14 MG: 14 PATCH, EXTENDED RELEASE TRANSDERMAL at 08:31

## 2021-06-23 RX ADMIN — IPRATROPIUM BROMIDE AND ALBUTEROL SULFATE 3 ML: 2.5; .5 SOLUTION RESPIRATORY (INHALATION) at 19:38

## 2021-06-23 RX ADMIN — VANCOMYCIN HYDROCHLORIDE 500 MG: 500 INJECTION, SOLUTION INTRAVENOUS at 11:19

## 2021-06-23 RX ADMIN — CEFEPIME HYDROCHLORIDE 2000 MG: 2 INJECTION, SOLUTION INTRAVENOUS at 10:37

## 2021-06-23 RX ADMIN — IPRATROPIUM BROMIDE AND ALBUTEROL SULFATE 3 ML: 2.5; .5 SOLUTION RESPIRATORY (INHALATION) at 11:35

## 2021-06-23 RX ADMIN — METRONIDAZOLE 500 MG: 500 SOLUTION INTRAVENOUS at 21:04

## 2021-06-23 RX ADMIN — HEPARIN SODIUM 5000 UNITS: 5000 INJECTION INTRAVENOUS; SUBCUTANEOUS at 13:45

## 2021-06-23 RX ADMIN — DEXTROSE, SODIUM CHLORIDE, AND POTASSIUM CHLORIDE 75 ML/HR: 5; .45; .15 INJECTION INTRAVENOUS at 04:31

## 2021-06-23 RX ADMIN — DEXTROSE, SODIUM CHLORIDE, AND POTASSIUM CHLORIDE 75 ML/HR: 5; .9; .15 INJECTION INTRAVENOUS at 17:19

## 2021-06-23 RX ADMIN — METRONIDAZOLE 500 MG: 500 SOLUTION INTRAVENOUS at 12:17

## 2021-06-23 RX ADMIN — FUROSEMIDE 20 MG: 10 INJECTION, SOLUTION INTRAVENOUS at 04:15

## 2021-06-23 RX ADMIN — AZITHROMYCIN MONOHYDRATE 500 MG: 500 INJECTION, POWDER, LYOPHILIZED, FOR SOLUTION INTRAVENOUS at 08:37

## 2021-06-23 RX ADMIN — HEPARIN SODIUM 5000 UNITS: 5000 INJECTION INTRAVENOUS; SUBCUTANEOUS at 06:26

## 2021-06-23 RX ADMIN — IOHEXOL 85 ML: 350 INJECTION, SOLUTION INTRAVENOUS at 06:12

## 2021-06-23 RX ADMIN — CEFEPIME HYDROCHLORIDE 2000 MG: 2 INJECTION, SOLUTION INTRAVENOUS at 21:12

## 2021-06-23 NOTE — PLAN OF CARE
Problem: MOBILITY - ADULT  Goal: Maintain or return to baseline ADL function  Description: INTERVENTIONS:  -  Assess patient's ability to carry out ADLs; assess patient's baseline for ADL function and identify physical deficits which impact ability to perform ADLs (bathing, care of mouth/teeth, toileting, grooming, dressing, etc )  - Assess/evaluate cause of self-care deficits   - Assess range of motion  - Assess patient's mobility; develop plan if impaired  - Assess patient's need for assistive devices and provide as appropriate  - Encourage maximum independence but intervene and supervise when necessary  - Involve family in performance of ADLs  - Assess for home care needs following discharge   - Consider OT consult to assist with ADL evaluation and planning for discharge  - Provide patient education as appropriate  Outcome: Progressing  Goal: Maintains/Returns to pre admission functional level  Description: INTERVENTIONS:  - Perform BMAT or MOVE assessment daily    - Set and communicate daily mobility goal to care team and patient/family/caregiver     - Collaborate with rehabilitation services on mobility goals if consulted  - Out of bed for toileting  - Record patient progress and toleration of activity level   Outcome: Progressing     Problem: PAIN - ADULT  Goal: Verbalizes/displays adequate comfort level or baseline comfort level  Description: Interventions:  - Encourage patient to monitor pain and request assistance  - Assess pain using appropriate pain scale  - Administer analgesics based on type and severity of pain and evaluate response  - Implement non-pharmacological measures as appropriate and evaluate response  - Consider cultural and social influences on pain and pain management  - Notify physician/advanced practitioner if interventions unsuccessful or patient reports new pain  Outcome: Progressing     Problem: INFECTION - ADULT  Goal: Absence or prevention of progression during hospitalization  Description: INTERVENTIONS:  - Assess and monitor for signs and symptoms of infection  - Monitor lab/diagnostic results  - Monitor all insertion sites, i e  indwelling lines, tubes, and drains  - Monitor endotracheal if appropriate and nasal secretions for changes in amount and color  - [place] appropriate cooling/warming therapies per order  - Administer medications as ordered  - Instruct and encourage patient and family to use good hand hygiene technique  - Identify and instruct in appropriate isolation precautions for identified infection/condition  Outcome: Progressing  Goal: Absence of fever/infection during neutropenic period  Description: INTERVENTIONS:  - Monitor WBC    Outcome: Progressing     Problem: SAFETY ADULT  Goal: Maintain or return to baseline ADL function  Description: INTERVENTIONS:  -  Assess patient's ability to carry out ADLs; assess patient's baseline for ADL function and identify physical deficits which impact ability to perform ADLs (bathing, care of mouth/teeth, toileting, grooming, dressing, etc )  - Assess/evaluate cause of self-care deficits   - Assess range of motion  - Assess patient's mobility; develop plan if impaired  - Assess patient's need for assistive devices and provide as appropriate  - Encourage maximum independence but intervene and supervise when necessary  - Involve family in performance of ADLs  - Assess for home care needs following discharge   - Consider OT consult to assist with ADL evaluation and planning for discharge  - Provide patient education as appropriate  Outcome: Progressing  Goal: Maintains/Returns to pre admission functional level  Description: INTERVENTIONS:  - Perform BMAT or MOVE assessment daily    - Set and communicate daily mobility goal to care team and patient/family/caregiver     - Collaborate with rehabilitation services on mobility goals if consulted    - Out of bed for toileting  - Record patient progress and toleration of activity level   Outcome: Progressing  Goal: Patient will remain free of falls  Description: INTERVENTIONS:  - Educate patient/family on patient safety including physical limitations  - Instruct patient to call for assistance with activity   - Consult OT/PT to assist with strengthening/mobility   - Keep Call bell within reach  - Keep bed low and locked with side rails adjusted as appropriate  - Keep care items and personal belongings within reach  - Initiate and maintain comfort rounds  - Make Fall Risk Sign visible to staff  - Apply yellow socks and bracelet for high fall risk patients  - Consider moving patient to room near nurses station  Outcome: Progressing     Problem: DISCHARGE PLANNING  Goal: Discharge to home or other facility with appropriate resources  Description: INTERVENTIONS:  - Identify barriers to discharge w/patient and caregiver  - Arrange for needed discharge resources and transportation as appropriate  - Identify discharge learning needs (meds, wound care, etc )  - Arrange for interpretive services to assist at discharge as needed  - Refer to Case Management Department for coordinating discharge planning if the patient needs post-hospital services based on physician/advanced practitioner order or complex needs related to functional status, cognitive ability, or social support system  Outcome: Progressing     Problem: Knowledge Deficit  Goal: Patient/family/caregiver demonstrates understanding of disease process, treatment plan, medications, and discharge instructions  Description: Complete learning assessment and assess knowledge base    Interventions:  - Provide teaching at level of understanding  - Provide teaching via preferred learning methods  Outcome: Progressing     Problem: GENITOURINARY - ADULT  Goal: Maintains or returns to baseline urinary function  Description: INTERVENTIONS:  - Assess urinary function  - Encourage oral fluids to ensure adequate hydration if ordered  - Administer IV fluids as ordered to ensure adequate hydration  - Administer ordered medications as needed  - Offer frequent toileting  - Follow urinary retention protocol if ordered  Outcome: Progressing  Goal: Urinary catheter remains patent  Description: INTERVENTIONS:  - Assess patency of urinary catheter  - If patient has a chronic griffin, consider changing catheter if non-functioning  - Follow guidelines for intermittent irrigation of non-functioning urinary catheter  Outcome: Progressing     Problem: SKIN/TISSUE INTEGRITY - ADULT  Goal: Skin Integrity remains intact(Skin Breakdown Prevention)  Description: Assess:  -Perform [place] assessment every shift  -Inspect skin when repositioning, toileting, and assisting with ADLS  -Assess extremities for adequate circulation and sensation     Outcome: Progressing  Goal: Incision(s), wounds(s) or drain site(s) healing without S/S of infection  Description: INTERVENTIONS  - Assess and document dressing, incision, wound bed, drain sites and surrounding tissue  - Provide patient and family education  Outcome: Progressing     Problem: Prexisting or High Potential for Compromised Skin Integrity  Goal: Skin integrity is maintained or improved  Description: INTERVENTIONS:  - Identify patients at risk for skin breakdown  - Assess and monitor skin integrity  - Assess and monitor nutrition and hydration status  - Monitor labs   - Assess for incontinence   - Turn and reposition patient  - Assist with mobility/ambulation  - Relieve pressure over bony prominences  - Avoid friction and shearing  - Provide appropriate hygiene as needed including keeping skin clean and dry  - Evaluate need for skin moisturizer/barrier cream  - Collaborate with interdisciplinary team   - Patient/family teaching  - Consider wound care consult   Outcome: Progressing

## 2021-06-23 NOTE — OCCUPATIONAL THERAPY NOTE
Occupational Therapy Evaluation     Patient Name: Alba Phillips  Today's Date: 6/23/2021  Problem List  Principal Problem:    Colocutaneous fistula  Active Problems:    Anxiety    Hyperlipidemia    Perforation of sigmoid colon due to diverticulitis    Acute respiratory failure with hypoxia (HCC)    Mild protein-calorie malnutrition ([address] )    Past Medical History  Past Medical History:   Diagnosis Date    Anxiety     Hyperlipidemia      Past Surgical History  Past Surgical History:   Procedure Laterality Date    FISTULA REPAIR Left     Lower abdomen    CA LAP,SURG,COLECTOMY, PARTIAL, W/ANAST N/A 6/21/2021    Procedure: RESECTION COLON SIGMOID LAPAROSCOPIC (attempted), open resection sigmoid colon, left salpingectomy;  Surgeon: Hans Roberts MD;  Location: [address] [place] [address] OR;  Service: General           06/23/21 1517   OT Last Visit   OT Visit Date 06/23/21   Note Type   Note type Evaluation   Restrictions/Precautions   Weight Bearing Precautions Per Order No   Other Precautions O2;Fall Risk, 8 L mid flow NC   Pain Assessment   Pain Assessment Tool Pain Assessment not indicated - pt denies pain   Home Living   Type of [facility] Multi-level;Stairs to enter with rails;Bed/bath upstairs;1/2 bath on main level   Bathroom Shower/Tub Walk-in shower   [address]   (none )   Prior Function   Level of [place] Independent with ADLs and functional mobility   Lives With Spouse   Receives Help From Family   ADL Assistance Independent   IADLs Independent   Falls in the last 6 months 0   Vocational Part time employment   Psychosocial   Psychosocial (WDL) WDL   ADL   Grooming Assistance 5  Supervision/Setup   UB Bathing Assistance 4  Minimal Assistance   LB Bathing Assistance 4  Minimal Assistance   [address] 4  [address] 3  Moderate [facility] [address]  3  Moderate Assistance   Bed Mobility   Supine to Sit 4  Minimal assistance   Additional items Assist x 1; Increased time required   Sit to Supine 4  Minimal assistance   Additional items Assist x 1; Increased time required   Transfers   Sit to Stand 4  Minimal assistance   Additional items Assist x 1   Stand to Sit 4  Minimal assistance   Additional items Assist x 1   Toilet transfer 4  Minimal assistance   Additional items Assist x 1; Increased time required;Commode   Functional Mobility   Functional Mobility 4  Minimal assistance   Additional Comments x 1   Additional items Rolling walker   Balance   Static Sitting Good   Dynamic Sitting Fair +   Static Standing Fair   Dynamic Standing Fair -   Ambulatory Fair -   Activity Tolerance   Activity Tolerance Patient tolerated treatment well   RUE Assessment   RUE Assessment WFL   LUE Assessment   LUE Assessment WFL   Hand Function   Gross Motor Coordination Functional   Fine Motor Coordination Functional   Sensation   Light Touch No apparent deficits   Proprioception   Proprioception No apparent deficits   Perception   Inattention/Neglect Appears intact   Cognition   Overall Cognitive Status WFL   Arousal/Participation Alert; Cooperative   Attention Within functional limits   Orientation Level Oriented X4   Memory Within functional limits   Following Commands Follows all commands and directions without difficulty   Assessment   Limitation Decreased high-level ADLs; Decreased ADL status; Decreased UE strength;Decreased UE ROM; Decreased endurance;Decreased Safe judgement during ADL   Prognosis Good   Assessment   Pt is a 62 y o  female seen for OT evaluation s/p admit to [facility] on 6/21/2021 w/ Colocutaneous fistula  Pt status post colon resection, ex lap on 6/21  See above for extensive list of comorbidities affecting Pt's functional performance at time of assessment  Personal factors affecting Pt at time of IE include:difficulty performing ADLS, difficulty performing IADLS  and health management    Prior to admission, Pt was independent with ADLs, did not use AD  Upon evaluation: Pt requires [place] for all transfers and ambulation  Pt very limited by decreased activity tolerance and endurance, on 8L mid flow NC throughout with saturations ranging 90-93% following stand-pivot to commode  Pt requires assist with all LB ADLs at this time  The following deficits impact occupational performance: weakness, decreased strength, decreased balance, decreased tolerance, impaired problem solving and decreased safety awareness  Pt to benefit from continued skilled OT services while in the hospital to address deficits as defined above and maximize level of functional independence w ADL's and functional mobility  Occupational performance areas to address include: grooming, bathing/shower, toilet hygiene, dressing, health maintenance, functional mobility and clothing management  From OT standpoint, recommendation at time of d/c would be post acute rehab  Goals   STG Time Frame   (1 week)   Short Term Goals 1    2    3       Plan   Treatment Interventions ADL retraining;Functional transfer training;UE strengthening/ROM; Endurance training;Continued evaluation; Energy conservation; Activityengagement   Goal Expiration Date 06/30/21   OT Frequency 2-3x/wk   Recommendation   OT Discharge Recommendation Post acute rehab   AM-PAC Daily Activity Inpatient   Lower Body Dressing 3   Bathing 3   Toileting 3   Upper Body Dressing 3   Grooming 4   Eating 4   Daily Activity Raw Score 20   Daily Activity Standardized Score (Calc for Raw Score >=11) 42 03

## 2021-06-23 NOTE — QUICK NOTE
Patient reviewed  Reports feeling better with regards to her shortness of breath  Denies having any chest pain  Utilizing incentive spirometry  Repeat BP 90s/60s  Patient alert and sitting up comfortably in bed in no obvious distress  Decreased air entry bibasal moderate however good air entry noted to bilateral upper zones  No crepitations, wheeze auscultated  Abdomen nondistended  Soft  Wound clean with sutures intact  Dressing to left lower quadrant clean and dry  Plan to continue management with triple antibiotic coverage  Follow-up blood culture and sputum culture  Continue maintenance IV fluid, NPO to commence clear liquids in a m  As per surgery

## 2021-06-23 NOTE — CONSULTS
Inpatient Consultation - David Pickard    Patient Information: Melo Medellin 62 y o  female MRN: [id number]  Unit/Bed#: 7T [facility] 705-01 Encounter: [id number]  PCP: Madeline Arreaga DO  Date of Admission:  6/21/2021  Date of Consultation: 06/23/21  Requesting Physician: Tricia Richards MD    Reason For Consultation:   Hypoxia and SOB     Assessment/Plan:    Acute respiratory failure with hypoxia [facility]  Assessment & Plan  Acute respiratory failure with saturation in the low 80s status post sigmoid colon resection due to diverticulitis complicated by abscess  Hypoxia associated with  Tachycardia, tachypnea and hypotension   Status post IV Lasix 20 mg, and albuterol nebulizer treatment  Differential diagnosis include PE, pneumothorax, cardiac tamponade, atelectasis, pleural effusion,  Pneumonia etc  Physical exam notable for diffuse decreased air entry, no crackles or rhonchi or here  There is no sign of fluid overload    - STAT CT PE order which ruled out PE, however shows small pleural effusion and ground-glass opacity  -given patient history of smoking with no previous diagnosis of COPD, we cannot rule out COPD exasperation this will give 40 mg of IV Solu-Medrol,  -will get blood cultures  -will order procalcitonin  -will start patient on azithromycin and ceftriaxone  -titrate of oxygen for a saturation of above 92%  -continue respiratory protocol  - EKG, troponin,   -stop IV fluid   -will continue to monitor closely      VTE Prophylaxis: Heparin  / sequential compression device     Recommendations for Discharge:  · Per primary team     Counseling / Coordination of Care Time: 45 minutes  Greater than 50% of total time spent on patient counseling and coordination of care  Collaboration of Care:  Were Recommendations Directly Discussed with Primary Treatment Team? - Yes nursing staff     History of Present Illness:    Melo Medellin is a 62 y o  female with a history of complicated sigmoid diverticulitis who is originally admitted to the Surgical Services service for laparoscopic sigmoid colon resection  We are consulted for management of hypoxia      She was diagnosed with diverticulitis with possible fistula in September 2020 at [facility] for which she was treated medically  She was discharged however she continued to have left lower quadrant pain for which a repeat CT demonstrated an abscess  Patient underwent a CT-guided abscess drainage with placement of drain and was treated with IV antibiotic and oral antibiotic and discharged  She continued to have continue drainage and pain and was referred to Dr Ree Rodriguez office for further management  She was admitted at [facility] on 06/21 for surgical intervention  Patient underwent sigmoidectomy on 06/21  Postop course has been complicated with hypertension and a drop in hemoglobin which required transfusion with 3 units of blood  In the morning of 6/23 we Receive a tiger text from patient's nurse stating that patient has been saturating in the low 70s, and wanted us to come up to evaluate the patient  Per nursing staff patient has been saturating in the low 80s in the past several hours, primary team was contacted and they order respiratory protocol along with 20 mg of IV Lasix  Even after this intervention patient continued to saturate in the low 80s  We Went up to evaluate the patient, patient was status post respiratory therapy with albuterol nebulizer and on 8 L of nasal cannula and continued to saturate in the low 80s  Of note patient has been hypotensive for past several hours post surgery and her hemoglobin dropped to 5 6 down from 8 file on she received 3 units of blood transfusion repeat CBC shows an improvement in of hemoglobin to 10 2  At initial evaluation patient has increased work of breathing with accessory muscle use, there was no conversational dyspnea    Her lung examination was notable for decreased air entry in all lung fields  There was no sign of fluid overload  She endorses SOB and abdominal pain, denies chest pain, palpitation, fever, chills, pain on her calf  Stat CT PE was ordered to rule out PE  Review of Systems:    Review of Systems   Constitutional: Negative for chills, diaphoresis and fever  HENT: Negative for congestion and voice change  Respiratory: Positive for shortness of breath  Negative for choking, chest tightness, wheezing and stridor  Cardiovascular: Negative for chest pain, palpitations and leg swelling  Gastrointestinal: Positive for abdominal pain  Negative for constipation, diarrhea, nausea and vomiting  Psychiatric/Behavioral: Negative for agitation  Past Medical and Surgical History:   Past Medical History:   Diagnosis Date    Anxiety     Hyperlipidemia      Past Surgical History:   Procedure Laterality Date    FISTULA REPAIR Left     Lower abdomen    [place] LAP,SURG,COLECTOMY, PARTIAL, W/ANAST N/A 6/21/2021    Procedure: RESECTION COLON SIGMOID LAPAROSCOPIC (attempted), open resection sigmoid colon, left salpingectomy;  Surgeon: Mary Kay Brothers MD;  Location:  MAIN OR;  Service: General     Meds/Allergies: Allergies: No Known Allergies  Prior to Admission Medications   Prescriptions Last Dose Informant Patient Reported?  Taking?   ibuprofen (MOTRIN) 200 mg tablet Past Month at Unknown time Self Yes Yes   Sig: Take 200 mg by mouth every 6 (six) hours as needed   sertraline (ZOLOFT) 50 mg tablet 6/20/2021 at Unknown time Self Yes Yes   Sig: Take 100 mg by mouth daily      Facility-Administered Medications: None     Social History:     Social History     Socioeconomic History    Marital status: /Civil Union     Spouse name: Not on file    Number of children: Not on file    Years of education: Not on file    Highest education level: Not on file   Occupational History    Not on file   Tobacco Use    Smoking status: Current Every Day Smoker Packs/day: 1 00     Types: Cigarettes    Smokeless tobacco: Never Used   Vaping Use    Vaping Use: Never used   Substance and Sexual Activity    Alcohol use: Yes     Alcohol/week: 2 0 standard drinks     Types: 1 Cans of beer, 1 Shots of liquor per week    Drug use: Never    Sexual activity: Not Currently     Partners: Male   Other Topics Concern    Not on file   Social History Narrative    Not on file     Social Determinants of Health     Financial Resource Strain:     Difficulty of Paying Living Expenses:    Food Insecurity:     Worried About Running Out of Food in the Last Year:     [address] in the Last Year:    Transportation Needs:     Lack of Transportation (Medical):  Lack of Transportation (Non-Medical):    Physical Activity:     Days of Exercise per Week:     Minutes of Exercise per Session:    Stress:     Feeling of Stress :    Social Connections:     Frequency of Communication with Friends and Family:     Frequency of Social Gatherings with Friends and Family:     Attends [employer] Services:     Active Member of Clubs or Organizations:     Attends Club or Organization Meetings:     Marital Status:    Intimate Partner Violence:     Fear of Current or Ex-Partner:     Emotionally Abused:     Physically Abused:     Sexually Abused:        Family History:  History reviewed  No pertinent family history  Physical Exam:     Vitals:   Blood Pressure: 90/62 (06/23/21 0737)  Pulse: 100 (06/23/21 0737)  Temperature: (!) 97 1 °F (36 2 °C) (06/23/21 0737)  Temp Source: Temporal (06/23/21 0737)  Respirations: (!) 25 (06/23/21 0737)  Height: 5' 3" (160 cm) (06/22/21 1648)  Weight - Scale: 44 9 kg (99 lb) (06/22/21 1648)  SpO2: (!) 88 % (06/23/21 0737)    Physical Exam  Constitutional:       General: Distressed: mild distress  Appearance: She is not ill-appearing or toxic-appearing     HENT:      Mouth/Throat:      Mouth: Mucous membranes are moist    Cardiovascular:      Rate and Rhythm: Tachycardia present  Heart sounds: No murmur heard  No friction rub  Pulmonary:      Effort: Respiratory distress present  Breath sounds: No wheezing, rhonchi or rales  Comments: Diffuse Decrease air entry   Chest:      Chest wall: No tenderness  Abdominal:      General: There is no distension  Tenderness: There is abdominal tenderness (on incision site )  There is no right CVA tenderness, left CVA tenderness or rebound  Musculoskeletal:         General: No swelling, tenderness or deformity  Right lower leg: No edema  Left lower leg: No edema  Skin:     General: Skin is warm and dry  Coloration: Skin is not pale  Findings: No bruising, erythema, lesion or rash  Neurological:      General: No focal deficit present  Mental Status: She is alert and oriented to person, place, and time  Psychiatric:         Mood and Affect: Mood normal          Additional Data:     Lab Results: I have personally reviewed pertinent reports  Results from last 7 days   Lab Units 06/23/21  0454 06/22/21  1602 06/22/21  0512   WBC Thousand/uL 8 80 8 20 4 40*   HEMOGLOBIN g/dL 12 4 10 2* 5 6*   HEMATOCRIT % 36 3 28 8* 15 9*   PLATELETS Thousands/uL 182 176 142*   NEUTROS PCT %  --   --  87*   LYMPHS PCT %  --   --  9*   LYMPHO PCT %  --  3*  --    MONOS PCT %  --   --  4   MONO PCT %  --  1  --    EOS PCT %  --   --  0     Results from last 7 days   Lab Units 06/23/21  0454   POTASSIUM mmol/L 3 6   CHLORIDE mmol/L 104   CO2 mmol/L 20*   BUN mg/dL 15   CREATININE mg/dL 0 74   CALCIUM mg/dL 8 3*   ALK PHOS U/L 44   ALT U/L 13   AST U/L 73*     Results from last 7 days   Lab Units 06/22/21  1602   INR  1 75*       Imaging: I have personally reviewed pertinent reports  No results found      EKG, Pathology, and Other Studies Reviewed on Admission:   EKG  Result Date: 06/23/21  Impression:  Tachycardia non specific t wave abnormalities     ** Please Note: This note has been constructed using a voice recognition system   **    Saniya Chen MD  06/23/21  8:14 AM

## 2021-06-23 NOTE — NURSING NOTE
RN noticed patient oxygen saturation sitting in low 70's around 72-75% on 5L  Patient using incentive spirometry and doing deep breathe  Patient is in a sitting position, patient stats she has SOB but does not appear to be in distress  Vitals taken at this time, BP 98/50 respirations 22 heart rate 106 and O2 76% 5L  Dr Barbara Jordan notified  No new orders at this time  Per Dr Barbara Jordan RN placed patient back on 4L  Patient is in no distress at this time  Call bell in reach

## 2021-06-23 NOTE — PROGRESS NOTES
Patient seen and examined  Much more alert  States she feels much better  Shortness of breath improved  Moving much better  O2 sats much improved  Mid 90s  Particularly when she is out of bed  Other vital signs acceptable  BP labile  (volume?)    Belly soft  Incision unremarkable  Impression:  Significant pulmonary issues appear to be improved  Plan:  Start clear liquids in a m  Continue pulmonary maneuvers  Empiric antibiotics  Sputum cultures  Maintenance IV for now

## 2021-06-23 NOTE — NURSING NOTE
RN noticed patient Oxygen saturation to be staying in the low 80's on 4L of O2  RN has patient use the incentive spirometry, also cough and deep breathe multiple times  RN moved pulse ox probe to patients ear so it would  better  Dr Branden Gasca notified and he did not seemed to be worried  Said to continue Incentive and deep breathes  Call bell in reach

## 2021-06-23 NOTE — QUICK NOTE
Received during sign-out from night team that patient was having episodes of desaturation overnight  She was evaluated by night team and respiratory team and patient had to be placed on oxygen supplementation with up titration from nasal cannula, midflow and now currently on face mask saturating between 88-89% on   8L  CT/ PE chest imaging was done to rule out PE given patient's acute onset of symptoms  Results showed presence of small bilateral pleural effusions more on right side than left side with mild pulmonary emphysematous changes as well as patchy alveolar and ground glass opacities in posterior and peripheral right upper lobe, which could represent possible differentials including : infectious process vs inflammation vs edema cardiogenic/ ARDS  There was also noticed small amount of ascites as well as residual intraperitoneal free air most likely residual from surgical intervention  Vital signs overnight with presence of tachypnea, tachycardia with hypoxia due to which patient met Sepsis criteria for which blood cultures ordered, lactic acid, procalcitonin and patient started on Iv antibiotics with azithromycin and ceftriaxone  Currently receiving IV fluids with dextrose with KCL  Lab work remarkable for elevated troponin at 2 97, she reports chest discomfort when breathing at this time  Findings most likely related to demand ischemia  Will trend troponin with EKG and cardiology notified  Agrees that troponin elevation might be related to demand ischemia secondary to acute hypoxic  and inflammatory process, echocardiogram and chest x ray ordered as well  Patient evaluated at bedside  She was alert, awake and oriented x3 and reports feeling tired, mild distress present when breathing although able to speak in full sentences  Also reports abdominal discomfort, some chest discomfort when breathing  Physical Exam  Vitals reviewed     Constitutional:       Appearance: She is not ill-appearing, toxic-appearing or diaphoretic  Comments: Mild respiratory distress on face mask; able to have full conversation   Eyes:      Extraocular Movements: Extraocular movements intact  Pulmonary:      Effort: Respiratory distress present  Breath sounds: No wheezing  Comments: Decreased breath sounds on the right upper/lower lobes; audible breath sounds on the left upper/ lower lobes  Abdominal:      General: Abdomen is flat  Bowel sounds are normal  There is no distension  Palpations: Abdomen is soft  Tenderness: There is abdominal tenderness ( mild tenderness upon palpation)  Musculoskeletal:         General: No swelling, tenderness, deformity or signs of injury  Normal range of motion  Right lower leg: No edema  Left lower leg: No edema  Skin:     General: Skin is warm  Coloration: Skin is not jaundiced or pale  Findings: No bruising, erythema, lesion or rash  Neurological:      Mental Status: She is alert and oriented to person, place, and time  Mental status is at baseline  Psychiatric:         Mood and Affect: Mood normal          Explained to patient about current requirements for oxygen supplementation and importance of keeping face mask on, she is taking mask on/off when talking on the phone  Spoke with surgeon Dr Reji Sutton who is aware of overnight events and agreed to plan  Also reached out to cardiology and pulmonary critical care and aware as well  Discussed also with Attending Dr Cam Armstrong who agreed to plan above         Plan:  1)consult cardiology, pulmonary critical care, place on telemetry with continuous pulse oximetry  2)D/C IVF at this time given presence of pleural effusions to avoid fluid overload  3)Hold lasix given soft SBP to avoid further hypotension  4)continue with Oxygen supplementation to maintain O2 saturation >90%  5)continue IV Abx with vancomycin, cefepime and flagyl per Pulmonary recommendations for treatment of possible infectious lung process  6) follow up labs: blood cultures, lactic acid, pro-calcitonin   7)trend troponin x 2 with EKG   8)respiratory protocol on board

## 2021-06-23 NOTE — NURSING NOTE
RN noticed patients O2 stats still sitting in low 80's and the lowest it dropped was to 75% on 4L, at that time RN put patient on 5 L and patient is still staying at 80% or below  RN had patient use incentive spirometry and cough and deep breathe  Messaged Dr Luiza Renae, waiting for response  Patient shows no distress at this time  Call bell in reach

## 2021-06-23 NOTE — CONSULTS
Pulmonary Medicine-Consultation  Marlene Garcia 62 y o  female MRN: [id number]  Unit/Bed#: 7T [facility] 705-01 Encounter: [id number]      Assessment/Plan:    Hypoxemic respiratory failure  · Likely multifactorial, RLL mucus plug/collapse, bilateral pneumonia, underlying untreated COPD, has a very small pleural effusion bilaterally on bedside ultrasound  · No reported postop respiratory complications, was on 3 LPM until last night  · Noted hypoxemia to the 70s on 3 LPM, on 15 L mid flow this a m  With SpO2 89-90%  · Still with abdominal pain/tenderness, not able to to cough appropriately  · CT PE without pulmonary embolism, bilateral ground-glass opacities and RLL bronchus mucus plugging    Plan:  · Mucus clearing maneuver as below  · Agree with broad-spectrum antibiotics, vancomycin/cefepime/metronidazole  · Check sputum cultures, MRSA swab, check procalcitonin  · Provide proper analgesia, now on hydromorphone PCA pump, may require longer coverage, probably p o  Oxy      Right lower lobe collapse/mucus plugging  · Likely due to limited chest expansion/abdominal pain and tenderness from recent surgery  · Start mucus clearing maneuver, q 2 hours  · Incentive spirometer,   · flutter valve,  · Started on scheduled DuoNeb q 6 hours  · Hypertonic saline nebs q 6 hours  · Frequent chest PT per the respiratory team  · He moved from bed to the chair, please order PT OT    Active tobacco abuse  · Continue nicotine patches  · Needs outpatient follow-up with Pulmonary with a PFT      History of Present Illness   Physician Requesting Consult: Anuja Pablo MD    Reason for Consult / Principal Problem:  Hypoxemic respiratory failure, concern for pleural effusion/lung collapse    HPI:   80-year-old female with a history of an anxiety/depression, dyslipidemia tobacco abuse/40 pack year actively smoke 1 pack per day, moderate alcohol abuse, couple drinks every day recurrent diverticulitis/abdominal abscess diverticulosis      She presented to [facility] 6/21 with left lower quadrant pain, found to have complicated diverticulitis with cutaneous fistula  She previously underwent CT-guided abscess drainage with placement of a drain at [facility], treated with antibiotics  Also underwent repeat IR drainage x2  She underwent ex lap, transition to open, sigmoid colon resection, left salpingectomy  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  Noted postoperative hypotension on 58/66 with systolic blood pressure in the 70s, required 2-3 LPM oxygen  Since yesterday evening, noted progressive hypoxemia, SpO2 in the 70s on 3 LPM, increased to 8 LPM mid flow this a m  CT PE showed no pulmonary embolism, collapsed right lower lobe, mucus plug at the RLL bronchus, small bilateral pleural effusion  Also showed bilateral emphysematous changes, ground-glass opacities/patches at the left lung and RUL  Given Lasix 20 mg IV x1 this a m , 20 IV yesterday at 6:22 p m  Hemoglobin was falsely checked, found to be 5 g for which she was given 2 units of PRBCs  Solu-Medrol 40 mg IV x1 given this a m  On my evaluation, patient is main mild distress because of the abdominal pain  No significant increased work of breathing  Using a PCA pump frequently, reports a weak cough and feeling of chest congestion  Using the ultrasound probe from the echocardiography tech, I found minimal pleural effusion bilaterally, evidence of collapsed lung/consolidation at the RLL  Patient states that she actively smoke 1 pack per day, never been diagnosed with COPD, not be on inhalers before      Inpatient consult to Pulmonology  Consult performed by: Danyelle Flores MD  Consult ordered by: Jodi Hill MD          Review of Systems  + abdominal pain,+ fatigue, all other systems reviewed and were negative    Historical Information   Past Medical History:   Diagnosis Date    Anxiety     Hyperlipidemia      Past Surgical History:   Procedure Laterality Date    FISTULA REPAIR Left     Lower abdomen    [place] LAP,SURG,COLECTOMY, PARTIAL, W/ANAST N/A 6/21/2021    Procedure: RESECTION COLON SIGMOID LAPAROSCOPIC (attempted), open resection sigmoid colon, left salpingectomy;  Surgeon: Makenzie Blackwell MD;  Location: [facility] MAIN OR;  Service: General     Social History   Social History     Substance and Sexual Activity   Alcohol Use Yes    Alcohol/week: 2 0 standard drinks    Types: 1 Cans of beer, 1 Shots of liquor per week     Social History     Substance and Sexual Activity   Drug Use Never     Social History     Tobacco Use   Smoking Status Current Every Day Smoker    Packs/day: 1 00    Types: Cigarettes   Smokeless Tobacco Never Used     E-Cigarette/Vaping    E-Cigarette Use Never User      E-Cigarette/Vaping Substances    Nicotine No     THC No     CBD No     Flavoring No        Family History: non-contributory    Meds/Allergies   all current active meds have been reviewed    No Known Allergies    Objective   Vitals: Blood pressure 90/62, pulse 100, temperature (!) 97 1 °F (36 2 °C), temperature source Temporal, resp  rate (!) 25, height 5' 3" (1 6 m), weight 44 9 kg (99 lb), SpO2 95 %  8 lpm midflow,Body mass index is 17 54 kg/m²  Intake/Output Summary (Last 24 hours) at 6/23/2021 1121  Last data filed at 6/23/2021 0700  Gross per 24 hour   Intake 1395 19 ml   Output 2375 ml   Net -979 81 ml     Invasive Devices     Peripheral Intravenous Line            Peripheral IV 06/21/21 Left Hand 2 days    Peripheral IV 06/21/21 Right;Ventral (anterior) Forearm 1 day    Peripheral IV 06/23/21 Left Antecubital <1 day          Drain            Urethral Catheter Latex 16 Fr  2 days                Physical Exam  Gen: not in acute distress, frail, ill-appearing, Body mass index is 17 54 kg/m²    HEENT: supple, no adenopathy, PERRLA  Chest: normal respiratory efforts, coarse breath sounds on the left, absent breath sounds on the right base, moderate air movement at the right upper lung field  CV: RRR, no murmurs appreciated  Abdomen: soft, + moderate diffuse tenderness, incisions with metal sutures in place/no gross or drainage  Extremities: no edema  Neuro: AAO X3, no focal motor deficit        Lab Results: I have personally reviewed pertinent lab results  Imaging Studies: I have personally reviewed pertinent films in PACS     EKG, Pathology, and Other Studies: I have personally reviewed pertinent films in PACS     Pulmonary Results (PFTs, PSG):  None in file      None    Portions of the record may have been created with voice recognition software  Occasional wrong word or "sound a like" substitutions may have occurred due to the inherent limitations of voice recognition software  Read the chart carefully and recognize, using context, where substitutions have occurred

## 2021-06-23 NOTE — CASE MANAGEMENT
LOS 2 Days  Pt is not a bundle  Pt is not a 30 day readmission  Unplanned readmission score is 7 (Green, low risk)  CM met with pt at bedside  CM name and role reviewed  Pt reported that she lives in a 3 story home with her , youngest daughter and her grandchildren  There are 4 steps to enter  Pt reported that she is ADL independent  No dme needs  She ambulates on her own without assistance  She reported that she is employed at an [employer]  Pt denied drug use or hx of it  She admitted to drinking a few beers daily  She declined any outpatient resources for alcohol use  She said that it does not affect her daily functioning  She reported that she is on zoloft for depression and anxiety  She reported that her PCP prescribes the zoloft  Her PCP is [facility]  Pt reported that she does not have any hx of IP psych tx  She reported no psychiatrist in the community  She reported no hx of VNA or SNF  She does not feel that she needs VNA at this time  She uses [facility] in [place]  Pt said that she has prescription coverage through [facility]  Pt said that her , Lizzy Suarez is her POA  Pt drives  She said that her  will transport her home at discharge  CM reviewed discharge planning process including the following: identifying help at home, patient preference for discharge planning needs, pharmacy preference, and availability of treatment team to discuss questions or concerns patient and/or family may have regarding understanding medications and recognizing signs and symptoms once discharged  CM also encouraged patient to follow up with all recommended appointments after discharge  Patient advised of importance for patient and family to participate in managing patients medical well being

## 2021-06-23 NOTE — PROGRESS NOTES
Patient seen 7:30 p m  yesterday and 7:30 a m  today  She was quite good last night at 7:30 p m  Hemoglobin of 5 6 was erroneous   Repeat after 2 units of blood demonstrated a 10 2 hemoglobin  After 1 additional unit of blood it came back 12 4  Unfortunately developed problems throughout the night and early morning  Discussed situation with med surge nurse, and nurse supervisor throughout the night  Progressively worsening O2 saturations  Patient diuresed  Patient given breathing treatments  O2 increased etcetera  Unfortunately minimal improvement with these maneuvers  Consulted family practice in the wee hours of the morning and we thank them for their excellent help  CT scan chest demonstrated emphysematous changes, atelectasis in both lower lobes most likely related to mucus plugging, small bilateral effusions  Negative for pulmonary embolism  Negative for CHF  Troponin 2 97  EKG no acute changes  Sinus tach  Consultation obtained with Cardiology and Pulmonary Critical Care  Help much appreciated  Both felt it was mainly related to atelectasis, mucus plugging and possibly some pneumonia  No acute cardiac event  Empiric antibiotics were begun  Mucus clearing maneuvers begun  Chest percussion  Increased pulmonary toilet, out of bed, incentive spirometry,  DuoNebs  Better pain control to allow coughing and deep breathing    Last O2 saturation was 98% on 8 L per mid flow nasal cannula  Patient out of bed ambulated with assistance  Toradol added to analgesia regime  Will re-evaluate the patient later on this afternoon  Once again consultation help much appreciated

## 2021-06-23 NOTE — UTILIZATION REVIEW
Initial Clinical Review    Elective    IP      surgical procedure    Age/Sex: 62 y o  female     Surgery Date:    6/21/21    PROCEDURE   RESECTION COLON SIGMOID LAPAROSCOPIC (attempted), open resection sigmoid colon, left salpingectomy (N/A) mobilization of splenic flexure, rigid procto    Anesthesia:    general    Operative Findings: Colocutaneous fistula from sigmoid colon diverticulitis  Left tube and ovary densely adherent to the sigmoid colon and the fistula        POD#1 Progress Note:   6/22     Continue  Dilaudid PCA  And IVF  Hemoglobin  5 6  This am   And  Transfused  3  u PRBC  Continue to monitor labs  Pain controlled  With PCA  Replace electrolytes as  Needed  Remains NPO  Admission Orders: Date/Time/Statement:   Admission Orders (From admission, onward)     Ordered        06/21/21 1508  Inpatient Admission  Once                   Orders Placed This Encounter   Procedures    Inpatient Admission     Standing Status:   Standing     Number of Occurrences:   1     Order Specific Question:   Level of Care     Answer:   Med Surg [16]     Order Specific Question:   Estimated length of stay     Answer:   More than 2 Midnights     Order Specific Question:   Certification     Answer:   I certify that inpatient services are medically necessary for this patient for a duration of greater than two midnights  See H&P and MD Progress Notes for additional information about the patient's course of treatment       Vital Signs: BP 90/62 (BP Location: Right arm)   Pulse 100   Temp (!) 97 1 °F (36 2 °C) (Temporal)   Resp (!) 25   Ht 5' 3" (1 6 m)   Wt 44 9 kg (99 lb)   SpO2 (!) 88%   BMI 17 54 kg/m²     Pertinent Labs/Diagnostic Test Results:   Results from last 7 days   Lab Units 06/18/21  1318   SARS-COV-2  Negative     Results from last 7 days   Lab Units 06/23/21  0454 06/22/21  1602 06/22/21  0512 06/21/21  1640   WBC Thousand/uL 8 80 8 20 4 40* 5 70   HEMOGLOBIN g/dL 12 4 10 2* 5 6* 8 1* HEMATOCRIT % 36 3 28 8* 15 9* 23 8*   PLATELETS Thousands/uL 182 176 142* 236   NEUTROS ABS Thousands/µL  --   --  3 80 4 80   TOTAL NEUT ABS Thousand/uL  --  7 87*  --   --    BANDS PCT %  --  7  --   --          Results from last 7 days   Lab Units 06/23/21  0454 06/22/21  1602 06/22/21  0509 06/21/21  1640   SODIUM mmol/L 131* 134* 131* 135*   POTASSIUM mmol/L 3 6 3 5* 3 8 2 9*   CHLORIDE mmol/L 104 106 108 105   CO2 mmol/L 20* 24 18* 24   ANION GAP mmol/L 7 4* 5 6   BUN mg/dL 15 15 9 11   CREATININE mg/dL 0 74 0 73 0 45* 0 59*   EGFR ml/min/1 73sq m 88 90 109 100   CALCIUM mg/dL 8 3* 8 5 7 0* 7 8*   MAGNESIUM mg/dL 1 4*  --   --   --    PHOSPHORUS mg/dL 3 2  --   --   --      Results from last 7 days   Lab Units 06/23/21  0454 06/21/21  1640   AST U/L 73* 31   ALT U/L 13 15   ALK PHOS U/L 44 42*   TOTAL PROTEIN g/dL 5 1* 4 2*   ALBUMIN g/dL 2 4* 2 0*   TOTAL BILIRUBIN mg/dL 0 48 0 26         Results from last 7 days   Lab Units 06/23/21  0454 06/22/21  1602 06/22/21  0509 06/21/21  1640   GLUCOSE RANDOM mg/dL 116* 93 64* 120*           Results from last 7 days   Lab Units 06/23/21  0529   TROPONIN I ng/mL 2 97*         Results from last 7 days   Lab Units 06/22/21  1602   PROTIME seconds 19 7*  20 5*   INR  1 75*   PTT seconds 54*  64*           Results from last 7 days   Lab Units 06/22/21  1602   TOTAL COUNTED  100           Diet:   NPO    Mobility:   OOB as  tolerated    DVT Prophylaxis:    SCD'S    Medications/Pain Control:   Scheduled Medications:  cefepime, 2,000 mg, Intravenous, Q12H  heparin (porcine), 5,000 Units, Subcutaneous, Q8H ROLAND  metroNIDAZOLE, 500 mg, Intravenous, Q8H  nicotine, 14 mg, Transdermal, Daily  pantoprazole, 40 mg, Intravenous, Q24H [facility]  vancomycin, 12 5 mg/kg, Intravenous, Q12H  IV   Lasix  X1   6/23  IV  KCL  X 2  6/22      Continuous IV Infusions:  HYDROmorphone, , Intravenous, Continuous  IVF  75/hr  - d/c  6/23      PRN Meds:  albuterol, 2 5 mg, Nebulization, Q6H PRN  ondansetron, 4 mg, Intravenous, Q8H PRN    tele        Network Utilization Review Department  ATTENTION: Please call with any questions or concerns to [phone] and carefully listen to the prompts so that you are directed to the right person  All voicemails are confidential   [place] Tse all requests for admission clinical reviews, approved or denied determinations and any other requests to dedicated fax number below belonging to the campus where the patient is receiving treatment   List of dedicated fax numbers for the Facilities:  [address] DENIALS (Administrative/Medical Necessity) [phone]   [address] (Maternity/NICU/Pediatrics) [phone]   [address] [place] [address] [id number] [address] [facility] [place] [address] [phone]

## 2021-06-23 NOTE — CONSULTS
Consultation - Cardiology   Marlene Araiza 62 y o  female MRN: [id number]  Unit/Bed#: 7T [facility] 705-01 Encounter: [id number]    Physician Requesting Consult: Dinora Saldaña MD  Reason for Consult / Principal Problem:     Hypoxia  Consulting physician:  Rocael Nielson MD      Assessment/Plan     Assessment:  1  Postop sigmoid colon resection for diverticulitis  2  Sudden on set of hypoxia and respiratory failure with O2 sats in the 70s  3  Absent breath sounds in a significant portion of right base most likely from collapse of right lower lobe 4  COPD with long smoking history  5  Postop Anemia subsequently transfused, present hemoglobin 12 4  6  Non myocardial infarction troponin elevation secondary to hypoxia, last value 2 97  7  CT scan of the chest demonstrated no pulmonary embolism, emphysematous changes, small bilateral pleural effusions, collapse of right lower lobe with some extension into the bronchus to the right middle lobe most likely from mucus plugging  8  Doubt congestive heart failure    Plan:  1  At present time would not recommend diuresis  2  Consider bronchoscopy to clear mucus plugging   3  Echocardiogram  4  Portable chest x-ray    History of Present Illness   HPI: Danielle Cooper is a 62y o  year old female who presents with sudden onset of severe hypoxia with O2 saturations in the 70s and 80s following recovery from sigmoid colon resection from diverticulitis  No cardiac history  Long smoking history of 1 pack daily  No family history of cardiac disease  Hemoglobin if accurate found to be as low as 5 6 but following transfusion is now 12 4  No acute EKG changes    First troponin 2 97 most likely secondary to stress of hypoxia rather than myocardial ischemia/infarction    Patient Active Problem List    Diagnosis Date Noted    Acute respiratory failure with hypoxia ([facility] [address] ) 06/23/2021    Colocutaneous fistula 06/21/2021    Perforation of sigmoid colon due to diverticulitis 06/21/2021    Anxiety 06/19/2021    Hyperlipidemia 06/19/2021    Depression 04/12/2021    Smoking 04/12/2021       Historical Information   Past Medical History:   Diagnosis Date    Anxiety     Hyperlipidemia      Past Surgical History:   Procedure Laterality Date    FISTULA REPAIR Left     Lower abdomen    WI LAP,SURG,COLECTOMY, PARTIAL, W/ANAST N/A 6/21/2021    Procedure: RESECTION COLON SIGMOID LAPAROSCOPIC (attempted), open resection sigmoid colon, left salpingectomy;  Surgeon: Hans Roberts MD;  Location: [address] [place] [address];  Service: General     Past Surgical History:   Procedure Laterality Date    FISTULA REPAIR Left     Lower abdomen    WI LAP,SURG,COLECTOMY, PARTIAL, W/ANAST N/A 6/21/2021    Procedure: RESECTION COLON SIGMOID LAPAROSCOPIC (attempted), open resection sigmoid colon, left salpingectomy;  Surgeon: Hans Roberts MD;  Location: [address] [place] [address];  Service: General     Social History:  Social History     Substance and Sexual Activity   Alcohol Use Yes    Alcohol/week: 2 0 standard drinks    Types: 1 Cans of beer, 1 Shots of liquor per week     Social History     Substance and Sexual Activity   Drug Use Never     Social History     Tobacco Use   Smoking Status Current Every Day Smoker    Packs/day: 1 00    Types: Cigarettes   Smokeless Tobacco Never Used     Social History     Socioeconomic History    Marital status: /Civil Union     Spouse name: Not on file    Number of children: Not on file    Years of education: Not on file    Highest education level: Not on file   Occupational History    Not on file   Tobacco Use    Smoking status: Current Every Day Smoker     Packs/day: 1 00     Types: Cigarettes    Smokeless tobacco: Never Used   Vaping Use    Vaping Use: Never used   Substance and Sexual Activity    Alcohol use:  Yes     Alcohol/week: 2 0 standard drinks     Types: 1 Cans of beer, 1 Shots of liquor per week    Drug use: Never    Sexual activity: Not Currently     Partners: Male   Other Topics Concern    Not on file   Social History Narrative    Not on file     Social Determinants of Health     Financial Resource Strain:     Difficulty of Paying Living Expenses:    Food Insecurity:     Worried About Running Out of Food in the Last Year:     [address] in the Last Year:    Transportation Needs:     Lack of Transportation (Medical):  Lack of Transportation (Non-Medical):    Physical Activity:     Days of Exercise per Week:     Minutes of Exercise per Session:    Stress:     Feeling of Stress :    Social Connections:     Frequency of Communication with Friends and Family:     Frequency of Social Gatherings with Friends and Family:     Attends [employer] Services:     Active Member of Clubs or Organizations:     Attends Club or Organization Meetings:     Marital Status:    Intimate Partner Violence:     Fear of Current or Ex-Partner:     Emotionally Abused:     Physically Abused:     Sexually Abused:       Family History:   family history is not on file  Meds/Allergies   Current Facility-Administered Medications   Medication Dose Route Frequency    albuterol inhalation solution 2 5 mg  2 5 mg Nebulization Q6H PRN    cefepime (MAXIPIME) IVPB (premix in dextrose) 2,000 mg 50 mL  2,000 mg Intravenous Q12H    heparin (porcine) subcutaneous injection 5,000 Units  5,000 Units Subcutaneous Q8H [address]    HYDROmorphone (DILAUDID) 1 mg/mL 50 mL PCA   Intravenous Continuous    metroNIDAZOLE (FLAGYL) IVPB (premix) 500 mg 100 mL  500 mg Intravenous Q8H    nicotine (NICODERM CQ) 14 mg/24hr TD 24 hr patch 14 mg  14 mg Transdermal Daily    ondansetron (ZOFRAN) injection 4 mg  4 mg Intravenous Q8H PRN    pantoprazole (PROTONIX) injection 40 mg  40 mg Intravenous Q24H ROLAND    vancomycin (VANCOCIN) IVPB (premix in dextrose) 750 mg 150 mL  750 mg Intravenous Q12H     No Known Allergies      Review of Systems   Constitutional: Negative  HENT: Negative      Respiratory: Positive for shortness of breath  Negative for chest tightness  Cardiovascular: Negative for chest pain and leg swelling  Gastrointestinal: Negative  Endocrine: Negative  Genitourinary: Negative  Musculoskeletal: Negative  Skin: Negative  Allergic/Immunologic: Negative  Neurological: Negative  Hematological: Negative  Psychiatric/Behavioral: Negative  Objective   Vitals: Blood pressure 90/62, pulse 100, temperature (!) 97 1 °F (36 2 °C), temperature source Temporal, resp  rate (!) 25, height 5' 3" (1 6 m), weight 44 9 kg (99 lb), SpO2 (!) 88 %  Orthostatic Blood Pressures      Most Recent Value   Blood Pressure  90/62 filed at 06/23/2021 4456   Patient Position - Orthostatic VS  Lying filed at 06/23/2021 4043          Intake/Output Summary (Last 24 hours) at 6/23/2021 0919  Last data filed at 6/23/2021 0700  Gross per 24 hour   Intake 1610 19 ml   Output 2375 ml   Net -764 81 ml     Invasive Devices     Peripheral Intravenous Line            Peripheral IV 06/21/21 Left Hand 2 days    Peripheral IV 06/21/21 Right;Ventral (anterior) Forearm 1 day    Peripheral IV 06/23/21 Left Antecubital <1 day          Drain            Urethral Catheter Latex 16 Fr  1 day                Physical Exam  Constitutional:       General: She is not in acute distress  Appearance: She is well-developed  She is ill-appearing  HENT:      Head: Normocephalic and atraumatic  Neck:      Thyroid: No thyromegaly  Vascular: No carotid bruit or JVD  Trachea: No tracheal deviation  Cardiovascular:      Rate and Rhythm: Normal rate and regular rhythm  Pulses: Normal pulses  Heart sounds: Normal heart sounds  No murmur heard  No friction rub  No gallop  Pulmonary:      Effort: Respiratory distress present  Breath sounds: No wheezing, rhonchi or rales  Comments: Dullness and absent breath sounds 1/2 way up on on right  Chest:      Chest wall: No tenderness     Abdominal: Palpations: Abdomen is soft  Musculoskeletal:         General: Normal range of motion  Cervical back: Normal range of motion and neck supple  Right lower leg: No edema  Left lower leg: No edema  Skin:     General: Skin is warm and dry  Neurological:      General: No focal deficit present  Mental Status: She is alert and oriented to person, place, and time  Gait: Gait normal    Psychiatric:         Mood and Affect: Mood normal          Behavior: Behavior normal          Thought Content: Thought content normal          Judgment: Judgment normal          ======================================================    Lab Results   Component Value Date    WBC 8 80 06/23/2021    HGB 12 4 06/23/2021    HCT 36 3 06/23/2021     (H) 06/23/2021     06/23/2021      Lab Results   Component Value Date    SODIUM 131 (L) 06/23/2021    K 3 6 06/23/2021     06/23/2021    CO2 20 (L) 06/23/2021    BUN 15 06/23/2021    CREATININE 0 74 06/23/2021    GLUC 116 (H) 06/23/2021    CALCIUM 8 3 (L) 06/23/2021        Lab Results   Component Value Date    INR 1 75 (H) 06/22/2021    PROTIME 19 7 (H) 06/22/2021    PROTIME 20 5 (H) 06/22/2021        Imaging:   I have personally reviewed pertinent reports  EKG:  Sinus tachycardia otherwise normal tracing  Portions of the record may have been created with voice recognition software  Occasional wrong word or "sound a like" substitutions may have occurred due to the inherent limitations of voice recognition software  Read the chart carefully and recognize, using context, where substitutions have occurred

## 2021-06-23 NOTE — NURSING NOTE
Up out of bed  Ambulated with minimal assistance  Needed assistance with IV tubing, griffin and PCA  Assisted with incentive spirometer and flutter valve as well as pulmonary percussion  Sitting up straight  Oxygen saturation is at 98% on 8 L per midflow n/c  Will continue to monitor

## 2021-06-23 NOTE — ASSESSMENT & PLAN NOTE
Patient clinically worsening with fatigue from work of breathing and Currently saturating at 94% on BIPAP at 45% at 8/5   - plan for transfer to critical care at [facility]  - Case discussed with critical care attending who recommends additional dose of Lasix IV 20 mg and IV Solu-Medrol 125 mg, DC IV fluids and accepted case on as live 1 step down  -PTA for transfer 3:30 a m  06/23/21: Patient in acute respiratory failure with saturation in the low 80s on post-op Day 2 (s/p sigmoid colon resection d/t diverticulitis complicated by abscess)  Likely 2/2 pneumonia vs atelectasis vs pleural effusion or any combination of the aforementioned  CT PE: collapsed right lower lobe, mucus plug at the RLL bronchus, small bilateral pleural effusion, bilateral emphysematous changes, ground-glass opacities/patches at the left lung, and RUL  No evidence of pulmonary embolism  Patient was intiated on emperic abx coverage with Vancomycin 500 mg Q12H, Cefepime 2 g Q12H for suspected pneumonia  Critical care specialist and RT on board  - Consult to ID, recommendations appreciated  Plan is to continue Vancomycin and Cefipime  And d/c Flagyl 500 mg as per IDSA guidelines for pneumonia  - Intention was to have bronchoscopy for mucus plug removal if patient unimproved within next 48hrs  - Follow-up blood and sputum culture      - Continue respiratory protocol  - Repeat CBC, CMP, procal in AM

## 2021-06-23 NOTE — NURSING NOTE
RN noticed patient O2 stat drop down to 61% on 4L  At this time the supervisor was on the floor and came into patients room with RN and assessed patient  Patient has SOB and feels she can't move because she having trouble breathing  Her lungs are coarse and she is slightly wheezing  RN contacted Dr Markus Weinberg at this time  Dr Markus Weinberg placed verbal orders for IV lasix and respiratory protocol  RN contacted respiratory and they came and seen the patient  Patient received a treatment and was placed on mid flow 8L  Call bell in reach

## 2021-06-24 ENCOUNTER — APPOINTMENT (INPATIENT)
Dept: RADIOLOGY | Facility: HOSPITAL | Age: 61
DRG: 329 | End: 2021-06-24
Payer: COMMERCIAL

## 2021-06-24 PROBLEM — F17.200 NICOTINE DEPENDENCE: Status: ACTIVE | Noted: 2021-06-24

## 2021-06-24 PROBLEM — N17.9 AKI (ACUTE KIDNEY INJURY) (HCC): Status: ACTIVE | Noted: 2021-06-24

## 2021-06-24 PROBLEM — E87.8 ELECTROLYTE ABNORMALITY: Status: ACTIVE | Noted: 2021-06-24

## 2021-06-24 PROBLEM — K57.92 DIVERTICULITIS: Status: ACTIVE | Noted: 2021-06-24

## 2021-06-24 PROBLEM — E87.6 HYPOKALEMIA: Status: ACTIVE | Noted: 2021-06-24

## 2021-06-24 LAB
ANION GAP SERPL CALCULATED.3IONS-SCNC: 8 MMOL/L (ref 5–14)
ANISOCYTOSIS BLD QL SMEAR: PRESENT
ATRIAL RATE: 100 BPM
ATRIAL RATE: 101 BPM
ATRIAL RATE: 93 BPM
ATRIAL RATE: 95 BPM
ATRIAL RATE: 96 BPM
BUN SERPL-MCNC: 21 MG/DL (ref 5–25)
BURR CELLS BLD QL SMEAR: PRESENT
CALCIUM SERPL-MCNC: 8.4 MG/DL (ref 8.4–10.2)
CHLORIDE SERPL-SCNC: 105 MMOL/L (ref 97–108)
CO2 SERPL-SCNC: 24 MMOL/L (ref 22–30)
CREAT SERPL-MCNC: 0.78 MG/DL (ref 0.6–1.2)
EOSINOPHIL # BLD AUTO: 0.08 THOUSAND/UL (ref 0–0.4)
EOSINOPHIL NFR BLD MANUAL: 1 % (ref 0–6)
ERYTHROCYTE [DISTWIDTH] IN BLOOD BY AUTOMATED COUNT: 21.6 %
GFR SERPL CREATININE-BSD FRML MDRD: 83 ML/MIN/1.73SQ M
GLUCOSE SERPL-MCNC: 103 MG/DL (ref 70–99)
HCT VFR BLD AUTO: 34.3 % (ref 36–46)
HGB BLD-MCNC: 12 G/DL (ref 12–16)
LYMPHOCYTES # BLD AUTO: 0.25 THOUSAND/UL (ref 0.5–4)
LYMPHOCYTES # BLD AUTO: 3 % (ref 25–45)
MACROCYTES BLD QL AUTO: PRESENT
MCH RBC QN AUTO: 34.9 PG (ref 26–34)
MCHC RBC AUTO-ENTMCNC: 35 G/DL (ref 31–36)
MCV RBC AUTO: 100 FL (ref 80–100)
MONOCYTES # BLD AUTO: 0.08 THOUSAND/UL (ref 0.2–0.9)
MONOCYTES NFR BLD AUTO: 1 % (ref 1–10)
NEUTS BAND NFR BLD MANUAL: 3 % (ref 0–8)
NEUTS SEG # BLD: 7.79 THOUSAND/UL (ref 1.8–7.8)
NEUTS SEG NFR BLD AUTO: 92 %
P AXIS: 29 DEGREES
P AXIS: 32 DEGREES
P AXIS: 35 DEGREES
P AXIS: 60 DEGREES
PLATELET # BLD AUTO: 208 THOUSANDS/UL (ref 150–450)
PLATELET BLD QL SMEAR: ADEQUATE
PMV BLD AUTO: 7.3 FL (ref 8.9–12.7)
POTASSIUM SERPL-SCNC: 3.2 MMOL/L (ref 3.6–5)
PR INTERVAL: 126 MS
PR INTERVAL: 134 MS
PR INTERVAL: 136 MS
PR INTERVAL: 142 MS
QRS AXIS: 52 DEGREES
QRS AXIS: 53 DEGREES
QRS AXIS: 57 DEGREES
QRS AXIS: 57 DEGREES
QRS AXIS: 65 DEGREES
QRSD INTERVAL: 66 MS
QRSD INTERVAL: 70 MS
QRSD INTERVAL: 70 MS
QRSD INTERVAL: 72 MS
QRSD INTERVAL: 74 MS
QT INTERVAL: 354 MS
QT INTERVAL: 358 MS
QT INTERVAL: 380 MS
QT INTERVAL: 382 MS
QT INTERVAL: 386 MS
QTC INTERVAL: 459 MS
QTC INTERVAL: 461 MS
QTC INTERVAL: 474 MS
QTC INTERVAL: 475 MS
QTC INTERVAL: 485 MS
RBC # BLD AUTO: 3.43 MILLION/UL (ref 4–5.2)
RBC MORPH BLD: ABNORMAL
SARS-COV-2 RNA RESP QL NAA+PROBE: NEGATIVE
SODIUM SERPL-SCNC: 137 MMOL/L (ref 137–147)
T WAVE AXIS: 11 DEGREES
T WAVE AXIS: 13 DEGREES
T WAVE AXIS: 50 DEGREES
T WAVE AXIS: 59 DEGREES
T WAVE AXIS: 82 DEGREES
TOTAL CELLS COUNTED SPEC: 100
VENTRICULAR RATE: 100 BPM
VENTRICULAR RATE: 101 BPM
VENTRICULAR RATE: 93 BPM
VENTRICULAR RATE: 94 BPM
VENTRICULAR RATE: 95 BPM
WBC # BLD AUTO: 8.2 THOUSAND/UL (ref 4.5–11)

## 2021-06-24 PROCEDURE — 94669 MECHANICAL CHEST WALL OSCILL: CPT

## 2021-06-24 PROCEDURE — 93306 TTE W/DOPPLER COMPLETE: CPT | Performed by: INTERNAL MEDICINE

## 2021-06-24 PROCEDURE — 87081 CULTURE SCREEN ONLY: CPT | Performed by: INTERNAL MEDICINE

## 2021-06-24 PROCEDURE — 99255 IP/OBS CONSLTJ NEW/EST HI 80: CPT | Performed by: INTERNAL MEDICINE

## 2021-06-24 PROCEDURE — 94640 AIRWAY INHALATION TREATMENT: CPT

## 2021-06-24 PROCEDURE — 71045 X-RAY EXAM CHEST 1 VIEW: CPT

## 2021-06-24 PROCEDURE — 94760 N-INVAS EAR/PLS OXIMETRY 1: CPT

## 2021-06-24 PROCEDURE — 94668 MNPJ CHEST WALL SBSQ: CPT

## 2021-06-24 PROCEDURE — 99232 SBSQ HOSP IP/OBS MODERATE 35: CPT | Performed by: FAMILY MEDICINE

## 2021-06-24 PROCEDURE — C9113 INJ PANTOPRAZOLE SODIUM, VIA: HCPCS | Performed by: SPECIALIST

## 2021-06-24 PROCEDURE — U0005 INFEC AGEN DETEC AMPLI PROBE: HCPCS | Performed by: FAMILY MEDICINE

## 2021-06-24 PROCEDURE — 85007 BL SMEAR W/DIFF WBC COUNT: CPT | Performed by: SPECIALIST

## 2021-06-24 PROCEDURE — 93010 ELECTROCARDIOGRAM REPORT: CPT | Performed by: INTERNAL MEDICINE

## 2021-06-24 PROCEDURE — 97163 PT EVAL HIGH COMPLEX 45 MIN: CPT

## 2021-06-24 PROCEDURE — 99232 SBSQ HOSP IP/OBS MODERATE 35: CPT | Performed by: NURSE PRACTITIONER

## 2021-06-24 PROCEDURE — 99024 POSTOP FOLLOW-UP VISIT: CPT | Performed by: SPECIALIST

## 2021-06-24 PROCEDURE — 80048 BASIC METABOLIC PNL TOTAL CA: CPT | Performed by: SPECIALIST

## 2021-06-24 PROCEDURE — 85027 COMPLETE CBC AUTOMATED: CPT | Performed by: SPECIALIST

## 2021-06-24 PROCEDURE — 99232 SBSQ HOSP IP/OBS MODERATE 35: CPT | Performed by: INTERNAL MEDICINE

## 2021-06-24 PROCEDURE — U0003 INFECTIOUS AGENT DETECTION BY NUCLEIC ACID (DNA OR RNA); SEVERE ACUTE RESPIRATORY SYNDROME CORONAVIRUS 2 (SARS-COV-2) (CORONAVIRUS DISEASE [COVID-19]), AMPLIFIED PROBE TECHNIQUE, MAKING USE OF HIGH THROUGHPUT TECHNOLOGIES AS DESCRIBED BY CMS-2020-01-R: HCPCS | Performed by: FAMILY MEDICINE

## 2021-06-24 RX ORDER — POTASSIUM CHLORIDE, DEXTROSE MONOHYDRATE AND SODIUM CHLORIDE 300; 5; 900 MG/100ML; G/100ML; MG/100ML
75 INJECTION, SOLUTION INTRAVENOUS CONTINUOUS
Status: DISCONTINUED | OUTPATIENT
Start: 2021-06-24 | End: 2021-06-24 | Stop reason: SDUPTHER

## 2021-06-24 RX ORDER — MAGNESIUM SULFATE HEPTAHYDRATE 40 MG/ML
2 INJECTION, SOLUTION INTRAVENOUS ONCE
Status: COMPLETED | OUTPATIENT
Start: 2021-06-24 | End: 2021-06-24

## 2021-06-24 RX ORDER — OXYCODONE HYDROCHLORIDE AND ACETAMINOPHEN 5; 325 MG/1; MG/1
1 TABLET ORAL EVERY 4 HOURS PRN
Status: DISCONTINUED | OUTPATIENT
Start: 2021-06-24 | End: 2021-06-25 | Stop reason: HOSPADM

## 2021-06-24 RX ORDER — FUROSEMIDE 10 MG/ML
20 INJECTION INTRAMUSCULAR; INTRAVENOUS ONCE
Status: COMPLETED | OUTPATIENT
Start: 2021-06-24 | End: 2021-06-24

## 2021-06-24 RX ORDER — ALBUTEROL SULFATE 2.5 MG/3ML
2.5 SOLUTION RESPIRATORY (INHALATION)
Status: DISCONTINUED | OUTPATIENT
Start: 2021-06-24 | End: 2021-06-25 | Stop reason: HOSPADM

## 2021-06-24 RX ORDER — POTASSIUM CHLORIDE 14.9 MG/ML
20 INJECTION INTRAVENOUS ONCE
Status: COMPLETED | OUTPATIENT
Start: 2021-06-24 | End: 2021-06-24

## 2021-06-24 RX ORDER — DEXTROSE, SODIUM CHLORIDE, AND POTASSIUM CHLORIDE 5; .45; .3 G/100ML; G/100ML; G/100ML
75 INJECTION INTRAVENOUS CONTINUOUS
Status: DISCONTINUED | OUTPATIENT
Start: 2021-06-24 | End: 2021-06-24

## 2021-06-24 RX ORDER — OXYCODONE HYDROCHLORIDE AND ACETAMINOPHEN 5; 325 MG/1; MG/1
2 TABLET ORAL EVERY 4 HOURS PRN
Status: DISCONTINUED | OUTPATIENT
Start: 2021-06-24 | End: 2021-06-25 | Stop reason: HOSPADM

## 2021-06-24 RX ADMIN — ALBUTEROL SULFATE 2.5 MG: 2.5 SOLUTION RESPIRATORY (INHALATION) at 13:44

## 2021-06-24 RX ADMIN — SODIUM CHLORIDE SOLN NEBU 3% 4 ML: 3 NEBU SOLN at 19:55

## 2021-06-24 RX ADMIN — ALBUTEROL SULFATE 2.5 MG: 2.5 SOLUTION RESPIRATORY (INHALATION) at 19:55

## 2021-06-24 RX ADMIN — IPRATROPIUM BROMIDE AND ALBUTEROL SULFATE 3 ML: 2.5; .5 SOLUTION RESPIRATORY (INHALATION) at 00:25

## 2021-06-24 RX ADMIN — HEPARIN SODIUM 5000 UNITS: 5000 INJECTION INTRAVENOUS; SUBCUTANEOUS at 05:13

## 2021-06-24 RX ADMIN — VANCOMYCIN HYDROCHLORIDE 500 MG: 500 INJECTION, SOLUTION INTRAVENOUS at 11:35

## 2021-06-24 RX ADMIN — SODIUM CHLORIDE SOLN NEBU 3% 4 ML: 3 NEBU SOLN at 00:26

## 2021-06-24 RX ADMIN — POTASSIUM CHLORIDE 20 MEQ: 14.9 INJECTION, SOLUTION INTRAVENOUS at 18:04

## 2021-06-24 RX ADMIN — IPRATROPIUM BROMIDE AND ALBUTEROL SULFATE 3 ML: 2.5; .5 SOLUTION RESPIRATORY (INHALATION) at 07:52

## 2021-06-24 RX ADMIN — CEFEPIME HYDROCHLORIDE 2000 MG: 2 INJECTION, SOLUTION INTRAVENOUS at 22:39

## 2021-06-24 RX ADMIN — PANTOPRAZOLE SODIUM 40 MG: 40 INJECTION, POWDER, FOR SOLUTION INTRAVENOUS at 09:11

## 2021-06-24 RX ADMIN — OXYCODONE HYDROCHLORIDE AND ACETAMINOPHEN 2 TABLET: 5; 325 TABLET ORAL at 17:22

## 2021-06-24 RX ADMIN — CEFEPIME HYDROCHLORIDE 2000 MG: 2 INJECTION, SOLUTION INTRAVENOUS at 09:17

## 2021-06-24 RX ADMIN — KETOROLAC TROMETHAMINE 15 MG: 30 INJECTION, SOLUTION INTRAMUSCULAR; INTRAVENOUS at 05:13

## 2021-06-24 RX ADMIN — NICOTINE 14 MG: 14 PATCH, EXTENDED RELEASE TRANSDERMAL at 09:11

## 2021-06-24 RX ADMIN — VANCOMYCIN HYDROCHLORIDE 500 MG: 500 INJECTION, SOLUTION INTRAVENOUS at 22:55

## 2021-06-24 RX ADMIN — HEPARIN SODIUM 5000 UNITS: 5000 INJECTION INTRAVENOUS; SUBCUTANEOUS at 22:39

## 2021-06-24 RX ADMIN — POTASSIUM CHLORIDE 20 MEQ: 14.9 INJECTION, SOLUTION INTRAVENOUS at 16:13

## 2021-06-24 RX ADMIN — HEPARIN SODIUM 5000 UNITS: 5000 INJECTION INTRAVENOUS; SUBCUTANEOUS at 13:12

## 2021-06-24 RX ADMIN — OXYCODONE HYDROCHLORIDE AND ACETAMINOPHEN 2 TABLET: 5; 325 TABLET ORAL at 22:39

## 2021-06-24 RX ADMIN — METRONIDAZOLE 500 MG: 500 SOLUTION INTRAVENOUS at 05:00

## 2021-06-24 RX ADMIN — SODIUM CHLORIDE SOLN NEBU 3% 4 ML: 3 NEBU SOLN at 13:45

## 2021-06-24 RX ADMIN — MAGNESIUM SULFATE IN WATER 2 G: 40 INJECTION, SOLUTION INTRAVENOUS at 09:57

## 2021-06-24 RX ADMIN — POTASSIUM CHLORIDE: 149 INJECTION, SOLUTION, CONCENTRATE INTRAVENOUS at 09:57

## 2021-06-24 RX ADMIN — FUROSEMIDE 20 MG: 10 INJECTION, SOLUTION INTRAVENOUS at 22:52

## 2021-06-24 RX ADMIN — DEXTROSE, SODIUM CHLORIDE, AND POTASSIUM CHLORIDE 75 ML/HR: 5; .9; .15 INJECTION INTRAVENOUS at 07:43

## 2021-06-24 RX ADMIN — SODIUM CHLORIDE SOLN NEBU 3% 4 ML: 3 NEBU SOLN at 07:52

## 2021-06-24 NOTE — ASSESSMENT & PLAN NOTE
Post op Day 4  Surgical wound with no signs of infection at this time      - Continue management as per Primary Surgical Team  - Patient will need close surgical follow-up at transfer facility - Dr Branden Gasca informed of plan for transfer

## 2021-06-24 NOTE — PROGRESS NOTES
Progress Note - Pulmonary   Marlene Lindsay 62 y o  female MRN: [id number]  Unit/Bed#: 7T [facility] 705-01 Encounter: [id number]    Assessment/Plan:    1  Acute hypoxic respiratory failure likely secondary to multifaceted as listed below       -  currently on 11 L-90%, patient does not wear home O2       -  continue saturations greater than 89%       -  pulmonary toileting:  Deep breathing cough, OOB as tolerated, IS Q 1 hr       -  will need ambulatory pulse ox prior to discharge, will likely need home O2    2  B/L PNA       -  bilateral lower lobe opacity possible component of aspiration        -  Day #2 cefepime/vancomycin,  will add Flagyl       -  procalcitonin pending, WBC unremarkable    3  RLL collapse/mucus plugging       -  patient appears to now be expectorating sputum       -  continue respiratory clearance: IS, flutter, chest PT, Xopenex/3% saline q 6, Mucinex       -  Demonstrated IS - 500 mL       -  pulmonary vest unavailable       -  Increased OOB chair    4  Possible volume overload secondary to volume resuscitation       -  S/p - 3 UPRBC       -  patient received 2 does IV Lasix 20 mg       -  patient does not appear overtly overloaded or sound wet       -  consider repeat diuretics tomorrow    5  Suspected COPD without acute exacerbation in the setting of active tobacco abuse       -  patient at this time does not seem to require steroids       -  continue nebulizers as listed above       -  patient will need close pulmonary follow-up and formal PFTs       -  would likely benefit from discharged on LAMA vs LABA/LAMA, and albuterol MDI/nebulizer    6  Colocutaneous fistula s/p sigmoid colon resection       -  POD # 3       -  general surgery following    7   Active tobacco abuse        -  encourage in educated on tobacco cessation        -  NRT per primary team        -  patient appears in pre contemplation stage        Chief Complaint:    "I am starting to feel better"    Subjective:    Yovani Araiza was comfortably sitting in her bed  She reports that she is somewhat fatigued  Patient reports she is beginning to expectorate some sputum  No significant overnight events reported  Patient currently denying any fevers, chills, hemoptysis, headaches, night sweats, pleuritic chest pain, or palpitations  Objective:    Vitals: Blood pressure 120/77, pulse 89, temperature (!) 96 3 °F (35 7 °C), temperature source Temporal, resp  rate 20, height 5' 3" (1 6 m), weight 44 9 kg (99 lb), SpO2 94 %  11L,Body mass index is 17 54 kg/m²  Intake/Output Summary (Last 24 hours) at 6/24/2021 0934  Last data filed at 6/24/2021 0700  Gross per 24 hour   Intake 253 8 ml   Output 640 ml   Net -386 2 ml       Invasive Devices     Peripheral Intravenous Line            Peripheral IV 06/21/21 Right;Ventral (anterior) Forearm 2 days    Peripheral IV 06/23/21 Left Antecubital 1 day          Drain            Urethral Catheter Latex 16 Fr  2 days                Physical Exam:   Physical Exam  Constitutional:       General: She is not in acute distress  Appearance: Normal appearance  She is normal weight  She is ill-appearing  HENT:      Head: Normocephalic and atraumatic  Nose: Nose normal  No congestion or rhinorrhea  Mouth/Throat:      Mouth: Mucous membranes are dry  Pharynx: No oropharyngeal exudate or posterior oropharyngeal erythema  Cardiovascular:      Rate and Rhythm: Normal rate and regular rhythm  Pulses: Normal pulses  Heart sounds: Normal heart sounds  No murmur heard  No friction rub  No gallop  Pulmonary:      Effort: Pulmonary effort is normal  No respiratory distress  Breath sounds: No stridor  No wheezing, rhonchi or rales  Comments: Significantly diminished aeration  Chest:      Chest wall: No tenderness  Abdominal:      General: Abdomen is flat  Bowel sounds are normal  There is no distension  Palpations: Abdomen is soft  There is no mass     Musculoskeletal: General: No swelling or tenderness  Normal range of motion  Cervical back: Normal range of motion and neck supple  No rigidity or tenderness  Skin:     General: Skin is warm and dry  Coloration: Skin is not jaundiced or pale  Neurological:      General: No focal deficit present  Mental Status: She is alert and oriented to person, place, and time  Mental status is at baseline  Psychiatric:         Mood and Affect: Mood normal          Behavior: Behavior normal          Labs:    I have personally reviewed pertinent lab results CBC:   Lab Results   Component Value Date    WBC 8 20 06/24/2021    HGB 12 0 06/24/2021    HCT 34 3 (L) 06/24/2021     06/24/2021     06/24/2021    MCH 34 9 (H) 06/24/2021    MCHC 35 0 06/24/2021    RDW 21 6 (H) 06/24/2021    MPV 7 3 (L) 06/24/2021   , CMP:   Lab Results   Component Value Date    SODIUM 137 06/24/2021    K 3 2 (L) 06/24/2021     06/24/2021    CO2 24 06/24/2021    BUN 21 06/24/2021    CREATININE 0 78 06/24/2021    CALCIUM 8 4 06/24/2021    EGFR 83 06/24/2021   , PT/INR: No results found for: PT, INR, Troponin:   Lab Results   Component Value Date    TROPONINI 2 41 (H) 06/23/2021       Imaging and other studies: I have personally reviewed pertinent films in PACS     Chest x-ray- 6/24/2021- extensive bilateral lower lobe consolidation

## 2021-06-24 NOTE — CONSULTS
Consultation - Infectious Disease   Marlene VINCE Holliday 62 y o  female MRN: [id number]  Unit/Bed#: 7T [facility] 705-01 Encounter: [id number]      IMPRESSION & RECOMMENDATIONS:     52-year-old female patient with acute hypoxic respiratory failure noted on postop day 2 for  open resection of sigmoid colon, left salpingectomy  1- Acute hypoxic respiratory failure:  Developed on postop day 2; now on mid flow nasal cannula 13 liters/minute  Patient is chronic smoker, now with elevated troponin  CT chest x-ray suggesting mucus plugging with possible atelectasis or pneumonia  - suspect respiratory failure multifactorial   Concern for pulmonary edema in setting of elevated troponin  Concern for mucous plugging with subsequent atelectasis  Concern for pneumonia  - continue vancomycin and cefepime for now  - pharmacy follow-up for vancomycin dosing  - follow-up result of sputum culture sent 06/23/2021  Send MRSA screen  - repeat CBC with differential, CMP, procalcitonin in a m   - close pulmonary follow-up  Encouraged patient to continue using spirometer and flutter valve  2- suspected pneumonia:  Possibly participating in problem 1  Patient likely with limited inspiratory effort due to her abdominal pain and recent surgery, causing mucus plugging, atelectasis, and possible associated pneumonia  - antibiotics as above  - will deescalate over the next 24-48 hours based on sputum culture results and results of MRSA screen  - if no noted improvement over the next 24-48 hours, patient will need bronchoscopy to remove mucus plugging  3- complicated diverticulitis:  With recurrent abscess and colocutaneous fistula  Now postop day 2 for open resection of sigmoid colon, left salpingectomy  Operative report reviewed, extensive phlegmonous changes and adhesions noted  Surgical wound with no signs of infection at this time    - frequent abdominal exams  - pain control as per primary service team and General surgery  - close general surgery follow-up    4- suspected COPD:  Patient with chronic smoking  Pulmonary evaluation reviewed, currently off steroids  - consider avoiding systemic corticosteroid treatment in setting of recent extensive surgical procedure as mentioned above  - close pulmonary team follow-up    5- nicotine dependence:  Stressed need for complete smoking cessation after hospital discharge  Have discussed the above management plan in detail with the primary service  Plan mentioned above discussed with patient  Extensive review of the medical records in epic including review of the notes, radiographs, and laboratory results     HISTORY OF PRESENT ILLNESS:  Reason for Consult:  Acute hypoxic respiratory failure, pneumonia  HPI: Joni Chavez is a 62y o  year old female, with history of diverticulitis complicated by fistula  Her sigmoid diverticulitis started 10/9770, complicated by intra-abdominal abscess requiring percutaneous drainage, then recurrence, and formation of fistula  After removal of percutaneous drain, leakage persisted from the drain exit site  Patient now admitted on 06/21/2021 and underwent on the same day open resection of sigmoid colon, left salpingectomy  Postoperative course complicated by acute hypoxic respiratory failure, requiring mid flow nasal cannula 15 liters/minute  Symptoms started on 06/23/2021  At that time patient was evaluated by pulmonary service team   Chest x-ray and CT chest showed extensive bilateral consolidation, ground-glass opacities, and concern for atelectasis versus pneumonia and mucus plugging  Patient was started on cefepime, vancomycin, Flagyl  Now she remains on nasal cannula 13 liters/minute  She remains afebrile with normal WBC count  ID service consulted to assist in management of acute hypoxic respiratory failure and possible pneumonia      Upon my evaluation today, and despite persistent hypoxic respiratory failure, patient reports feeling better, with decreased shortness of breath  She reports mild cough with small amount of clear phlegm production  She denies chest pain  Mild diffuse postoperative abdominal pain is also reported  Denies nausea or vomiting  Denies fever or chills  REVIEW OF SYSTEMS:  A complete review of systems is negative other than that noted in the HPI  PAST MEDICAL HISTORY:  Past Medical History:   Diagnosis Date    Anxiety     Hyperlipidemia      Past Surgical History:   Procedure Laterality Date    FISTULA REPAIR Left     Lower abdomen    MS LAP,SURG,COLECTOMY, PARTIAL, W/ANAST N/A 2021    Procedure: RESECTION COLON SIGMOID LAPAROSCOPIC (attempted), open resection sigmoid colon, left salpingectomy;  Surgeon: Riki Cervantes MD;  Location: [address] [place] [address];  Service: General       FAMILY HISTORY:  Non-contributory    SOCIAL HISTORY:  Social History   Social History     Substance and Sexual Activity   Alcohol Use Yes    Alcohol/week: 2 0 standard drinks    Types: 1 Cans of beer, 1 Shots of liquor per week     Social History     Substance and Sexual Activity   Drug Use Never     Social History     Tobacco Use   Smoking Status Current Every Day Smoker    Packs/day: 1 00    Types: Cigarettes   Smokeless Tobacco Never Used       ALLERGIES:  No Known Allergies    MEDICATIONS:  All current active medications have been reviewed        PHYSICAL EXAM:  Temp:  [96 3 °F (35 7 °C)-97 3 °F (36 3 °C)] 96 3 °F (35 7 °C)  HR:  [89-93] 89  Resp:  [18-20] 20  BP: ()/(55-78) 120/77  SpO2:  [78 %-94 %] 94 %  Temp (24hrs), Av 9 °F (36 1 °C), Min:96 3 °F (35 7 °C), Max:97 3 °F (36 3 °C)  Current: Temperature: (!) 96 3 °F (35 7 °C)    Intake/Output Summary (Last 24 hours) at 2021 1107  Last data filed at 2021 0900  Gross per 24 hour   Intake 493 8 ml   Output 640 ml   Net -146 2 ml       General Appearance:  AAO x3, appears tired, chronically ill, but no signs of acute distress   Head:  Normocephalic, without obvious abnormality, atraumatic   Eyes:  Conjunctiva pink and sclera anicteric, both eyes   Nose: Nares normal, mucosa normal, no drainage   Throat: Oropharynx moist without lesions   Neck: Supple, symmetrical, no adenopathy, no tenderness/mass/nodules   Back:   Symmetric, no curvature, ROM normal, no CVA tenderness   Lungs:   Decreased air entry at bases bilaterally, fine crackles over left mid lung field  Chest Wall:  No tenderness or deformity   Heart:  RRR; no murmur, rub or gallop   Abdomen:   Soft, mild diffuse tenderness, midline incision wound with no dehiscence, no drainage  No abdominal wall erythema   Extremities: No cyanosis, clubbing or edema   Skin: No rashes or lesions  No draining wounds noted  Lymph nodes: Cervical, supraclavicular nodes normal   Neurologic: Alert and oriented times 3, extremity strength 5/5 and symmetric       LABS, IMAGING, & OTHER STUDIES:  Lab Results:  I have personally reviewed pertinent labs  Results from last 7 days   Lab Units 06/24/21  0532 06/23/21  0454 06/22/21  1602   WBC Thousand/uL 8 20 8 80 8 20   HEMOGLOBIN g/dL 12 0 12 4 10 2*   PLATELETS Thousands/uL 208 182 176     Results from last 7 days   Lab Units 06/24/21  0532 06/23/21  0454 06/22/21  1602 06/21/21  1640   SODIUM mmol/L 137 131* 134* 135*   POTASSIUM mmol/L 3 2* 3 6 3 5* 2 9*   CHLORIDE mmol/L 105 104 106 105   CO2 mmol/L 24 20* 24 24   BUN mg/dL 21 15 15 11   CREATININE mg/dL 0 78 0 74 0 73 0 59*   EGFR ml/min/1 73sq m 83 88 90 100   CALCIUM mg/dL 8 4 8 3* 8 5 7 8*   AST U/L  --  73*  --  31   ALT U/L  --  13  --  15   ALK PHOS U/L  --  44  --  42*     Results from last 7 days   Lab Units 06/23/21  1609 06/23/21  0832 06/23/21  0831   BLOOD CULTURE   --  Received in Microbiology Lab  Culture in Progress  Received in Microbiology Lab  Culture in Progress     GRAM STAIN RESULT  1+ Epithelial cells per low power field*  No polys seen*  Rare Gram negative rods*  Rare Gram positive rods*  -- --                        Imaging Studies:   I have personally reviewed pertinent imaging study reports and images in PACS  X-ray with extensive bilateral consolidation and ground-glass opacity  CT chest no PE, small bilateral pleural effusions, suspected mucus plugging in right middle and right lower lobe bronchi  Patchy alveolar and ground-glass opacity in right upper lobe, left upper and lower lobes  Consolidation in bilateral lower lobes and right middle lobe  Other Studies:   I have personally reviewed pertinent reports    No previous relevant microbiological data upon review of epic and Care everywhere chart

## 2021-06-24 NOTE — PROGRESS NOTES
Progress Note    Marlene Kruger Patch 62 y o  female MRN: [id number]  Unit/Bed#: 7T [facility] 705-01 Encounter: [id number]  Admitting Physician: Madiha Perez MD  PCP: Lianne Gottron, DO  Date of Admission:  6/21/2021  7:20 AM    Assessment and Plan    * Acute respiratory failure with hypoxia [facility])  Assessment & Plan  06/23/21: Patient in acute respiratory failure with saturation in the low 80s on post-op Day 2 (s/p sigmoid colon resection d/t diverticulitis complicated by abscess)  Likely 2/2 pneumonia vs atelectasis vs pleural effusion or any combination of the aforementioned  Currently saturating at 94% on NC O2 at13L/min (O2 requirement has ranged between 8-15 L)  PE: Tachycardia, tachypnea and hypotension now improved although patient desaturates to mid 80s on exertion  No signs of fluid overload  CT PE: collapsed right lower lobe, mucus plug at the RLL bronchus, small bilateral pleural effusion, bilateral emphysematous changes, ground-glass opacities/patches at the left lung, and RUL  No evidence of pulmonary embolism  Patient was intiated on emperic abx coverage with Vancomycin 500 mg Q12H, Cefepime 2 g Q12H, and Flagyl 500 mg Q8H for suspected pneumonia  Critical care specialist and RT on board  - Consult to ID, recommendations appreciated  Plan is to continue Vancomycin and Cefipime  Will stop Flagyl 500 mg as per IDSA guidelines for pneumonia  - Will continue to monitor closely  If no noted improvement over the next 24-48 hrs, patient may need bronchoscopy for mucus plug removal   Cardiology consulted, recommendations appreciated: no recommendations for diuresis  - Follow-up blood and sputum culture  - Continue maintenance IVF     - Continue pulmonary maneuvers as per Pulmonology recommendations: incentive spirometer, DuoNeb Q6H hours, Hypertonic saline nebs Q6H  - Frequent chest PT per RT  - Will plan to wean off oxygen as tolerated    - Continue respiratory protocol  - Repeat CBC, CMP, procal in AM  - Continue to encourage incentive spirometer and flutter valve     Electrolyte abnormality  Assessment & Plan  Potassium trending down to 3 2 from 3 6  Mg low at 1 4     - Increased IVF potassium concentration to 40 mEq from 20mEq  - Will give Mg 2 g IV x 1  - F/u morning BMP    Nicotine dependence  Assessment & Plan  Chronic life long smoker     - Continue Nicotine patch    Diverticulitis  Assessment & Plan  Post op Day 3  Surgical wound with no signs of infection at this time  - Continue management as per Primary Surgical Team  - Patient will need close surgical follow-up      VTE Pharmacologic Prophylaxis:   Pharmacologic: Heparin  Mechanical VTE Prophylaxis in Place: Yes    Patient Centered Rounds: I have performed bedside rounds with nursing staff today  Discussions with Specialists or Other Care Team Provider: ID, Cardiology, RT    Education and Discussions with Family / Patient: Plan of care was discussed with patient and all questions were answered  Time Spent for Care: 30 minutes  More than 50% of total time spent on counseling and coordination of care as described above  Current Length of Stay: 3 day(s)    Current Patient Status: Inpatient   Certification Statement: The patient will continue to require additional inpatient hospital stay due to hypoxemia     Discharge Plan: As per primary surgical team    Code Status: Level 1 - Full Code      Subjective:   Reports significant improvement of dyspnea at rest when compared to yesterday  Reports dyspnea with even mild exertion  Denies having any n/v/d or chest pain  Reports she is utilizing incentive spirometry  Reports eating and voiding adequately  Objective:     Vitals:   Temp (24hrs), Av 1 °F (36 2 °C), Min:96 3 °F (35 7 °C), Max:97 7 °F (36 5 °C)    Temp:  [96 3 °F (35 7 °C)-97 7 °F (36 5 °C)] 97 7 °F (36 5 °C)  HR:  [89-93] 89  Resp:  [18-20] 20  BP: ()/(55-87) 134/87  SpO2:  [78 %-94 %] 89 %  Body mass index is 17 54 kg/m²  Input and Output Summary (last 24 hours): Intake/Output Summary (Last 24 hours) at 6/24/2021 1400  Last data filed at 6/24/2021 1300  Gross per 24 hour   Intake 1262 ml   Output 640 ml   Net 622 ml       Physical Exam:     Physical Exam  Constitutional:       Appearance: Normal appearance  HENT:      Head: Normocephalic and atraumatic  Nose: Nose normal    Eyes:      Conjunctiva/sclera: Conjunctivae normal    Cardiovascular:      Rate and Rhythm: Normal rate and regular rhythm  Heart sounds: Normal heart sounds  Pulmonary:      Effort: Pulmonary effort is normal  No respiratory distress  Breath sounds: Rales (mild crackles auscultated in left lower lung lobe) present  No wheezing  Musculoskeletal:         General: Normal range of motion  Cervical back: Normal range of motion  Right lower leg: No edema  Left lower leg: No edema  Skin:     General: Skin is warm and dry  Findings: Lesion (Surgical wound in periumbilical and LLQ region with no signs of infection at this time ) present  Neurological:      Mental Status: She is alert and oriented to person, place, and time  Psychiatric:         Behavior: Behavior normal        Additional Data:     Labs:    Results from last 7 days   Lab Units 06/24/21  0532 06/22/21  0512   WBC Thousand/uL 8 20 4 40*   HEMOGLOBIN g/dL 12 0 5 6*   HEMATOCRIT % 34 3* 15 9*   PLATELETS Thousands/uL 208 142*   NEUTROS PCT %  --  87*   LYMPHS PCT %  --  9*   LYMPHO PCT % 3*  --    MONOS PCT %  --  4   MONO PCT % 1  --    EOS PCT % 1 0     Results from last 7 days   Lab Units 06/24/21  0532 06/23/21  0454   POTASSIUM mmol/L 3 2* 3 6   CHLORIDE mmol/L 105 104   CO2 mmol/L 24 20*   BUN mg/dL 21 15   CREATININE mg/dL 0 78 0 74   CALCIUM mg/dL 8 4 8 3*   ALK PHOS U/L  --  44   ALT U/L  --  13   AST U/L  --  73*     Results from last 7 days   Lab Units 06/22/21  1602   INR  1 75*                 * I Have Reviewed All Lab Data Listed Above    * Additional Pertinent Lab Tests Reviewed: All [facility] Admission Reviewed      Recent Cultures (last 7 days):     Results from last 7 days   Lab Units 06/23/21  1609 06/23/21  0832 06/23/21  0831   BLOOD CULTURE   --  Received in Microbiology Lab  Culture in Progress  Received in Microbiology Lab  Culture in Progress  SPUTUM CULTURE  Culture too young- will reincubate  --   --    GRAM STAIN RESULT  1+ Epithelial cells per low power field*  No polys seen*  Rare Gram negative rods*  Rare Gram positive rods*  --   --        Last 24 Hours Medication List:   Current Facility-Administered Medications   Medication Dose Route Frequency Provider Last Rate    albuterol  2 5 mg Nebulization Q6H Miguel Bautista MD      cefepime  2,000 mg Intravenous Q12H Jodi Salazar MD 2,000 mg (06/24/21 0917)    heparin (porcine)  5,000 Units Subcutaneous Q8H Norman Valderrama MD      HYDROmorphone   Intravenous Continuous Lula Jones MD      nicotine  14 mg Transdermal Daily Miguel Bautista MD      ondansetron  4 mg Intravenous Q8H PRN Miguel Bautista MD      pantoprazole  40 mg Intravenous Q24H [address] Miguel Bautista MD      IV infusion builder   Intravenous Continuous Samy Ash MD 75 mL/hr at 06/24/21 0957    sodium chloride  4 mL Nebulization Q6H Kelsea Canales MD      vancomycin  12 5 mg/kg Intravenous Q12H Saniya Alexis MD Stopped (06/24/21 1215)        ** Please Note: Dictation voice to text software may have been used in the creation of this document   Simona Boykin MD  06/24/21  2:00 PM

## 2021-06-24 NOTE — PLAN OF CARE
Problem: MOBILITY - ADULT  Goal: Maintain or return to baseline ADL function  Description: INTERVENTIONS:  -  Assess patient's ability to carry out ADLs; assess patient's baseline for ADL function and identify physical deficits which impact ability to perform ADLs (bathing, care of mouth/teeth, toileting, grooming, dressing, etc )  - Assess/evaluate cause of self-care deficits   - Assess range of motion  - Assess patient's mobility; develop plan if impaired  - Assess patient's need for assistive devices and provide as appropriate  - Encourage maximum independence but intervene and supervise when necessary  - Involve family in performance of ADLs  - Assess for home care needs following discharge   - Consider OT consult to assist with ADL evaluation and planning for discharge  - Provide patient education as appropriate  Outcome: Progressing  Goal: Maintains/Returns to pre admission functional level  Description: INTERVENTIONS:  - Perform BMAT or MOVE assessment daily    - Set and communicate daily mobility goal to care team and patient/family/caregiver  - Collaborate with rehabilitation services on mobility goals if consulted  - Perform Range of Motion 3 times a day  - Reposition patient every 2 hours    - Dangle patient 3 times a day  - Stand patient 3 times a day  - Ambulate patient 3 times a day  - Out of bed to chair 3 times a day   - Out of bed for meals 3 times a day  - Out of bed for toileting  - Record patient progress and toleration of activity level   Outcome: Progressing     Problem: PAIN - ADULT  Goal: Verbalizes/displays adequate comfort level or baseline comfort level  Description: Interventions:  - Encourage patient to monitor pain and request assistance  - Assess pain using appropriate pain scale  - Administer analgesics based on type and severity of pain and evaluate response  - Implement non-pharmacological measures as appropriate and evaluate response  - Consider cultural and social influences on pain and pain management  - Notify physician/advanced practitioner if interventions unsuccessful or patient reports new pain  Outcome: Progressing     Problem: INFECTION - ADULT  Goal: Absence or prevention of progression during hospitalization  Description: INTERVENTIONS:  - Assess and monitor for signs and symptoms of infection  - Monitor lab/diagnostic results  - Monitor all insertion sites, i e  indwelling lines, tubes, and drains  - Monitor endotracheal if appropriate and nasal secretions for changes in amount and color  - [place] appropriate cooling/warming therapies per order  - Administer medications as ordered  - Instruct and encourage patient and family to use good hand hygiene technique  - Identify and instruct in appropriate isolation precautions for identified infection/condition  Outcome: Progressing  Goal: Absence of fever/infection during neutropenic period  Description: INTERVENTIONS:  - Monitor WBC    Outcome: Progressing     Problem: SAFETY ADULT  Goal: Maintain or return to baseline ADL function  Description: INTERVENTIONS:  -  Assess patient's ability to carry out ADLs; assess patient's baseline for ADL function and identify physical deficits which impact ability to perform ADLs (bathing, care of mouth/teeth, toileting, grooming, dressing, etc )  - Assess/evaluate cause of self-care deficits   - Assess range of motion  - Assess patient's mobility; develop plan if impaired  - Assess patient's need for assistive devices and provide as appropriate  - Encourage maximum independence but intervene and supervise when necessary  - Involve family in performance of ADLs  - Assess for home care needs following discharge   - Consider OT consult to assist with ADL evaluation and planning for discharge  - Provide patient education as appropriate  Outcome: Progressing  Goal: Patient will remain free of falls  Description: INTERVENTIONS:  - Educate patient/family on patient safety including physical limitations  - Instruct patient to call for assistance with activity   - Consult OT/PT to assist with strengthening/mobility   - Keep Call bell within reach  - Keep bed low and locked with side rails adjusted as appropriate  - Keep care items and personal belongings within reach  - Initiate and maintain comfort rounds  - Make Fall Risk Sign visible to staff  - Offer Toileting every 2 Hours, in advance of need  - Apply yellow socks and bracelet for high fall risk patients  - Consider moving patient to room near nurses station  Outcome: Progressing     Problem: DISCHARGE PLANNING  Goal: Discharge to home or other facility with appropriate resources  Description: INTERVENTIONS:  - Identify barriers to discharge w/patient and caregiver  - Arrange for needed discharge resources and transportation as appropriate  - Identify discharge learning needs (meds, wound care, etc )  - Arrange for interpretive services to assist at discharge as needed  - Refer to Case Management Department for coordinating discharge planning if the patient needs post-hospital services based on physician/advanced practitioner order or complex needs related to functional status, cognitive ability, or social support system  Outcome: Progressing     Problem: Knowledge Deficit  Goal: Patient/family/caregiver demonstrates understanding of disease process, treatment plan, medications, and discharge instructions  Description: Complete learning assessment and assess knowledge base    Interventions:  - Provide teaching at level of understanding  - Provide teaching via preferred learning methods  Outcome: Progressing     Problem: GENITOURINARY - ADULT  Goal: Maintains or returns to baseline urinary function  Description: INTERVENTIONS:  - Assess urinary function  - Encourage oral fluids to ensure adequate hydration if ordered  - Administer IV fluids as ordered to ensure adequate hydration  - Administer ordered medications as needed  - Offer frequent toileting  - Follow urinary retention protocol if ordered  Outcome: Progressing  Goal: Urinary catheter remains patent  Description: INTERVENTIONS:  - Assess patency of urinary catheter  - If patient has a chronic griffin, consider changing catheter if non-functioning  - Follow guidelines for intermittent irrigation of non-functioning urinary catheter  Outcome: Progressing     Problem: SKIN/TISSUE INTEGRITY - ADULT  Goal: Skin Integrity remains intact(Skin Breakdown Prevention)  Description: Assess:  -Perform [place] assessment every shift  -Inspect skin when repositioning, toileting, and assisting with ADLS  -Assess extremities for adequate circulation and sensation     Bed Management:  -Have minimal linens on bed & keep smooth, unwrinkled  -Change linens as needed when moist or perspiring    Activity:  -Mobilize patient 4 times a day  -Encourage activity and walks on unit  -Encourage or provide ROM exercises   -Turn and reposition patient every 2 Hours  -Use appropriate equipment to lift or move patient in bed    Skin Care:  -Avoid use of baby powder, tape, friction and shearing, hot water or constrictive clothing  -Relieve pressure over bony prominences using pillows  -Do not massage red bony areas    Outcome: Progressing  Goal: Incision(s), wounds(s) or drain site(s) healing without S/S of infection  Description: INTERVENTIONS  - Assess and document dressing, incision, wound bed, drain sites and surrounding tissue  - Provide patient and family education  Outcome: Progressing     Problem: Prexisting or High Potential for Compromised Skin Integrity  Goal: Skin integrity is maintained or improved  Description: INTERVENTIONS:  - Identify patients at risk for skin breakdown  - Assess and monitor skin integrity  - Assess and monitor nutrition and hydration status  - Monitor labs   - Assess for incontinence   - Turn and reposition patient  - Assist with mobility/ambulation  - Relieve pressure over bony prominences  - Avoid friction and shearing  - Provide appropriate hygiene as needed including keeping skin clean and dry  - Evaluate need for skin moisturizer/barrier cream  - Collaborate with interdisciplinary team   - Patient/family teaching  - Consider wound care consult   Outcome: Progressing

## 2021-06-24 NOTE — PROGRESS NOTES
Postop day 3  Status post sigmoid colon resection with primary anastomosis, left salpingectomy, rigid procto for severe sigmoid diverticulitis with colocutaneous fistula  Postop course with development of acute respiratory failure  History of 40 pack year cigarette smoking  CT of chest demonstrates no evidence of PE  Positive for emphysema  Positive for atelectasis and possible mucus plugging  Consultations with family practice, Cardiology, and pulmonology  Also Infectious Disease  Much improved with consultants help  Today she is awake alert  Incisional pain improving  Still short of breath but much improved  Tolerating clear liquids  Abdomen midline incision is healing sutures in place no evidence of infection  No BM or flatus yet  Impression:  1  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

## 2021-06-24 NOTE — ASSESSMENT & PLAN NOTE
Chronic life long smoker  Possible history of COPD - no formal evaluation done    Patient would benefit from pulmonary function tests    - Continue Nicotine patch

## 2021-06-24 NOTE — PLAN OF CARE
Problem: MOBILITY - ADULT  Goal: Maintain or return to baseline ADL function  Description: INTERVENTIONS:  -  Assess patient's ability to carry out ADLs; assess patient's baseline for ADL function and identify physical deficits which impact ability to perform ADLs (bathing, care of mouth/teeth, toileting, grooming, dressing, etc )  - Assess/evaluate cause of self-care deficits   - Assess range of motion  - Assess patient's mobility; develop plan if impaired  - Assess patient's need for assistive devices and provide as appropriate  - Encourage maximum independence but intervene and supervise when necessary  - Involve family in performance of ADLs  - Assess for home care needs following discharge   - Consider OT consult to assist with ADL evaluation and planning for discharge  - Provide patient education as appropriate  Outcome: Adequate for Discharge  Goal: Maintains/Returns to pre admission functional level  Description: INTERVENTIONS:  - Perform BMAT or MOVE assessment daily    - Set and communicate daily mobility goal to care team and patient/family/caregiver  - Collaborate with rehabilitation services on mobility goals if consulted  - Perform Range of Motion 4 times a day  - Reposition patient every 2 hours    - Dangle patient 4 times a day  - Stand patient 3 times a day  - Ambulate patient 2 times a day  - Out of bed to chair 3 times a day   - Out of bed for meals 3 times a day  - Out of bed for toileting  - Record patient progress and toleration of activity level   Outcome: Adequate for Discharge     Problem: PAIN - ADULT  Goal: Verbalizes/displays adequate comfort level or baseline comfort level  Description: Interventions:  - Encourage patient to monitor pain and request assistance  - Assess pain using appropriate pain scale  - Administer analgesics based on type and severity of pain and evaluate response  - Implement non-pharmacological measures as appropriate and evaluate response  - Consider cultural and social influences on pain and pain management  - Notify physician/advanced practitioner if interventions unsuccessful or patient reports new pain  Outcome: Adequate for Discharge     Problem: INFECTION - ADULT  Goal: Absence or prevention of progression during hospitalization  Description: INTERVENTIONS:  - Assess and monitor for signs and symptoms of infection  - Monitor lab/diagnostic results  - Monitor all insertion sites, i e  indwelling lines, tubes, and drains  - Monitor endotracheal if appropriate and nasal secretions for changes in amount and color  - [place] appropriate cooling/warming therapies per order  - Administer medications as ordered  - Instruct and encourage patient and family to use good hand hygiene technique  - Identify and instruct in appropriate isolation precautions for identified infection/condition  Outcome: Adequate for Discharge  Goal: Absence of fever/infection during neutropenic period  Description: INTERVENTIONS:  - Monitor WBC    Outcome: Adequate for Discharge

## 2021-06-24 NOTE — PROGRESS NOTES
Vancomycin IV Pharmacy-to-Dose Consultation    Rhett Bocanegra is a 62 y o  female who is currently receiving Vancomycin IV with management by the Pharmacy Consult service  Assessment/Plan:  The patient was reviewed  Renal function is stable and no signs or symptoms of nephrotoxicity and/or infusion reactions were documented in the chart  Based on todays assessment, continue current vancomycin (day # 2) dosing of 500mg q12h , with a plan for trough to be drawn at 1030 on 6/25/21  We will continue to follow the patients culture results and clinical progress daily      Virgen Liu, Pharmacist

## 2021-06-24 NOTE — PLAN OF CARE
Problem: OCCUPATIONAL THERAPY ADULT  Goal: Performs self-care activities at highest level of function for planned discharge setting  See evaluation for individualized goals  Note: Limitation: Decreased high-level ADLs, Decreased ADL status, Decreased UE strength, Decreased UE ROM, Decreased endurance, Decreased Safe judgement during ADL  Prognosis: Good  Assessment: Pt is a 62 y o  female seen for OT evaluation s/p admit to [facility] on 6/21/2021 w/ Colocutaneous fistula  Pt status post colon resection, ex lap on 6/21  See above for extensive list of comorbidities affecting Pt's functional performance at time of assessment  Personal factors affecting Pt at time of IE include:difficulty performing ADLS, difficulty performing IADLS  and health management   Prior to admission, Pt was independent with ADLs, did not use AD  Upon evaluation: Pt requires Luiza for all transfers and ambulation  Pt very limited by decreased activity tolerance and endurance, on 8L mid flow NC throughout with saturations ranging 90-93% following stand-pivot to commode  Pt requires assist with all LB ADLs at this time  The following deficits impact occupational performance: weakness, decreased strength, decreased balance, decreased tolerance, impaired problem solving and decreased safety awareness  Pt to benefit from continued skilled OT services while in the hospital to address deficits as defined above and maximize level of functional independence w ADL's and functional mobility  Occupational performance areas to address include: grooming, bathing/shower, toilet hygiene, dressing, health maintenance, functional mobility and clothing management  From OT standpoint, recommendation at time of d/c would be post acute rehab         OT Discharge Recommendation: Home with home health rehabilitation

## 2021-06-24 NOTE — PROGRESS NOTES
Cardiology Progress Note   Edd Jericho, MD Armstead Hoar, MD Elissa Pearl, DO, Dovie Primrose Mansoor, MD Marinus Helper, DO, Petrina Morita, DO, [facility]  ----------------------------------------------------------------  [address] [facility]  [address] [place] [address], [address] [facility]    Clovis Ball 62 y o  female MRN: [id number]  Unit/Bed#: 7T [facility] 705-01 Encounter: [id number]      ASSESSMENT:    Acute hypoxic respiratory failure secondary to bilateral pneumonia, right lower lobe atelectasis with mucus plugging   Non MI troponin elevation secondary to acute hypoxic respiratory failure   Probable COPD from chronic tobacco use   Colocutaneous fistula s/p sigmoid colonic resection, June 21, 2021   Dyslipidemia   LVEF 73%, normal diastolic function, mild TR w/ PASP 23 mm Hg, June 2021    PLAN:  Patient's history, examination and echocardiogram not consistent with cardiac component of hypoxic respiratory failure  Findings most consistent with pulmonary disease  IV diuresis p r n  Antibiotics as per primary team/pulmonology  Troponin elevation is trending downward and ECG only with nonspecific changes  Echocardiogram without regional wall motion abnormalities and patient without any cardiac symptoms  Findings likely secondary to non MI troponin elevation  Would benefit from eventual ischemic evaluation once acute pulmonary process has been resolved    Signed: Lauren Man DO, [facility], [facility]      History of Present Illness:  Patient seen and examined  Denies chest pain, pressure, tightness or squeezing  Admits to improve shortness of breath  Denies lower extremity swelling, orthopnea or paroxysmal nocturnal dyspnea  Denies lightheadedness, dizziness or palpitations  Review of Systems:  Review of Systems   Constitutional: Negative for decreased appetite, fever, weight gain and weight loss  HENT: Negative for congestion and sore throat  Eyes: Negative for visual disturbance     Cardiovascular: Positive for dyspnea on exertion  Negative for chest pain, leg swelling, near-syncope and palpitations  Respiratory: Positive for shortness of breath  Negative for cough  Hematologic/Lymphatic: Negative for bleeding problem  Skin: Negative for rash  Musculoskeletal: Negative for myalgias and neck pain  Gastrointestinal: Negative for abdominal pain and nausea  Neurological: Negative for light-headedness and weakness  Psychiatric/Behavioral: Negative for depression  No Known Allergies    No current facility-administered medications on file prior to encounter       Current Outpatient Medications on File Prior to Encounter   Medication Sig    ibuprofen (MOTRIN) 200 mg tablet Take 200 mg by mouth every 6 (six) hours as needed    sertraline (ZOLOFT) 50 mg tablet Take 100 mg by mouth daily        Current Facility-Administered Medications   Medication Dose Route Frequency Provider Last Rate    albuterol  2 5 mg Nebulization Q6H Jose Miguel Soler MD      cefepime  2,000 mg Intravenous Q12H Chhaya Jj MD 2,000 mg (06/24/21 0917)    heparin (porcine)  5,000 Units Subcutaneous Q8H [address] Jose Miguel Soler MD      HYDROmorphone   Intravenous Continuous Rudolph Harden MD      nicotine  14 mg Transdermal Daily Jose Miguel Soler MD      ondansetron  4 mg Intravenous Q8H PRN Jose Miguel Soler MD      pantoprazole  40 mg Intravenous Q24H [address] Jose Miguel Soler MD      IV infusion builder   Intravenous Continuous Keith Copeland MD 75 mL/hr at 06/24/21 0957    sodium chloride  4 mL Nebulization Q6H Kassandra Velazquez MD      vancomycin  12 5 mg/kg Intravenous Q12H Saniya Sepulveda  mg (06/24/21 1135)       HYDROmorphone,   IV infusion builder, , Last Rate: 75 mL/hr at 06/24/21 0957        Vitals:    06/24/21 0210 06/24/21 0215 06/24/21 0744 06/24/21 0800   BP:   120/77    BP Location:   Right arm    Pulse:   89    Resp:   20    Temp:   (!) 96 3 °F (35 7 °C)    TempSrc:   Temporal    SpO2: (!) 78% 93% 92% 94%   Weight:       Height:             Intake/Output Summary (Last 24 hours) at 6/24/2021 1207  Last data filed at 6/24/2021 0900  Gross per 24 hour   Intake 343 8 ml   Output 640 ml   Net -296 2 ml       Weight change:     PHYSICAL EXAMINATION:  Gen: Awake, Alert, NAD  Head/eyes: AT/NC, pupils equal and round, Anicteric  ENT: mmm  Neck: Supple, No elevated JVP, trachea midline  Resp:  Decreased breath sounds right greater than left  CV: RRR +S1, S2, No m/r/g  Abd: Soft, NT/ND + BS  Ext: no LE edema bilaterally  Neuro: Follows commands, moves all extermities  Psych: Appropriate affect, normal mood, pleasant attitude, non-combative  Skin: warm; no rash, erythema or venous stasis changes on exposed skin    Lab Results:  Results from last 7 days   Lab Units 06/24/21  0532   WBC Thousand/uL 8 20   HEMOGLOBIN g/dL 12 0   HEMATOCRIT % 34 3*   PLATELETS Thousands/uL 208     Results from last 7 days   Lab Units 06/24/21  0532 06/23/21  0454   POTASSIUM mmol/L 3 2* 3 6   CHLORIDE mmol/L 105 104   CO2 mmol/L 24 20*   BUN mg/dL 21 15   CREATININE mg/dL 0 78 0 74   CALCIUM mg/dL 8 4 8 3*   ALK PHOS U/L  --  44   ALT U/L  --  13   AST U/L  --  73*     No results found for: TROPONINT      Results from last 7 days   Lab Units 06/23/21  1140 06/23/21  0833 06/23/21  0529   TROPONIN I ng/mL 2 41* 2 88* 2 97*     Results from last 7 days   Lab Units 06/22/21  1602   INR  1 75*       Tele: SR    This note was completed in part utilizing [employer] Fluency Direct Software  Grammatical errors, random word insertions, spelling mistakes, and incomplete sentences may be an occasional consequence of this system secondary to software limitations, ambient noise, and hardware issues  If you have any questions or concerns about the content, text, or information contained within the body of this dictation, please contact the provider for clarification

## 2021-06-25 ENCOUNTER — HOSPITAL ENCOUNTER (INPATIENT)
Facility: HOSPITAL | Age: 61
LOS: 5 days | Discharge: LEFT BEFORE DISCHARGE | DRG: 177 | End: 2021-06-30
Attending: INTERNAL MEDICINE | Admitting: INTERNAL MEDICINE
Payer: COMMERCIAL

## 2021-06-25 ENCOUNTER — APPOINTMENT (INPATIENT)
Dept: RADIOLOGY | Facility: HOSPITAL | Age: 61
DRG: 177 | End: 2021-06-25
Payer: COMMERCIAL

## 2021-06-25 VITALS
WEIGHT: 99 LBS | TEMPERATURE: 96.9 F | SYSTOLIC BLOOD PRESSURE: 116 MMHG | RESPIRATION RATE: 20 BRPM | DIASTOLIC BLOOD PRESSURE: 87 MMHG | OXYGEN SATURATION: 93 % | HEIGHT: 63 IN | HEART RATE: 89 BPM | BODY MASS INDEX: 17.54 KG/M2

## 2021-06-25 DIAGNOSIS — K57.20 PERFORATION OF SIGMOID COLON DUE TO DIVERTICULITIS: ICD-10-CM

## 2021-06-25 DIAGNOSIS — K63.2 COLOCUTANEOUS FISTULA: Primary | ICD-10-CM

## 2021-06-25 DIAGNOSIS — D61.818 PANCYTOPENIA (HCC): ICD-10-CM

## 2021-06-25 DIAGNOSIS — E44.1 MILD PROTEIN-CALORIE MALNUTRITION (HCC): ICD-10-CM

## 2021-06-25 PROBLEM — E78.5 HYPERLIPIDEMIA: Status: RESOLVED | Noted: 2021-06-19 | Resolved: 2021-06-25

## 2021-06-25 PROBLEM — Z72.0 NICOTINE ABUSE: Status: ACTIVE | Noted: 2021-04-12

## 2021-06-25 PROBLEM — Z72.0 NICOTINE ABUSE: Status: RESOLVED | Noted: 2021-04-12 | Resolved: 2021-06-25

## 2021-06-25 LAB
ALBUMIN SERPL BCP-MCNC: 1.9 G/DL (ref 3.5–5)
ALP SERPL-CCNC: 54 U/L (ref 46–116)
ALT SERPL W P-5'-P-CCNC: 24 U/L (ref 12–78)
ANION GAP SERPL CALCULATED.3IONS-SCNC: 11 MMOL/L (ref 4–13)
ANISOCYTOSIS BLD QL SMEAR: PRESENT
AST SERPL W P-5'-P-CCNC: 57 U/L (ref 5–45)
BASOPHILS # BLD MANUAL: 0 THOUSAND/UL (ref 0–0.1)
BASOPHILS NFR MAR MANUAL: 0 % (ref 0–1)
BILIRUB SERPL-MCNC: 0.62 MG/DL (ref 0.2–1)
BUN SERPL-MCNC: 21 MG/DL (ref 5–25)
CALCIUM ALBUM COR SERPL-MCNC: 9.9 MG/DL (ref 8.3–10.1)
CALCIUM SERPL-MCNC: 8.2 MG/DL (ref 8.3–10.1)
CHLORIDE SERPL-SCNC: 106 MMOL/L (ref 100–108)
CO2 SERPL-SCNC: 22 MMOL/L (ref 21–32)
CREAT SERPL-MCNC: 0.98 MG/DL (ref 0.6–1.3)
EOSINOPHIL # BLD MANUAL: 0 THOUSAND/UL (ref 0–0.4)
EOSINOPHIL NFR BLD MANUAL: 0 % (ref 0–6)
ERYTHROCYTE [DISTWIDTH] IN BLOOD BY AUTOMATED COUNT: 19 % (ref 11.6–15.1)
FLUAV RNA NPH QL NAA+PROBE: NORMAL
FLUBV RNA NPH QL NAA+PROBE: NORMAL
GFR SERPL CREATININE-BSD FRML MDRD: 63 ML/MIN/1.73SQ M
GLUCOSE SERPL-MCNC: 114 MG/DL (ref 65–140)
GLUCOSE SERPL-MCNC: 160 MG/DL (ref 65–140)
GLUCOSE SERPL-MCNC: 91 MG/DL (ref 65–140)
HCT VFR BLD AUTO: 36.3 % (ref 34.8–46.1)
HGB BLD-MCNC: 12.4 G/DL (ref 11.5–15.4)
LYMPHOCYTES # BLD AUTO: 0.28 THOUSAND/UL (ref 0.6–4.47)
LYMPHOCYTES # BLD AUTO: 5 % (ref 14–44)
MAGNESIUM SERPL-MCNC: 2.2 MG/DL (ref 1.6–2.6)
MCH RBC QN AUTO: 34 PG (ref 26.8–34.3)
MCHC RBC AUTO-ENTMCNC: 34.2 G/DL (ref 31.4–37.4)
MCV RBC AUTO: 100 FL (ref 82–98)
MONOCYTES # BLD AUTO: 0.11 THOUSAND/UL (ref 0–1.22)
MONOCYTES NFR BLD: 2 % (ref 4–12)
NEUTROPHILS # BLD MANUAL: 5.12 THOUSAND/UL (ref 1.85–7.62)
NEUTS BAND NFR BLD MANUAL: 3 % (ref 0–8)
NEUTS SEG NFR BLD AUTO: 90 % (ref 43–75)
NRBC BLD AUTO-RTO: 0 /100 WBCS
OVALOCYTES BLD QL SMEAR: PRESENT
PLATELET # BLD AUTO: 215 THOUSANDS/UL (ref 149–390)
PLATELET BLD QL SMEAR: ADEQUATE
PMV BLD AUTO: 9.5 FL (ref 8.9–12.7)
POTASSIUM SERPL-SCNC: 4.1 MMOL/L (ref 3.5–5.3)
PROCALCITONIN SERPL-MCNC: 2.47 NG/ML
PROT SERPL-MCNC: 6 G/DL (ref 6.4–8.2)
RBC # BLD AUTO: 3.65 MILLION/UL (ref 3.81–5.12)
RSV RNA NPH QL NAA+PROBE: NORMAL
SODIUM SERPL-SCNC: 139 MMOL/L (ref 136–145)
TOTAL CELLS COUNTED SPEC: 100
VANCOMYCIN TROUGH SERPL-MCNC: 18.5 UG/ML (ref 10–20)
WBC # BLD AUTO: 5.51 THOUSAND/UL (ref 4.31–10.16)

## 2021-06-25 PROCEDURE — 83735 ASSAY OF MAGNESIUM: CPT | Performed by: NURSE PRACTITIONER

## 2021-06-25 PROCEDURE — 99233 SBSQ HOSP IP/OBS HIGH 50: CPT | Performed by: SURGERY

## 2021-06-25 PROCEDURE — 94640 AIRWAY INHALATION TREATMENT: CPT

## 2021-06-25 PROCEDURE — 94002 VENT MGMT INPAT INIT DAY: CPT

## 2021-06-25 PROCEDURE — 99221 1ST HOSP IP/OBS SF/LOW 40: CPT | Performed by: INTERNAL MEDICINE

## 2021-06-25 PROCEDURE — 85007 BL SMEAR W/DIFF WBC COUNT: CPT | Performed by: NURSE PRACTITIONER

## 2021-06-25 PROCEDURE — 87631 RESP VIRUS 3-5 TARGETS: CPT | Performed by: INTERNAL MEDICINE

## 2021-06-25 PROCEDURE — 97167 OT EVAL HIGH COMPLEX 60 MIN: CPT

## 2021-06-25 PROCEDURE — 80202 ASSAY OF VANCOMYCIN: CPT | Performed by: NURSE PRACTITIONER

## 2021-06-25 PROCEDURE — 97163 PT EVAL HIGH COMPLEX 45 MIN: CPT

## 2021-06-25 PROCEDURE — 71045 X-RAY EXAM CHEST 1 VIEW: CPT

## 2021-06-25 PROCEDURE — 99233 SBSQ HOSP IP/OBS HIGH 50: CPT | Performed by: INTERNAL MEDICINE

## 2021-06-25 PROCEDURE — 85027 COMPLETE CBC AUTOMATED: CPT | Performed by: NURSE PRACTITIONER

## 2021-06-25 PROCEDURE — 99239 HOSP IP/OBS DSCHRG MGMT >30: CPT | Performed by: FAMILY MEDICINE

## 2021-06-25 PROCEDURE — 94668 MNPJ CHEST WALL SBSQ: CPT

## 2021-06-25 PROCEDURE — 82948 REAGENT STRIP/BLOOD GLUCOSE: CPT

## 2021-06-25 PROCEDURE — 80053 COMPREHEN METABOLIC PANEL: CPT | Performed by: NURSE PRACTITIONER

## 2021-06-25 PROCEDURE — C9113 INJ PANTOPRAZOLE SODIUM, VIA: HCPCS | Performed by: NURSE PRACTITIONER

## 2021-06-25 PROCEDURE — 94760 N-INVAS EAR/PLS OXIMETRY 1: CPT

## 2021-06-25 PROCEDURE — 84145 PROCALCITONIN (PCT): CPT | Performed by: NURSE PRACTITIONER

## 2021-06-25 RX ORDER — ALBUTEROL SULFATE 2.5 MG/3ML
2.5 SOLUTION RESPIRATORY (INHALATION)
Status: DISCONTINUED | OUTPATIENT
Start: 2021-06-25 | End: 2021-06-25

## 2021-06-25 RX ORDER — PANTOPRAZOLE SODIUM 40 MG/1
40 TABLET, DELAYED RELEASE ORAL
Status: DISCONTINUED | OUTPATIENT
Start: 2021-06-26 | End: 2021-06-30 | Stop reason: HOSPADM

## 2021-06-25 RX ORDER — VANCOMYCIN HYDROCHLORIDE 500 MG/100ML
12.5 INJECTION, SOLUTION INTRAVENOUS EVERY 12 HOURS
Status: CANCELLED | OUTPATIENT
Start: 2021-06-25

## 2021-06-25 RX ORDER — LEVALBUTEROL 1.25 MG/.5ML
1.25 SOLUTION, CONCENTRATE RESPIRATORY (INHALATION)
Status: DISCONTINUED | OUTPATIENT
Start: 2021-06-25 | End: 2021-06-25

## 2021-06-25 RX ORDER — OXYCODONE HYDROCHLORIDE AND ACETAMINOPHEN 5; 325 MG/1; MG/1
1 TABLET ORAL EVERY 4 HOURS PRN
Status: CANCELLED | OUTPATIENT
Start: 2021-06-25

## 2021-06-25 RX ORDER — HEPARIN SODIUM 5000 [USP'U]/ML
5000 INJECTION, SOLUTION INTRAVENOUS; SUBCUTANEOUS EVERY 8 HOURS SCHEDULED
Status: DISCONTINUED | OUTPATIENT
Start: 2021-06-25 | End: 2021-06-30 | Stop reason: HOSPADM

## 2021-06-25 RX ORDER — PREDNISONE 20 MG/1
40 TABLET ORAL DAILY
Status: DISCONTINUED | OUTPATIENT
Start: 2021-06-25 | End: 2021-06-26

## 2021-06-25 RX ORDER — SODIUM CHLORIDE 30 MG/ML INHALATION SOLUTION 30 MG/ML
4 SOLUTION INHALANT
Status: DISCONTINUED | OUTPATIENT
Start: 2021-06-25 | End: 2021-06-26

## 2021-06-25 RX ORDER — HEPARIN SODIUM 5000 [USP'U]/ML
5000 INJECTION, SOLUTION INTRAVENOUS; SUBCUTANEOUS EVERY 8 HOURS SCHEDULED
Status: CANCELLED | OUTPATIENT
Start: 2021-06-25

## 2021-06-25 RX ORDER — ONDANSETRON 2 MG/ML
4 INJECTION INTRAMUSCULAR; INTRAVENOUS EVERY 8 HOURS PRN
Status: CANCELLED | OUTPATIENT
Start: 2021-06-25

## 2021-06-25 RX ORDER — OXYCODONE HYDROCHLORIDE 5 MG/1
5 TABLET ORAL EVERY 6 HOURS SCHEDULED
Status: DISCONTINUED | OUTPATIENT
Start: 2021-06-25 | End: 2021-06-30 | Stop reason: HOSPADM

## 2021-06-25 RX ORDER — PANTOPRAZOLE SODIUM 40 MG/1
40 INJECTION, POWDER, FOR SOLUTION INTRAVENOUS
Status: CANCELLED | OUTPATIENT
Start: 2021-06-25

## 2021-06-25 RX ORDER — VANCOMYCIN HYDROCHLORIDE 500 MG/100ML
12.5 INJECTION, SOLUTION INTRAVENOUS EVERY 12 HOURS
Status: DISCONTINUED | OUTPATIENT
Start: 2021-06-25 | End: 2021-06-25

## 2021-06-25 RX ORDER — OXYCODONE HYDROCHLORIDE AND ACETAMINOPHEN 5; 325 MG/1; MG/1
1 TABLET ORAL EVERY 4 HOURS PRN
Status: DISCONTINUED | OUTPATIENT
Start: 2021-06-25 | End: 2021-06-30 | Stop reason: HOSPADM

## 2021-06-25 RX ORDER — CEFEPIME HYDROCHLORIDE 2 G/50ML
2000 INJECTION, SOLUTION INTRAVENOUS EVERY 12 HOURS
Status: CANCELLED | OUTPATIENT
Start: 2021-06-25

## 2021-06-25 RX ORDER — NICOTINE 21 MG/24HR
14 PATCH, TRANSDERMAL 24 HOURS TRANSDERMAL DAILY
Status: DISCONTINUED | OUTPATIENT
Start: 2021-06-25 | End: 2021-06-30 | Stop reason: HOSPADM

## 2021-06-25 RX ORDER — PANTOPRAZOLE SODIUM 40 MG/1
40 INJECTION, POWDER, FOR SOLUTION INTRAVENOUS
Status: DISCONTINUED | OUTPATIENT
Start: 2021-06-25 | End: 2021-06-25

## 2021-06-25 RX ORDER — METHYLPREDNISOLONE SODIUM SUCCINATE 125 MG/2ML
125 INJECTION, POWDER, LYOPHILIZED, FOR SOLUTION INTRAMUSCULAR; INTRAVENOUS ONCE
Status: CANCELLED | OUTPATIENT
Start: 2021-06-25

## 2021-06-25 RX ORDER — SERTRALINE HYDROCHLORIDE 100 MG/1
100 TABLET, FILM COATED ORAL DAILY
Status: DISCONTINUED | OUTPATIENT
Start: 2021-06-25 | End: 2021-06-30 | Stop reason: HOSPADM

## 2021-06-25 RX ORDER — FUROSEMIDE 10 MG/ML
20 INJECTION INTRAMUSCULAR; INTRAVENOUS ONCE
Status: COMPLETED | OUTPATIENT
Start: 2021-06-25 | End: 2021-06-25

## 2021-06-25 RX ORDER — VANCOMYCIN HYDROCHLORIDE 1 G/200ML
20 INJECTION, SOLUTION INTRAVENOUS EVERY 24 HOURS
Status: DISCONTINUED | OUTPATIENT
Start: 2021-06-26 | End: 2021-06-26

## 2021-06-25 RX ORDER — SODIUM CHLORIDE 30 MG/ML INHALATION SOLUTION 30 MG/ML
4 SOLUTION INHALANT
Status: CANCELLED | OUTPATIENT
Start: 2021-06-25

## 2021-06-25 RX ORDER — ALBUTEROL SULFATE 2.5 MG/3ML
2.5 SOLUTION RESPIRATORY (INHALATION)
Status: CANCELLED | OUTPATIENT
Start: 2021-06-25

## 2021-06-25 RX ORDER — METHYLPREDNISOLONE SODIUM SUCCINATE 125 MG/2ML
125 INJECTION, POWDER, LYOPHILIZED, FOR SOLUTION INTRAMUSCULAR; INTRAVENOUS ONCE
Status: DISCONTINUED | OUTPATIENT
Start: 2021-06-25 | End: 2021-06-25

## 2021-06-25 RX ORDER — ONDANSETRON 2 MG/ML
4 INJECTION INTRAMUSCULAR; INTRAVENOUS EVERY 8 HOURS PRN
Status: DISCONTINUED | OUTPATIENT
Start: 2021-06-25 | End: 2021-06-30 | Stop reason: HOSPADM

## 2021-06-25 RX ORDER — NICOTINE 21 MG/24HR
14 PATCH, TRANSDERMAL 24 HOURS TRANSDERMAL DAILY
Status: CANCELLED | OUTPATIENT
Start: 2021-06-25

## 2021-06-25 RX ORDER — IPRATROPIUM BROMIDE AND ALBUTEROL SULFATE 2.5; .5 MG/3ML; MG/3ML
3 SOLUTION RESPIRATORY (INHALATION)
Status: DISCONTINUED | OUTPATIENT
Start: 2021-06-25 | End: 2021-06-30 | Stop reason: HOSPADM

## 2021-06-25 RX ORDER — FUROSEMIDE 10 MG/ML
20 INJECTION INTRAMUSCULAR; INTRAVENOUS ONCE
Status: DISCONTINUED | OUTPATIENT
Start: 2021-06-25 | End: 2021-06-25

## 2021-06-25 RX ORDER — FUROSEMIDE 10 MG/ML
20 INJECTION INTRAMUSCULAR; INTRAVENOUS ONCE
Status: CANCELLED | OUTPATIENT
Start: 2021-06-25

## 2021-06-25 RX ORDER — OXYCODONE HYDROCHLORIDE AND ACETAMINOPHEN 5; 325 MG/1; MG/1
2 TABLET ORAL EVERY 4 HOURS PRN
Status: DISCONTINUED | OUTPATIENT
Start: 2021-06-25 | End: 2021-06-25

## 2021-06-25 RX ORDER — OXYCODONE HYDROCHLORIDE AND ACETAMINOPHEN 5; 325 MG/1; MG/1
2 TABLET ORAL EVERY 4 HOURS PRN
Status: CANCELLED | OUTPATIENT
Start: 2021-06-25

## 2021-06-25 RX ORDER — METHYLPREDNISOLONE SODIUM SUCCINATE 125 MG/2ML
125 INJECTION, POWDER, LYOPHILIZED, FOR SOLUTION INTRAMUSCULAR; INTRAVENOUS DAILY
Status: DISCONTINUED | OUTPATIENT
Start: 2021-06-25 | End: 2021-06-25

## 2021-06-25 RX ADMIN — SODIUM CHLORIDE, SODIUM LACTATE, POTASSIUM CHLORIDE, AND CALCIUM CHLORIDE 250 ML: .6; .31; .03; .02 INJECTION, SOLUTION INTRAVENOUS at 14:20

## 2021-06-25 RX ADMIN — HEPARIN SODIUM 5000 UNITS: 5000 INJECTION INTRAVENOUS; SUBCUTANEOUS at 06:13

## 2021-06-25 RX ADMIN — OXYCODONE HYDROCHLORIDE 5 MG: 5 TABLET ORAL at 23:50

## 2021-06-25 RX ADMIN — METHYLPREDNISOLONE SODIUM SUCCINATE 125 MG: 125 INJECTION, POWDER, FOR SOLUTION INTRAMUSCULAR; INTRAVENOUS at 02:16

## 2021-06-25 RX ADMIN — PREDNISONE 40 MG: 20 TABLET ORAL at 11:50

## 2021-06-25 RX ADMIN — HEPARIN SODIUM 5000 UNITS: 5000 INJECTION INTRAVENOUS; SUBCUTANEOUS at 21:23

## 2021-06-25 RX ADMIN — VANCOMYCIN HYDROCHLORIDE 500 MG: 500 INJECTION, SOLUTION INTRAVENOUS at 11:46

## 2021-06-25 RX ADMIN — ALBUTEROL SULFATE 2.5 MG: 2.5 SOLUTION RESPIRATORY (INHALATION) at 07:51

## 2021-06-25 RX ADMIN — FUROSEMIDE 20 MG: 10 INJECTION, SOLUTION INTRAVENOUS at 06:13

## 2021-06-25 RX ADMIN — OXYCODONE HYDROCHLORIDE 5 MG: 5 TABLET ORAL at 11:52

## 2021-06-25 RX ADMIN — SERTRALINE HYDROCHLORIDE 100 MG: 100 TABLET ORAL at 11:51

## 2021-06-25 RX ADMIN — CEFEPIME HYDROCHLORIDE 2000 MG: 2 INJECTION, POWDER, FOR SOLUTION INTRAVENOUS at 10:25

## 2021-06-25 RX ADMIN — Medication 14 MG: at 08:27

## 2021-06-25 RX ADMIN — SODIUM CHLORIDE SOLN NEBU 3% 4 ML: 3 NEBU SOLN at 14:29

## 2021-06-25 RX ADMIN — OXYCODONE HYDROCHLORIDE 5 MG: 5 TABLET ORAL at 17:44

## 2021-06-25 RX ADMIN — IPRATROPIUM BROMIDE AND ALBUTEROL SULFATE 3 ML: 2.5; .5 SOLUTION RESPIRATORY (INHALATION) at 14:29

## 2021-06-25 RX ADMIN — IPRATROPIUM BROMIDE AND ALBUTEROL SULFATE 3 ML: 2.5; .5 SOLUTION RESPIRATORY (INHALATION) at 20:02

## 2021-06-25 RX ADMIN — METRONIDAZOLE 500 MG: 500 INJECTION, SOLUTION INTRAVENOUS at 08:23

## 2021-06-25 RX ADMIN — CEFEPIME HYDROCHLORIDE 2000 MG: 2 INJECTION, POWDER, FOR SOLUTION INTRAVENOUS at 21:23

## 2021-06-25 RX ADMIN — SODIUM CHLORIDE SOLN NEBU 3% 4 ML: 3 NEBU SOLN at 20:02

## 2021-06-25 RX ADMIN — SODIUM CHLORIDE SOLN NEBU 3% 4 ML: 3 NEBU SOLN at 07:51

## 2021-06-25 RX ADMIN — PANTOPRAZOLE SODIUM 40 MG: 40 INJECTION, POWDER, FOR SOLUTION INTRAVENOUS at 08:27

## 2021-06-25 RX ADMIN — FUROSEMIDE 20 MG: 10 INJECTION, SOLUTION INTRAVENOUS at 02:16

## 2021-06-25 RX ADMIN — HEPARIN SODIUM 5000 UNITS: 5000 INJECTION INTRAVENOUS; SUBCUTANEOUS at 13:44

## 2021-06-25 NOTE — PHYSICAL THERAPY NOTE
Physical Therapy Evaluation    Patient's Name: Tiago Avilez    Admitting Diagnosis  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]    Problem List  Patient Active Problem List   Diagnosis    Depression    Smoking    Anxiety    Hyperlipidemia    Colocutaneous fistula    Perforation of sigmoid colon due to diverticulitis    Acute respiratory failure with hypoxia (HCC)    Mild protein-calorie malnutrition (HCC)    Electrolyte abnormality    Diverticulitis    Nicotine dependence    MI (acute kidney injury) ([facility] [address] )       Past Medical History  Past Medical History:   Diagnosis Date    Anxiety     Hyperlipidemia        Past Surgical History  Past Surgical History:   Procedure Laterality Date    FISTULA REPAIR Left     Lower abdomen    MA LAP,SURG,COLECTOMY, PARTIAL, W/ANAST N/A 6/21/2021    Procedure: RESECTION COLON SIGMOID LAPAROSCOPIC (attempted), open resection sigmoid colon, left salpingectomy;  Surgeon: Nancy Berrios MD;  Location: [facility] MAIN OR;  Service: General       Recent Imaging  XR chest portable   Final Result by Ayo Johnson MD (06/23 9177)      Extensive bilateral consolidation and groundglass opacity due to atelectasis and edema versus pneumonia on CT      Small right effusion  Workstation performed: [id number]         CTA chest pe study   Final Result by Lamont Abdullahi DO (06/23 6324)      No pulmonary embolism is seen  Superimposed on mild scattered pulmonary emphysematous changes, there are small bilateral pleural effusions, right greater than left  Attenuation of the bronchus intermedius extending into the right middle and lower lobe bronchi probably related to    mucous plugging, inspissated secretions, and/or aspiration  Associated atelectasis in the bilateral lower lobes as well as the lateral right middle lobe        Patchy alveolar and groundglass opacities in the posterior and peripheral right upper lobe with extensive groundglass and alveolar opacities in the left upper and lower lobes which could represent infection, inflammation, and/or edema (which could be    cardiogenic or noncardiogenic including ARDS)  Correlation with the patient's symptoms and laboratory values recommended  Small amount of ascites and intraperitoneal free air, probably related to recent intervention  Other findings as above              Workstation performed: [id number]         XR chest portable    (Results Pending)       Recent Vital Signs  Vitals:    06/24/21 1956 06/24/21 2251 06/24/21 2359 06/25/21 0034   BP:  142/94 116/87    BP Location:   Right arm    Pulse:  96 89    Resp:   20    Temp:   (!) 96 9 °F (36 1 °C)    TempSrc:   Temporal    SpO2: (!) 89%  (!) 79% 93%   Weight:       Height:            06/24/21 1035   PT Last Visit   PT Visit Date 06/25/21   Note Type   Note type Evaluation   Pain Assessment   Pain Assessment Tool 0-10   Pain Score No Pain   Home Living   Type of Home House   Home Layout Multi-level;Stairs to enter with rails;Bed/bath upstairs;1/2 bath on main level   Bathroom Shower/Tub Walk-in shower   Bathroom Toilet Standard   Prior Function   Level of [place] Independent with ADLs and functional mobility   Lives With [place] Help From Family   ADL Assistance Independent   IADLs Independent   Falls in the last 6 months 0   Vocational Part time employment   Restrictions/Precautions   [facility] Bearing Precautions Per Order No   Other Precautions O2;Fall Risk;Multiple lines   General   Family/Caregiver Present No   Cognition   Overall Cognitive Status WFL   Arousal/Participation Alert   Attention Within functional limits   Orientation Level Oriented X4   Memory Within functional limits   Following Commands Follows all commands and directions without difficulty   Strength RLE   R Hip Flexion 3+/5   R Knee Flexion 3+/5   R Knee Extension 3+/5   R Ankle Dorsiflexion 3+/5   R Ankle Plantar Flexion 3+/5   Strength LLE   L Hip Flexion 3+/5   L Knee Flexion 3+/5   L Knee Extension 3+/5   L Ankle Dorsiflexion 3+/5   L Ankle Plantar Flexion 3+/5   Coordination   Movements are Fluid and Coordinated 1   Sensation WFL   Light Touch   RLE Light Touch Grossly intact   LLE Light Touch Grossly intact   Bed Mobility   Supine to Sit 4  Minimal assistance   Additional items Assist x 1;Bedrails; Increased time required;Verbal cues;LE management   Sit to Supine 4  Minimal assistance   Additional items Assist x 1;Bedrails; Increased time required;Verbal cues;LE management   Transfers   Sit to Stand 4  Minimal assistance   Additional items Assist x 1; Armrests; Increased time required;Verbal cues   Stand to Sit 4  Minimal assistance   Additional items Assist x 1; Armrests; Increased time required;Verbal cues   Ambulation/Elevation   Gait pattern Excessively slow; Short stride; Foward flexed;Decreased foot clearance   Gait Assistance 4  Minimal assist   Additional items Assist x 1;Verbal cues; Tactile cues   Assistive Device Rolling walker   Distance 15ft   Balance   Static Sitting Good   Dynamic Sitting Fair +   Static Standing Fair   Dynamic Standing Fair -   Ambulatory Fair -   Endurance Deficit   Endurance Deficit Yes   Endurance Deficit Description SOB/BUSTOS; O2 drop to 85%    Activity Tolerance   Activity Tolerance Patient limited by fatigue   Medical Staff Made Aware spoke to CM   Nurse Made Aware spoke to RN   Assessment   Prognosis Fair   Problem List Decreased strength;Decreased endurance; Impaired balance;Decreased mobility; Decreased skin integrity   Barriers to Discharge Inaccessible home environment;Decreased caregiver support   Goals   Patient Goals to breath better   STG Expiration Date 07/05/21   Short Term Goal #1 Pt will complete bed mobility mod I, transfers mod I, ambulation mod I with RW, FF stiars mod I to return home  Plan   Treatment/Interventions ADL retraining;Elevations;LE strengthening/ROM; Functional transfer training; Therapeutic exercise; Endurance training;Patient/family training;Equipment eval/education; Bed mobility;Gait training;Spoke to nursing;Spoke to case management;OT   PT Frequency   (3-5x/wk)   Recommendation   PT Discharge Recommendation Post acute rehabilitation services   Equipment Recommended [facility] Recommended Wheeled walker   AM-PAC Basic Mobility Inpatient   Turning in Bed Without Bedrails 3   Lying on Back to Sitting on Edge of Flat Bed 3   Moving Bed to Chair 3   Standing Up From Chair 3   Walk in Room 3   Climb 3-5 Stairs 2   Basic Mobility Inpatient Raw Score 17   Basic Mobility Standardized Score 39 67         ASSESSMENT                                                                                                                     Elena Henriquez is a 62 y o  female admitted to [facility] on 6/21/2021 for Acute respiratory failure with hypoxia ([facility] [address] )  Pt  has a past medical history of Anxiety and Hyperlipidemia  PT was consulted and pt was seen on 6/25/2021 for mobility assessment and d/c planning  Pt presents Supine in bed alert and agreeable to therapy  She demonstrates weakness in BLE due to fatigue  Sensation is intact to BLEs  She is very limited with endurance and activity tolerance due to decreased O2 saturation, on 12L midflow unable to maintain above 90% with activity  Educated on breathing exercises and use of incentive spirometer as well as acapella  Balance is fair with RW  Pt is currently functioning at a minimum assistance x1 level for bed mobility, minimum assistance x1 level for transfers, minimum assistance x1 level for ambulation with Rolling Walker  The patient's AM-PAC Basic Mobility Inpatient Short Form Raw Score is 17, Standardized Score is 39 67  A standardized score less than 42 9 suggests the patient may benefit from discharge to post-acute rehabilitation services  Please also refer to the recommendation of the Physical Therapist for safe discharge planning        Recommendations Pt will benefit from continued skilled IP PT to address the above mentioned impairments in order to maximize recovery and increase functional independence when completing mobility and ADLs  See flow sheet for goals and POC       DME:  rolling walker    Discharge Disposition:  Post Acute Rehab Services      Harjinder Saldivar PT, DPT

## 2021-06-25 NOTE — PLAN OF CARE
Problem: MOBILITY - ADULT  Goal: Maintain or return to baseline ADL function  Description: INTERVENTIONS:  -  Assess patient's ability to carry out ADLs; assess patient's baseline for ADL function and identify physical deficits which impact ability to perform ADLs (bathing, care of mouth/teeth, toileting, grooming, dressing, etc )  - Assess/evaluate cause of self-care deficits   - Assess range of motion  - Assess patient's mobility; develop plan if impaired  - Assess patient's need for assistive devices and provide as appropriate  - Encourage maximum independence but intervene and supervise when necessary  - Involve family in performance of ADLs  - Assess for home care needs following discharge   - Consider OT consult to assist with ADL evaluation and planning for discharge  - Provide patient education as appropriate  Outcome: Progressing  Goal: Maintains/Returns to pre admission functional level  Description: INTERVENTIONS:  - Perform BMAT or MOVE assessment daily    - Set and communicate daily mobility goal to care team and patient/family/caregiver     - Collaborate with rehabilitation services on mobility goals if consulted  - Outcome: Progressing     Problem: PAIN - ADULT  Goal: Verbalizes/displays adequate comfort level or baseline comfort level  Description: Interventions:  - Encourage patient to monitor pain and request assistance  - Assess pain using appropriate pain scale  - Administer analgesics based on type and severity of pain and evaluate response  - Implement non-pharmacological measures as appropriate and evaluate response  - Consider cultural and social influences on pain and pain management  - Notify physician/advanced practitioner if interventions unsuccessful or patient reports new pain  Outcome: Progressing     Problem: INFECTION - ADULT  Goal: Absence or prevention of progression during hospitalization  Description: INTERVENTIONS:  - Assess and monitor for signs and symptoms of infection  - Monitor lab/diagnostic results  - Monitor all insertion sites, i e  indwelling lines, tubes, and drains  - Monitor endotracheal if appropriate and nasal secretions for changes in amount and color  - [place] appropriate cooling/warming therapies per order  - Administer medications as ordered  - Instruct and encourage patient and family to use good hand hygiene technique  - Identify and instruct in appropriate isolation precautions for identified infection/condition  Outcome: Progressing  Goal: Absence of fever/infection during neutropenic period  Description: INTERVENTIONS:  - Monitor WBC    Outcome: Progressing     Problem: SAFETY ADULT  Goal: Maintain or return to baseline ADL function  Description: INTERVENTIONS:  -  Assess patient's ability to carry out ADLs; assess patient's baseline for ADL function and identify physical deficits which impact ability to perform ADLs (bathing, care of mouth/teeth, toileting, grooming, dressing, etc )  - Assess/evaluate cause of self-care deficits   - Assess range of motion  - Assess patient's mobility; develop plan if impaired  - Assess patient's need for assistive devices and provide as appropriate  - Encourage maximum independence but intervene and supervise when necessary  - Involve family in performance of ADLs  - Assess for home care needs following discharge   - Consider OT consult to assist with ADL evaluation and planning for discharge  - Provide patient education as appropriate  Outcome: Progressing  Goal: Maintains/Returns to pre admission functional level  Description: INTERVENTIONS:  - Perform BMAT or MOVE assessment daily    - Set and communicate daily mobility goal to care team and patient/family/caregiver     - Collaborate with rehabilitation services on mobility goals if consulted  - P- Out of bed for toileting  - Record patient progress and toleration of activity level   Outcome: Progressing  Goal: Patient will remain free of falls  Description: INTERVENTIONS:  - Educate patient/family on patient safety including physical limitations  - Instruct patient to call for assistance with activity   - Consult OT/PT to assist with strengthening/mobility   - Keep Call bell within reach  - Keep bed low and locked with side rails adjusted as appropriate  - Keep care items and personal belongings within reach  - Initiate and maintain comfort rounds  - Make Fall Risk Sign visible to staff  - Apply yellow socks and bracelet for high fall risk patients  - Consider moving patient to room near nurses station  Outcome: Progressing     Problem: DISCHARGE PLANNING  Goal: Discharge to home or other facility with appropriate resources  Description: INTERVENTIONS:  - Identify barriers to discharge w/patient and caregiver  - Arrange for needed discharge resources and transportation as appropriate  - Identify discharge learning needs (meds, wound care, etc )  - Arrange for interpretive services to assist at discharge as needed  - Refer to Case Management Department for coordinating discharge planning if the patient needs post-hospital services based on physician/advanced practitioner order or complex needs related to functional status, cognitive ability, or social support system  Outcome: Progressing     Problem: Knowledge Deficit  Goal: Patient/family/caregiver demonstrates understanding of disease process, treatment plan, medications, and discharge instructions  Description: Complete learning assessment and assess knowledge base    Interventions:  - Provide teaching at level of understanding  - Provide teaching via preferred learning methods  Outcome: Progressing     Problem: GENITOURINARY - ADULT  Goal: Maintains or returns to baseline urinary function  Description: INTERVENTIONS:  - Assess urinary function  - Encourage oral fluids to ensure adequate hydration if ordered  - Administer IV fluids as ordered to ensure adequate hydration  - Administer ordered medications as needed  - Offer frequent toileting  - Follow urinary retention protocol if ordered  Outcome: Progressing  Goal: Urinary catheter remains patent  Description: INTERVENTIONS:  - Assess patency of urinary catheter  - If patient has a chronic griffin, consider changing catheter if non-functioning  - Follow guidelines for intermittent irrigation of non-functioning urinary catheter  Outcome: Progressing     Problem: SKIN/TISSUE INTEGRITY - ADULT  Goal: Skin Integrity remains intact(Skin Breakdown Prevention)  Description: Assess:  --Assess extremities for adequate circulation and sensation     Bed Management:  -Have minimal linens on bed & keep smooth, unwrinkled  -Change linens as needed when moist or perspiring  -  Toileting:  -Offer bedside commode  -  Activity:  -  Skin Care:  -Avoid use of baby powder, tape, friction and shearing, hot water or constrictive clothing  -R-Do not massage red bony areas    Outcome: Progressing  Goal: Incision(s), wounds(s) or drain site(s) healing without S/S of infection  Description: INTERVENTIONS  - Assess and document dressing, incision, wound bed, drain sites and surrounding tissue  - Provide patient and family education  -Outcome: Progressing     Problem: Prexisting or High Potential for Compromised Skin Integrity  Goal: Skin integrity is maintained or improved  Description: INTERVENTIONS:  - Identify patients at risk for skin breakdown  - Assess and monitor skin integrity  - Assess and monitor nutrition and hydration status  - Monitor labs   - Assess for incontinence   - Turn and reposition patient  - Assist with mobility/ambulation  - Relieve pressure over bony prominences  - Avoid friction and shearing  - Provide appropriate hygiene as needed including keeping skin clean and dry  - Evaluate need for skin moisturizer/barrier cream  - Collaborate with interdisciplinary team   - Patient/family teaching  - Consider wound care consult   Outcome: Progressing     Problem: Potential for Falls  Goal: Patient will remain free of falls  Description: INTERVENTIONS:  - Educate patient/family on patient safety including physical limitations  - Instruct patient to call for assistance with activity   - Consult OT/PT to assist with strengthening/mobility   - Keep Call bell within reach  - Keep bed low and locked with side rails adjusted as appropriate  - Keep care items and personal belongings within reach  - Initiate and maintain comfort rounds  - Make Fall Risk Sign visible to staff  - Apply yellow socks and bracelet for high fall risk patients  - Consider moving patient to room near nurses station  Outcome: Progressing

## 2021-06-25 NOTE — NURSING NOTE
Transport information    Transfer to [address] [facility],   ICU room 11  SLETS w/ nurse track @ 03:30      Call for report @ [phone]  Admitting Dr Angelo Civil

## 2021-06-25 NOTE — ASSESSMENT & PLAN NOTE
· Diverticulitis complicated by abscess, nonhealing myocutaneous fistula despite medication intervention and drain  · On 6/21 came for sigmoid colon resection, left salpingectomy with splenic flexure mobilization  · Intact and clean midline incision, left wound packed with minimal serous drainage on dressing  Minimal expected tenderness     · Post op imaging showed some free air expected post Sx and small ascites  · Percocet and zofran available prn but has not required doses since arrival to ICU

## 2021-06-25 NOTE — H&P
Jodie 43 1960, 62 y o  female MRN: [id number]  Unit/Bed#: ICU 11 Encounter: [id number]  Primary Care Provider: Cecy Hooper DO   Date and time admitted to hospital: 6/25/2021  4:38 AM    * Acute respiratory failure with hypoxia [facility])  Assessment & Plan  · S/p sigmoid resection colon resection  Was admitted to [place] where she was noted to have fatigue from work of breathing and placed on BiPAP  S/p solumedrol, lasix , and stopped IVF  · Transferred to [address] [place] [address] for acute resp failure   · Likely mucus plugging vs pleural effusion vs pneumonia likely HCAP   · On exam no wheezing or overload noted so only placed on albuterol, no steroids or lasix given  · Imaging shows atelectasis, pleural effusion, emphysematous changes   · CTA PE negative  · Placed on cefepime, flagl, vanc   · Continue albuterol   · Patient unable to cough due to abdominal pain, vest therapy? Nicotine dependence  Assessment & Plan  · Smokes 1 ppd  · Continue nicotine patch 24 hr qd    Perforation of sigmoid colon due to diverticulitis  Assessment & Plan  · Diverticulitis complicated by abscess nonhealing with medication intervention and drain  · On 6/21 came for sigmoid colon resection, left salpingectomy with splenic flexure mobilization  · Intact and clean midline incision, left covered  Minimal expected tenderness  · Post op imaging showed some free air expected post Sx and small ascites  · Percocet prn  · zofran prn    Depression  Assessment & Plan  Resume sertraline when can tolerate more PO     ----------------------------------------------------------------------------------------  · History: 62year old female with colonfistutla complicated by abscess not better with medical management  Admitted to [place] and for surgical intervention  Subsequently developed increased work of breathing requiring BipAP  S/p IV solumedrol and lasix and IVF  Suggested to dc IVF   Transferred to [facility] for acute resp failure  ·  S/p sigmoid resection colon resection  Was admitted to [place] where she was noted to have fatigue from work of breathing and placed on BiPAP  S/p solumedrol, lasix , and stopped IVF  · [facility]   · Likely mucus plugging vs pleural effusion vs pneumonia likely HCAP     HPI/24hr events:     Patient on NC, up to Midflow @10 L due to desaturation to 84%    Disposition: Transfer to Stepdown Level 1   Code Status: Level 1 - Full Code  ---------------------------------------------------------------------------------------  SUBJECTIVE  Patient feels fine  Some SOB still persistent  Minimal abdominal pain especially when coughing  Never intubated before, no 02 requirements in the past  Coughing up yellow/green/red  Review of Systems  Review of systems was reviewed and negative unless stated above in HPI/24-hour events   ---------------------------------------------------------------------------------------  OBJECTIVE    Vitals   Vitals:    21 0615 21 0700 21 0749 21 0754   BP: 108/82      BP Location:       Pulse: 84      Resp: 21      Temp:  97 6 °F (36 4 °C)     TempSrc:  Temporal     SpO2: 98%  96% 96%   Weight:       Height:         Temp (24hrs), Av 3 °F (36 3 °C), Min:96 5 °F (35 8 °C), Max:97 7 °F (36 5 °C)  Current: Temperature: 97 6 °F (36 4 °C)          Respiratory:  SpO2: SpO2: 96 %, SpO2 Activity: SpO2 Activity: At Rest, SpO2 Device: O2 Device: Mid flow nasal cannula, Capnography:    Nasal Cannula O2 Flow Rate (L/min): 15 L/min    Invasive/non-invasive ventilation settings   Respiratory    Lab Data (Last 4 hours)    None         O2/Vent Data (Last 4 hours)       0425          Non-Invasive Ventilation Mode BiPAP                   Physical Exam  Vitals and nursing note reviewed  Constitutional:       General: She is not in acute distress  Appearance: She is well-developed  Comments:  On 6 L desatting to 84%  HENT:      Head: Normocephalic and atraumatic  Eyes:      Conjunctiva/sclera: Conjunctivae normal    Cardiovascular:      Rate and Rhythm: Normal rate and regular rhythm  Heart sounds: No murmur heard  Pulmonary:      Effort: Pulmonary effort is normal  No respiratory distress  Breath sounds: Normal breath sounds  Abdominal:      Palpations: Abdomen is soft  Tenderness: There is no abdominal tenderness  Musculoskeletal:      Cervical back: Neck supple  Skin:     General: Skin is warm and dry  Neurological:      Mental Status: She is alert     Psychiatric:         Mood and Affect: Mood normal          Laboratory and Diagnostics:  Results from last 7 days   Lab Units 06/25/21  0459 06/24/21  0532 06/23/21  0454 06/22/21  1602 06/22/21  0512 06/21/21  1640   WBC Thousand/uL 5 51 8 20 8 80 8 20 4 40* 5 70   HEMOGLOBIN g/dL 12 4 12 0 12 4 10 2* 5 6* 8 1*   HEMATOCRIT % 36 3 34 3* 36 3 28 8* 15 9* 23 8*   PLATELETS Thousands/uL 215 208 182 176 142* 236   NEUTROS PCT %  --   --   --   --  87* 85*   BANDS PCT % 3 3  --  7  --   --    MONOS PCT %  --   --   --   --  4 7   MONO PCT % 2* 1  --  1  --   --      Results from last 7 days   Lab Units 06/25/21  0459 06/24/21  0532 06/23/21  0454 06/22/21  1602 06/22/21  0509 06/21/21  1640   SODIUM mmol/L 139 137 131* 134* 131* 135*   POTASSIUM mmol/L 4 1 3 2* 3 6 3 5* 3 8 2 9*   CHLORIDE mmol/L 106 105 104 106 108 105   CO2 mmol/L 22 24 20* 24 18* 24   ANION GAP mmol/L 11 8 7 4* 5 6   BUN mg/dL 21 21 15 15 9 11   CREATININE mg/dL 0 98 0 78 0 74 0 73 0 45* 0 59*   CALCIUM mg/dL 8 2* 8 4 8 3* 8 5 7 0* 7 8*   GLUCOSE RANDOM mg/dL 91 103* 116* 93 64* 120*   ALT U/L 24  --  13  --   --  15   AST U/L 57*  --  73*  --   --  31   ALK PHOS U/L 54  --  44  --   --  42*   ALBUMIN g/dL 1 9*  --  2 4*  --   --  2 0*   TOTAL BILIRUBIN mg/dL 0 62  --  0 48  --   --  0 26     Results from last 7 days   Lab Units 06/25/21  0459 06/23/21  0454 MAGNESIUM mg/dL 2 2 1 4*   PHOSPHORUS mg/dL  --  3 2      Results from last 7 days   Lab Units 06/22/21  1602   INR  1 75*   PTT seconds 54*  64*      Results from last 7 days   Lab Units 06/23/21  1140 06/23/21  0833 06/23/21  0529   TROPONIN I ng/mL 2 41* 2 88* 2 97*     Results from last 7 days   Lab Units 06/23/21  1140   LACTIC ACID mmol/L 1 3     ABG:    VBG:          Micro  Results from last 7 days   Lab Units 06/23/21  1609 06/23/21  0832 06/23/21  0831   BLOOD CULTURE   --  No Growth at 24 hrs  No Growth at 24 hrs  SPUTUM CULTURE  Culture too young- will reincubate  --   --    GRAM STAIN RESULT  1+ Epithelial cells per low power field*  No polys seen*  Rare Gram negative rods*  Rare Gram positive rods*  --   --        EKG: No new EKG today  Imaging: I have personally reviewed pertinent reports  Intake and Output  I/O       06/23 0701 - 06/24 0700 06/24 0701 - 06/25 0700 06/25 0701 - 06/26 0700    Urine (mL/kg/hr)  600     Total Output  600     Net  -600                  Height and Weights   Height: 5' 2 5" (158 8 cm)  IBW (Ideal Body Weight): 51 25 kg  Body mass index is 21 78 kg/m²  Weight (last 2 days)     Date/Time   Weight    06/25/21 0450   54 9 (121 03)                Nutrition       Diet Orders   (From admission, onward)             Start     Ordered    06/25/21 0449  Diet Clear Liquid  Effective tomorrow     Question Answer Comment   Diet Type Clear Liquid    RD to adjust diet per protocol?  No        06/25/21 0448                  Active Medications  Scheduled Meds:  Current Facility-Administered Medications   Medication Dose Route Frequency Provider Last Rate    albuterol  2 5 mg Nebulization Q6H LAMONTE Villalobos      cefepime  2,000 mg Intravenous Q12H LAMONTE Villalobos      heparin (porcine)  5,000 Units Subcutaneous Q8H [address] LAMONTE Villalobos      metroNIDAZOLE  500 mg Intravenous Q8H Bianca Rojas MD      nicotine  14 mg Transdermal Daily LAMONTE Villalobos      ondansetron  4 mg Intravenous Q8H PRN Sherryn Ip, CRNP      oxyCODONE-acetaminophen  1 tablet Oral Q4H PRN Sherryn Ip, CRNP      pantoprazole  40 mg Intravenous Q24H [address] Sherryn Ip, CRNP      sodium chloride  4 mL Nebulization Q6H Sherryn Ip, CRNP      vancomycin  12 5 mg/kg Intravenous Q12H Sherryn Ip, CRNP       Continuous Infusions:     PRN Meds:   ondansetron, 4 mg, Q8H PRN  oxyCODONE-acetaminophen, 1 tablet, Q4H PRN        Invasive Devices Review  Invasive Devices     Peripheral Intravenous Line            Peripheral IV 06/24/21 Left;Upper Arm <1 day    Peripheral IV 06/25/21 Left; Lower Arm <1 day              ---------------------------------------------------------------------------------------  Advance Directive and Living Will:      Power of :    POLST:    ---------------------------------------------------------------------------------------  Care Time Delivered:   No Critical Care time spent       Jemal Wood MD      Portions of the record may have been created with voice recognition software  Occasional wrong word or "sound a like" substitutions may have occurred due to the inherent limitations of voice recognition software    Read the chart carefully and recognize, using context, where substitutions have occurred

## 2021-06-25 NOTE — PROGRESS NOTES
Pt transported via [facility]/nurse track to [facility], on BiPap, denies discomfort, all belongings sent with pt   Report given to [facility]

## 2021-06-25 NOTE — PLAN OF CARE
Problem: MOBILITY - ADULT  Goal: Maintain or return to baseline ADL function  Description: INTERVENTIONS:  -  Assess patient's ability to carry out ADLs; assess patient's baseline for ADL function and identify physical deficits which impact ability to perform ADLs (bathing, care of mouth/teeth, toileting, grooming, dressing, etc )  - Assess/evaluate cause of self-care deficits   - Assess range of motion  - Assess patient's mobility; develop plan if impaired  - Assess patient's need for assistive devices and provide as appropriate  - Encourage maximum independence but intervene and supervise when necessary  - Involve family in performance of ADLs  - Assess for home care needs following discharge   - Consider OT consult to assist with ADL evaluation and planning for discharge  - Provide patient education as appropriate  Outcome: Progressing  Goal: Maintains/Returns to pre admission functional level  Description: INTERVENTIONS:  - Perform BMAT or MOVE assessment daily    - Set and communicate daily mobility goal to care team and patient/family/caregiver     - Collaborate with rehabilitation services on mobility goals if consulted  - Record patient progress and toleration of activity level   Outcome: Progressing

## 2021-06-25 NOTE — ASSESSMENT & PLAN NOTE
· S/p sigmoid resection colon resection  Was admitted to [place] where she was noted to have fatigue from work of breathing and placed on BiPAP  S/p solumedrol, lasix , and stopped IVF  · Transferred to [facility] for acute resp failure   · Likely mucus plugging vs pleural effusion vs pneumonia likely HCAP   · On exam no wheezing or overload noted so only placed on albuterol, no steroids or lasix given  · Imaging shows atelectasis / RLL collapse  · Placed on cefepime, flagl, vanc   · Continue albuterol   · Patient unable to cough due to abdominal pain, vest therapy?

## 2021-06-25 NOTE — DISCHARGE SUMMARY
[facility]  Discharge- Stefan Kramer 1960, 62 y o  female MRN: [id number]  Unit/Bed#: 7T [facility] 705-01 Encounter: [id number]  Primary Care Provider: Vladislav Núñez DO   Date and time admitted to hospital: 6/21/2021  7:20 AM    * Acute respiratory failure with hypoxia [facility])  Assessment & Plan  Patient clinically worsening with fatigue from work of breathing and Currently saturating at 94% on BIPAP at 45% at 8/5   - plan for transfer to critical care at [address]  - Case discussed with critical care attending who recommends additional dose of Lasix IV 20 mg and IV Solu-Medrol 125 mg, DC IV fluids and accepted case on as live 1 step down  -PTA for transfer 3:30 a m  06/23/21: Patient in acute respiratory failure with saturation in the low 80s on post-op Day 2 (s/p sigmoid colon resection d/t diverticulitis complicated by abscess)  Likely 2/2 pneumonia vs atelectasis vs pleural effusion or any combination of the aforementioned  CT PE: collapsed right lower lobe, mucus plug at the RLL bronchus, small bilateral pleural effusion, bilateral emphysematous changes, ground-glass opacities/patches at the left lung, and RUL  No evidence of pulmonary embolism  Patient was intiated on emperic abx coverage with Vancomycin 500 mg Q12H, Cefepime 2 g Q12H for suspected pneumonia  Critical care specialist and RT on board  - Consult to ID, recommendations appreciated  Plan is to continue Vancomycin and Cefipime  And d/c Flagyl 500 mg as per IDSA guidelines for pneumonia  - Intention was to have bronchoscopy for mucus plug removal if patient unimproved within next 48hrs  - Follow-up blood and sputum culture  - Continue respiratory protocol  - Repeat CBC, CMP, procal in AM      MI (acute kidney injury) ([facility] [address] )  Assessment & Plan  Creatinine in 6/22 -0 45; creatinine 6/24 - 0 78  Patient satisfies kdigo criteria for acute kidney injury    Possibly Prerenal - given recent abdominal and thoracic infections, Sarah operative NPO status and vancomycin antibiotics to cover for possible MRSA infection  Will de-escalate antibiotics, if not required, once MRSA screen resulted  Patient tolerating clear diet today and receiving 2/3 maintenance IV fluids given hypoxic episodes with clinical signs of pulmonary edema - will discontinue IV fluids at this time  Will reassess renal function with a m  BMP  Electrolyte abnormality  Assessment & Plan  Hypo magnesemia and hypokalemia  Electrolytes repleted today  - plan for a m  BMP    Nicotine dependence  Assessment & Plan  Chronic life long smoker  Possible history of COPD - no formal evaluation done  Patient would benefit from pulmonary function tests    - Continue Nicotine patch    Diverticulitis  Assessment & Plan  Post op Day 4  Surgical wound with no signs of infection at this time  - Continue management as per Primary Surgical Team  - Patient will need close surgical follow-up at transfer facility - Dr Daron Thomas informed of plan for transfer    Mild protein-calorie malnutrition ([facility] [address] )  Assessment & Plan  Malnutrition Findings:   Adult Malnutrition type: Unknown (comment)  Adult Degree of Malnutrition: Malnutrition of mild degree    BMI Findings:  Adult BMI Classifications: Underweight < 18 5     Body mass index is 17 54 kg/m²         Discharging Physician / Practitioner: Dr Elena Hermosillo  PCP: Siri Colmenares DO  Admission Date:   Admission Orders (From admission, onward)     Ordered        06/21/21 1508  Inpatient Admission  Once                   Discharge Date: 06/25/21    Reason for Admission: Diverticulitis - for sigmoidectomy    Discharge Diagnoses:     Principal Problem:    Acute respiratory failure with hypoxia ([address] )  Active Problems:    Electrolyte abnormality    MI (acute kidney injury) ([facility] [address] )    Nicotine dependence    Anxiety    Hyperlipidemia    Colocutaneous fistula    Perforation of sigmoid colon due to diverticulitis    Mild protein-calorie malnutrition ([facility] [address] )    Diverticulitis  Resolved Problems:    * No resolved hospital problems  *      Consultations During Hospital Stay:  · Family Medicine    Procedures Performed:   · Sigmoidectomy with primary anastomosis    Significant Findings / Test Results:   · MI (creatinine increase 0 45-0 78 within 48 hours)  · CT PE-small bilateral pleural effusion with left lower lobe lung collapse suggestive of atelectasis versus pneumonia  No pulmonary embolism present  · Hypokalemia, hypomagnesemia    Incidental Findings:   · None     Test Results Pending at Discharge (will require follow up): · Blood culture and sputum culture     Outpatient Tests Requested:  · None    Outpatient follow-up Requested:   Patient would benefit from pulmonary follow-up with pulmonary function test to assess for COPD    Complications:  Acute respiratory failure on day 2 postop secondary to atelectasis (mucus plugging) versus pneumonia    Hospital Course:     Gwyn Brock is a 62 y o  female patient who originally presented to the hospital on 6/21/2021 due to diverticulitis with paracolic abscess development  Patient given IV antibiotics as outpatient and admitted for definitive surgical therapy of sigmoidectomy with primary anastomosis on 06/21/2021  Patient noted to have significant adhesions and difficult dissection during surgery with blood loss amounting to fall in hemoglobin from 8 to 5 6  Patient given 3 units packed red blood cells postoperatively with improvement of hemoglobin to 10  On day 2 postop, patient noted to have desaturations down to mid 70s requiring 8 L oxygen by nasal cannula  Patient assessed and investigated for pulmonary embolism which was ruled out with CT PE  However investigation also demonstrated collapse right lower lobe, mucus plugging, small bilateral pleural effusion and suggestions of possible pneumonia    Patient was given IV Lasix 20 mg, IV methylprednisone 40 mg, IV ceftriaxone and azithromycin x1 dose and respiratory therapy instituted  Patient improved throughout to the day of 6/23/21  Antibiotics converted to IV cefepime and vancomycin and 1 day of Flagyl which was deescalated by Infectious Disease at consultation  On day 3 postop, patient was tolerating clear diet and appeared to be stable throughout the day  However, patient had worsening fatigue throughout the evening of 06/24/2021  Evaluation at 10:00 p m  Demonstrated bilateral crepitations up to mid zone, Lasix IV 20 mg given  Patient requiring 12 L oxygen via nasal cannula however saturations not improving over 84%  Respiratory treatment instituted, patient given rebreather bag on top of nasal cannula while receiving 100% oxygen at 15 L  Oxygen saturation improving up to 100% however patient fatigue worsening with patient minimally verbally responsive on stimulation  GCS E3 V5 M6   Primary team General surgery informed of patient's status and agreed with FP recommendation to have patient transferred to [facility] for critical care management  Condition at Discharge: serious     Discharge Day Visit / Exam:     Vitals: Blood Pressure: 116/87 (06/24/21 2359)  Pulse: 89 (06/24/21 2359)  Temperature: (!) 96 9 °F (36 1 °C) (06/24/21 2359)  Temp Source: Temporal (06/24/21 2359)  Respirations: 20 (06/24/21 2359)  Height: 5' 3" (160 cm) (06/22/21 1648)  Weight - Scale: 44 9 kg (99 lb) (06/22/21 1648)  SpO2: 93 % (06/25/21 0034)  Exam:   Physical Exam  Vitals (Patient on BIPAP ventilation) reviewed  Constitutional:       General: She is in acute distress  Appearance: She is ill-appearing  HENT:      Head: Normocephalic  Cardiovascular:      Rate and Rhythm: Normal rate  Pulses: Normal pulses  Heart sounds: Normal heart sounds  No murmur heard  No gallop  Pulmonary:      Effort: Respiratory distress present  Breath sounds: Examination of the right-lower field reveals rales   Examination of the left-lower field reveals rales  Rales present  No wheezing or rhonchi  Abdominal:      General: Abdomen is flat  There is no distension  Tenderness: There is no abdominal tenderness  Musculoskeletal:      Right lower leg: No edema  Left lower leg: No edema  Neurological:      General: No focal deficit present  Discussion with Family:  Patient asked for family to be informed in the mornings due to the time of evaluation being 1am      Discharge Medications:  See AVS    Provisions for Follow-Up Care:  See after visit summary for information related to follow-up care and any pertinent home health orders  Disposition:     [address] SNF:   · Not Applicable to this Patient - Not Applicable to this Patient    Planned Readmission: At transfer facility-[facility]     Discharge Statement:  I spent 60 minutes discharging the patient  This time was spent on the day of discharge  I had direct contact with the patient on the day of discharge  Greater than 50% of the total time was spent examining patient, answering all patient questions, arranging and discussing plan of care with patient as well as directly providing post-discharge instructions  Additional time then spent on discharge activities      ** Please Note: This note has been constructed using a voice recognition system **    Mely Castellanos MD  06/25/21  1:57 AM

## 2021-06-25 NOTE — RESPIRATORY THERAPY NOTE
Pt transitioned from 6L NC to 15L Midflow due to desats  Tolerating MF well with Sat 92%  Will continue to monitor and wean O2 as tolerated

## 2021-06-25 NOTE — PROGRESS NOTES
Vancomycin IV Pharmacy-to-Dose Consultation    Danielle Cooper is a 62 y o  female who is currently receiving Vancomycin IV with management by the Pharmacy Consult service  Assessment/Plan:  The patient was reviewed  Renal function is stable and no signs or symptoms of nephrotoxicity and/or infusion reactions were documented in the chart  Potential Nephrotoxicity Factors:  · Medications: diuretics  · Patient Factors: elderly    Based on todays assessment, change current vancomycin (day # 3) dosing to 1,000 mg q24h, with a plan for trough to be drawn at 1030 on 6/29  We will continue to follow the patients culture results and clinical progress daily      Leia Richard, PharmD, [address] [facility] and Internal Medicine Clinical Pharmacist  [phone] or via [employer]

## 2021-06-25 NOTE — QUICK NOTE
Patient reviewed  Sitting up in bed receiving 12 L oxygen via mid flow nasal cannula  Does not appear to be in obvious pulmonary distress however does report feeling generally weak with no change in her subjective respiratory status  Her nurse reports to me that oxygen saturation dropped to 79%  Respiratory maneuvers done to improve saturation to 84% and oxygen momentarily increased to 14 L  Chest auscultation reveals crackles bilaterally anteriorly and posteriorly up to mid zone  Air entry decreased to bilateral bases  No pedal edema  Will continue oxygen to achieve oxygen saturation of 90%  Will give IV Lasix 20 mg  Will decrease IV fluids to 50 cc/hour (continued due to acute kidney injury demonstrated on elevation in creatinine from 0 4 -> 0 7 in the last 48 hours prior to this morning's labs)  Will review patient in 1 hour

## 2021-06-25 NOTE — PLAN OF CARE
Problem: PHYSICAL THERAPY ADULT  Goal: Performs mobility at highest level of function for planned discharge setting  See evaluation for individualized goals  Description: Treatment/Interventions: Functional transfer training, LE strengthening/ROM, Therapeutic exercise, Elevations, Endurance training, Patient/family training, Equipment eval/education, Bed mobility, Gait training, Compensatory technique education, Continued evaluation, Spoke to nursing, OT  Equipment Recommended: Karo Castellanos (will continue to evaluate need for DME)       See flowsheet documentation for full assessment, interventions and recommendations  Note: Prognosis: Good  Problem List: Decreased strength, Decreased endurance, Impaired balance, Decreased mobility, Impaired judgement  Assessment: Melo Medellin is a 62 y o  female admitted to [facility] on 6/25/2021 for Acute respiratory failure with hypoxia ([facility] [address] )  PT was consulted and pt was seen on 6/25/2021 for mobility assessment and d/c planning  Pt presents w medium fall risk, multiple lines  Pt is currently functioning at a supervision assistance x1 level for bed mobility, minimum assistance x1 level for transfers and ambulation w RW  Presents w generalized weakness and easily fatigued limiting activity tolerance  Require RW for support and min A for safety  Pt will benefit from continued skilled IP PT to address the above mentioned impairments  in order to maximize recovery and increase functional independence when completing mobility and ADLs  Currently PT recommendations for DME include RW  At this time PT recommendations for d/c are STR given environmental barriers, ?support from daughter given other familial responsibilities, fall risk and decline in function  If pt to dc home, would recommend increased support and HHPT    Barriers to Discharge: Inaccessible home environment        PT Discharge Recommendation: Post acute rehabilitation services     PT - OK to Discharge: Yes (to rehab)    See flowsheet documentation for full assessment

## 2021-06-25 NOTE — RESPIRATORY THERAPY NOTE
06/25/21 0034   Respiratory Assessment   Resp Comments Pt received on [address] 12L, decompensation noted with increased wob and decreased responsiveness, mfnc flow increased to 15L  SPO2 on 15L [address] [facility] noted 79%  pt placed for a short time on MFNC+NRB before deciding on BIPAP on documented settings  Pt tolerating well, spo2 increased to 93% on fiO2 45%     Non-Invasive Information   O2 Interface Device Face mask   Non-Invasive Ventilation Mode BiPAP   $ Continous NIV Initial   SpO2 93 %   $ Pulse Oximetry Spot Check Charge Completed   Non-Invasive Settings   FiO2 (%) 45   IPAP (cm) 8 cm   EPAP (cm) 5 cm   Rate (Set) 12   Rise Time 3   Inspiratory Time (Set) 1   Non-Invasive Readings   Skin Intervention Skin intact   Total Rate 23   Vt (mL) (Mech) 653   MV (Mech) 15   Peak Pressure (Obs) 8   Leak (lpm) 19   Non-Invasive Alarms   Insp Pressure High (cm H20) 30   Insp Pressure Low (cm H20) 5   Low Insp Pressure Time (sec) 20 sec   MV Low (L/min) 3   Vt High (mL) 900   Vt Low (mL) 300   High Resp Rate (BPM) 40 BPM   Low Resp Rate (BPM) 8 BPM

## 2021-06-25 NOTE — PROGRESS NOTES
Progress Note - General Surgery  : SLA Surgery Resident on Nehemiah Oliva 62 y o  female MRN: [id number]  Unit/Bed#: ICU 6 Encounter: [id number]    Assessment:  62 y o  female POD5 from sigmoidectomy & fistula takedown, primary anastamosis  Admitted to [facility] for HF exacerbation      Plan:  Advanced diet to surg soft  Monitor bowel function  Care per ICU    Subjective/Objective     Subjective: No complaints, denies bowel function but wants solid food      Objective:     Physical Exam:  GEN: NAD  HEENT: MMM  CV: RRR  Lung: Normal effort  Ab: Soft, ND, mildly appropriately tender near incision, LLQ fistula takedown site packed with gauze with serous output  Extrem: No CCE   Neuro: A+Ox3     Vitals: Temp:  [96 9 °F (36 1 °C)-98 4 °F (36 9 °C)] 98 4 °F (36 9 °C)  HR:  [74-96] 74  Resp:  [15-30] 17  BP: ()/(57-94) 95/67  Body mass index is 21 78 kg/m²  I/O       06/24 0701 - 06/25 0700 06/25 0701 - 06/26 0700    IV Piggyback  500    Total Intake(mL/kg)  500 (9 1)    Urine (mL/kg/hr) 600 850 (1 3)    Total Output 600 850    Net -600 -350                  Lab, Imaging and other studies: I have personally reviewed pertinent reports    , CBC with diff:   Lab Results   Component Value Date    WBC 5 51 06/25/2021    HGB 12 4 06/25/2021    HCT 36 3 06/25/2021     (H) 06/25/2021     06/25/2021    MCH 34 0 06/25/2021    MCHC 34 2 06/25/2021    RDW 19 0 (H) 06/25/2021    MPV 9 5 06/25/2021    NRBC 0 06/25/2021   , BMP/CMP:   Lab Results   Component Value Date    SODIUM 139 06/25/2021    K 4 1 06/25/2021     06/25/2021    CO2 22 06/25/2021    BUN 21 06/25/2021    CREATININE 0 98 06/25/2021    CALCIUM 8 2 (L) 06/25/2021    AST 57 (H) 06/25/2021    ALT 24 06/25/2021    ALKPHOS 54 06/25/2021    EGFR 63 06/25/2021     VTE Pharmacologic Prophylaxis: Heparin  VTE Mechanical Prophylaxis: sequential compression device      Britni Ramirez MD  6/25/2021 7:18 PM

## 2021-06-25 NOTE — QUICK NOTE
Patient reviewed  Looking far more fatigued with decreased respiratory effort  Crackles still present in lung field however passing good urine (3 episodes in past hour)  Respiratory at bedside-instituted 100% oxygen via nasal cannula and rebreather bag simultaneously at 15 L  Oxygen saturation improved with this from 84% to 100%  However, patient appears to be very tired, barely verbally responsive and air movement decreased throughout lung fields due to poor effort  We will institute BiPAP to decrease work of breathing  We will contact primary team-general surgery to discuss transfer for critical care at [facility]

## 2021-06-25 NOTE — PROGRESS NOTES
Progress Note - Infectious Disease   Marlene Lindsay 62 y o  female MRN: [id number]  Unit/Bed#: ICU 6 Encounter: [id number]      Impression/Plan:    81-year-old female patient with acute hypoxic respiratory failure noted on postop day 2 for  open resection of sigmoid colon, left salpingectomy         1- Acute hypoxic respiratory failure:  Developed on postop day 2 after open sigmoid colon resection and left salpingectomy;   Postop day 4 today, now on mid flow nasal cannula 15 liters/minute  Patient is chronic smoker, now with elevated troponin  CT chest x-ray suggesting mucus plugging with possible atelectasis or pneumonia  Patient afebrile, PCT 2 4  Sputum culture with mixed respiratory penelope; Flu, RSV, COVID-19 PCR negative  - suspect respiratory failure multifactorial   Concern for pulmonary edema in setting of elevated troponin  Concern for mucous plugging with subsequent atelectasis  Concern for pneumonia  -  sputum culture with mixed respiratory penelope  No MRSA   -  stop vancomycin, continue cefepime  - repeat CBC with differential, CMP, procalcitonin in a m   - close pulmonary follow-up  Encouraged patient to continue using spirometer and flutter valve  If no improvement, consider bronchoscopy to remove mucus plugs       2- Suspected pneumonia:  Possibly participating in problem 1  Patient likely with limited inspiratory effort due to her abdominal pain and recent surgery, causing mucus plugging, atelectasis, and possible associated pneumonia  - antibiotics as above  - if no  patient will need bronchoscopy to remove mucus plugging  Discussed with Pulmonary team     3- complicated diverticulitis:  With recurrent abscess and colocutaneous fistula  Now postop day 4 for open resection of sigmoid colon, left salpingectomy  Operative report reviewed, extensive phlegmonous changes and adhesions noted  Surgical wound with no signs of infection at this time    - frequent abdominal exams  - pain control as per primary service team and General surgery  - close general surgery follow-up     4- suspected COPD:  Patient with chronic smoking  Pulmonary evaluation reviewed, currently off steroids  - consider avoiding systemic corticosteroid treatment in setting of recent extensive surgical procedure as mentioned above  - close pulmonary team follow-up     5- nicotine dependence:  Stressed need for complete smoking cessation after hospital discharge  6- troponin elevation:  Possible non MI troponin elevation setting of acute hypoxic respiratory failure  - continue close monitoring of hemodynamics status  - cardiology follow-up     Have discussed the above management plan in detail with the ICU team  Plan mentioned above discussed with patient    Antibiotics:  Vancomycin day 3  Cefepime day 3    Subjective:  Denies fever or chills  Reports mild abdominal discomfort, but denies pain, denies nausea or vomiting  Still has poor appetite  Reports some improvement in her respiratory status today, she is using flutter valve and spirometer, and reports producing clear phlegm    Objective:  Vitals:  Temp:  [96 5 °F (35 8 °C)-97 8 °F (36 6 °C)] 97 8 °F (36 6 °C)  HR:  [84-96] 88  Resp:  [15-34] 34  BP: (107-142)/(72-94) 115/75  SpO2:  [79 %-99 %] 92 %  Temp (24hrs), Av 4 °F (36 3 °C), Min:96 5 °F (35 8 °C), Max:97 8 °F (36 6 °C)  Current: Temperature: 97 8 °F (36 6 °C)    Physical Exam:   General Appearance:  Alert, interactive, nontoxic but remains in respiratory failure, on 15 liters/minute mid flow nasal cannula   Throat: Oropharynx moist without lesions  Lungs:   Decreased air entry at mid and lower lung fields bilaterally   Heart:  RRR; no murmur, rub or gallop   Abdomen:   Soft, non-tender, non-distended, positive bowel sounds  Incision with no signs of dehiscence or drainage    Very small amount of serous drainage from site of previous colocutaneous fistula   Extremities: No clubbing, cyanosis    Skin: No new rashes or lesions  No draining wounds noted  Labs, Imaging, & Other studies:   All pertinent labs and imaging studies were personally reviewed  Results from last 7 days   Lab Units 06/25/21  0459 06/24/21  0532 06/23/21  0454   WBC Thousand/uL 5 51 8 20 8 80   HEMOGLOBIN g/dL 12 4 12 0 12 4   PLATELETS Thousands/uL 215 208 182     Results from last 7 days   Lab Units 06/25/21  0459 06/24/21  0532 06/23/21  0454 06/21/21  1640   SODIUM mmol/L 139 137 131* 135*   POTASSIUM mmol/L 4 1 3 2* 3 6 2 9*   CHLORIDE mmol/L 106 105 104 105   CO2 mmol/L 22 24 20* 24   BUN mg/dL 21 21 15 11   CREATININE mg/dL 0 98 0 78 0 74 0 59*   EGFR ml/min/1 73sq m 63 83 88 100   CALCIUM mg/dL 8 2* 8 4 8 3* 7 8*   AST U/L 57*  --  73* 31   ALT U/L 24  --  13 15   ALK PHOS U/L 54  --  44 42*     Results from last 7 days   Lab Units 06/23/21  1609 06/23/21  0832 06/23/21  0831   BLOOD CULTURE   --  No Growth at 24 hrs  No Growth at 24 hrs     SPUTUM CULTURE  3+ Growth of Candida albicans*  3+ Growth of   --   --    GRAM STAIN RESULT  1+ Epithelial cells per low power field*  No polys seen*  Rare Gram negative rods*  Rare Gram positive rods*  --   --      Results from last 7 days   Lab Units 06/25/21  0500   PROCALCITONIN ng/ml 2 47*

## 2021-06-25 NOTE — ASSESSMENT & PLAN NOTE
· Diverticulitis complicated by abscess nonhealing with medication intervention and drain  · On 6/21 came for sigmoid colon resection, left salpingectomy with splenic flexure mobilization  · Intact and clean midline incision, left covered  Minimal expected tenderness     · Percocet prn  · zofran prn

## 2021-06-25 NOTE — ASSESSMENT & PLAN NOTE
· S/p sigmoid resection colon resection  Was admitted to [place] where she was noted to have fatigue from work of breathing and placed on BiPAP  S/p solumedrol, lasix , and stopped IVF  · Transferred to [facility] 6/25 for acute resp failure, initially on BiPap then tolerated mid-flow  HS bipap 14/6 70% overnight  · Likely mucus plugging vs pleural effusion vs pneumonia likely HCAP   · On exam no wheezing or overload noted so only placed on albuterol, no steroids or lasix given  · Imaging shows atelectasis, pleural effusion, emphysematous changes   · CTA PE negative  · Placed on cefepime, flagl, vanc  Consider d/c Vanco once MRSA back as sputum culture does not appear to be growing MRSA  Final results pending  · Continue albuterol   · Patient unable to cough due to abdominal pain, vest therapy?

## 2021-06-25 NOTE — PLAN OF CARE
Problem: MOBILITY - ADULT  Goal: Maintain or return to baseline ADL function  Description: INTERVENTIONS:  -  Assess patient's ability to carry out ADLs; assess patient's baseline for ADL function and identify physical deficits which impact ability to perform ADLs (bathing, care of mouth/teeth, toileting, grooming, dressing, etc )  - Assess/evaluate cause of self-care deficits   - Assess range of motion  - Assess patient's mobility; develop plan if impaired  - Assess patient's need for assistive devices and provide as appropriate  - Encourage maximum independence but intervene and supervise when necessary  - Involve family in performance of ADLs  - Assess for home care needs following discharge   - Consider OT consult to assist with ADL evaluation and planning for discharge  - Provide patient education as appropriate  Outcome: Progressing  Goal: Maintains/Returns to pre admission functional level  Description: INTERVENTIONS:  - Perform BMAT or MOVE assessment daily    - Set and communicate daily mobility goal to care team and patient/family/caregiver  - Collaborate with rehabilitation services on mobility goals if consulted  - Perform Range of Motion 4 times a day  - Reposition patient every 2 hours    - Dangle patient 3 times a day  - Stand patient 3 times a day  - Ambulate patient 2 times a day  - Out of bed to chair 3 times a day   - Out of bed for meals 3 times a day  - Out of bed for toileting  - Record patient progress and toleration of activity level   Outcome: Progressing     Problem: Potential for Falls  Goal: Patient will remain free of falls  Description: INTERVENTIONS:  - Educate patient/family on patient safety including physical limitations  - Instruct patient to call for assistance with activity   - Consult OT/PT to assist with strengthening/mobility   - Keep Call bell within reach  - Keep bed low and locked with side rails adjusted as appropriate  - Keep care items and personal belongings within reach  - Initiate and maintain comfort rounds  - Make Fall Risk Sign visible to staff  - Offer Toileting every 2 Hours, in advance of need  - Initiate/Maintain bed alarm  - Obtain necessary fall risk management equipment  - Apply yellow socks and bracelet for high fall risk patients  - Consider moving patient to room near nurses station  Outcome: Progressing     Problem: Prexisting or High Potential for Compromised Skin Integrity  Goal: Skin integrity is maintained or improved  Description: INTERVENTIONS:  - Identify patients at risk for skin breakdown  - Assess and monitor skin integrity  - Assess and monitor nutrition and hydration status  - Monitor labs   - Assess for incontinence   - Turn and reposition patient  - Assist with mobility/ambulation  - Relieve pressure over bony prominences  - Avoid friction and shearing  - Provide appropriate hygiene as needed including keeping skin clean and dry  - Evaluate need for skin moisturizer/barrier cream  - Collaborate with interdisciplinary team   - Patient/family teaching  - Consider wound care consult   Outcome: Progressing     Problem: RESPIRATORY - ADULT  Goal: Achieves optimal ventilation and oxygenation  Description: INTERVENTIONS:  - Assess for changes in respiratory status  - Assess for changes in mentation and behavior  - Position to facilitate oxygenation and minimize respiratory effort  - Oxygen administered by appropriate delivery if ordered  - Initiate smoking cessation education as indicated  - Encourage broncho-pulmonary hygiene including cough, deep breathe, Incentive Spirometry  - Assess the need for suctioning and aspirate as needed  - Assess and instruct to report SOB or any respiratory difficulty  - Respiratory Therapy support as indicated  Outcome: Progressing     Problem: GASTROINTESTINAL - ADULT  Goal: Minimal or absence of nausea and/or vomiting  Description: INTERVENTIONS:  - Administer IV fluids if ordered to ensure adequate hydration  - Maintain NPO status until nausea and vomiting are resolved  - Administer ordered antiemetic medications as needed  - Provide nonpharmacologic comfort measures as appropriate  - Advance diet as tolerated, if ordered  - Consider nutrition services referral to assist patient with adequate nutrition and appropriate food choices  Outcome: Progressing  Goal: Maintains or returns to baseline bowel function  Description: INTERVENTIONS:  - Assess bowel function  - Encourage oral fluids to ensure adequate hydration  - Administer IV fluids if ordered to ensure adequate hydration  - Administer ordered medications as needed  - Encourage mobilization and activity  - Consider nutritional services referral to assist patient with adequate nutrition and appropriate food choices  Outcome: Progressing  Goal: Maintains adequate nutritional intake  Description: INTERVENTIONS:  - Monitor percentage of each meal consumed  - Identify factors contributing to decreased intake, treat as appropriate  - Assist with meals as needed  - Monitor I&O, weight, and lab values if indicated  - Obtain nutrition services referral as needed  Outcome: Progressing     Problem: METABOLIC, FLUID AND ELECTROLYTES - ADULT  Goal: Fluid balance maintained  Description: INTERVENTIONS:  - Monitor labs   - Monitor I/O and WT  - Instruct patient on fluid and nutrition as appropriate  - Assess for signs & symptoms of volume excess or deficit  Outcome: Progressing     Problem: SKIN/TISSUE INTEGRITY - ADULT  Goal: Incision(s), wounds(s) or drain site(s) healing without S/S of infection  Description: INTERVENTIONS  - Assess and document dressing, incision, wound bed, drain sites and surrounding tissue  - Provide patient and family education  - Perform skin care/dressing changes   Outcome: Progressing     Problem: HEMATOLOGIC - ADULT  Goal: Maintains hematologic stability  Description: INTERVENTIONS  - Assess for signs and symptoms of bleeding or hemorrhage  - Monitor labs  - Administer supportive blood products/factors as ordered and appropriate  Outcome: Progressing

## 2021-06-25 NOTE — ASSESSMENT & PLAN NOTE
Creatinine in 6/22 -0 45; creatinine 6/24 - 0 78  Patient satisfies kdigo criteria for acute kidney injury  Possibly Prerenal - given recent abdominal and thoracic infections, Sarah operative NPO status and vancomycin antibiotics to cover for possible MRSA infection  Will de-escalate antibiotics, if not required, once MRSA screen resulted  Patient tolerating clear diet today and receiving 2/3 maintenance IV fluids given hypoxic episodes with clinical signs of pulmonary edema - will discontinue IV fluids at this time  Will reassess renal function with yaakov ABREU

## 2021-06-25 NOTE — CONSULTS
Consultation - General Surgery  Marlene Johnson 62 y o  female MRN: [id number]  Unit/Bed#: ICU 11 Encounter: [id number]        Assessment/Plan     Assessment:  62 y o  female w/ complicated diverticulitis and colocutaneous fistula   s/p sigmoidectomy, colocutaneous fistula takedown and primary anastomosis at [facility] on 6/21    Transferred for ICU level care given respiratory difficulty and fluid overload on BiPAP      Plan:  Care per ICU  Surgically the patient is recovering well, abdomen is soft  Clear Liquid Diet, will advance as bowel function returns  Monitor colocutaneous fistula takedown site    Antibiotics per ICU  Plan for bronchoscopy per ICU    History of Present Illness     HPI:  Fatmata Norwood is a 62 y o  female who presents as a transfer from [facility] for ICU level care after colorectal surgery  The patient was doing well post operatively but then had a low hemoglobin and was transfused 3U pRBC and had fluids placed  She has COPD and began having breathing issues and signs of CHF  Chest CT did show possible mucus plug  She tolerated clears but has no bowel function yet  Review of Systems   Constitutional: Negative  HENT: Negative  Eyes: Negative  Respiratory: Negative  Cardiovascular: Negative  Gastrointestinal: Negative  Endocrine: Negative  Genitourinary: Negative  Musculoskeletal: Negative  Skin: Negative  Allergic/Immunologic: Negative  Neurological: Negative  Hematological: Negative  Psychiatric/Behavioral: Negative          Historical Information   Past Medical History:   Diagnosis Date    Anxiety     Hyperlipidemia      Past Surgical History:   Procedure Laterality Date    FISTULA REPAIR Left     Lower abdomen    [place] LAP,SURG,COLECTOMY, PARTIAL, W/ANAST N/A 6/21/2021    Procedure: RESECTION COLON SIGMOID LAPAROSCOPIC (attempted), open resection sigmoid colon, left salpingectomy;  Surgeon: Makenzie Blackwell MD;  Location:  MAIN OR;  Service: General     Social History   Social History     Substance and Sexual Activity   Alcohol Use Yes    Alcohol/week: 2 0 standard drinks    Types: 1 Cans of beer, 1 Shots of liquor per week     Social History     Substance and Sexual Activity   Drug Use Never     Social History     Tobacco Use   Smoking Status Current Every Day Smoker    Packs/day: 1 00    Types: Cigarettes   Smokeless Tobacco Never Used     Family History: No family history on file  Meds/Allergies   PTA meds:   Prior to Admission Medications   Prescriptions Last Dose Informant Patient Reported? Taking?   ibuprofen (MOTRIN) 200 mg tablet Past Week at Unknown time Self Yes Yes   Sig: Take 200 mg by mouth every 6 (six) hours as needed   sertraline (ZOLOFT) 50 mg tablet Past Week at Unknown time Self Yes Yes   Sig: Take 100 mg by mouth daily      Facility-Administered Medications: None     No Known Allergies    Objective   First Vitals:   Blood Pressure: 107/72 (06/25/21 0450)  Pulse: 84 (06/25/21 0450)  Temperature: 97 7 °F (36 5 °C) (06/25/21 0450)  Temp Source: Temporal (06/25/21 0450)  Respirations: 15 (06/25/21 0450)  Height: 5' 2 5" (158 8 cm) (06/25/21 0450)  Weight - Scale: 54 9 kg (121 lb 0 5 oz) (06/25/21 0450)  SpO2: 99 % (06/25/21 0450)    Current Vitals:   Blood Pressure: 108/82 (06/25/21 0615)  Pulse: 84 (06/25/21 0615)  Temperature: 97 6 °F (36 4 °C) (06/25/21 0700)  Temp Source: Temporal (06/25/21 0700)  Respirations: 21 (06/25/21 0615)  Height: 5' 2 5" (158 8 cm) (06/25/21 0450)  Weight - Scale: 54 9 kg (121 lb 0 5 oz) (06/25/21 0450)  SpO2: 98 % (06/25/21 0615)      Intake/Output Summary (Last 24 hours) at 6/25/2021 0741  Last data filed at 6/25/2021 5152  Gross per 24 hour   Intake --   Output 600 ml   Net -600 ml       Invasive Devices     Peripheral Intravenous Line            Peripheral IV 06/24/21 Left;Upper Arm <1 day    Peripheral IV 06/25/21 Left; Lower Arm <1 day                Physical Exam  Constitutional:       General: She is not in acute distress  Appearance: She is ill-appearing  She is not toxic-appearing  HENT:      Head: Normocephalic and atraumatic  Nose: Nose normal       Mouth/Throat:      Mouth: Mucous membranes are dry  Eyes:      General: No scleral icterus  Cardiovascular:      Rate and Rhythm: Normal rate and regular rhythm  Pulmonary:      Comments: On BIPAP  Abdominal:      General: There is distension (mild)  Palpations: Abdomen is soft  Tenderness: There is abdominal tenderness (appropriate post operatively)  There is no guarding  Comments: Colocutaneous fistula site open on abdomen   Musculoskeletal:         General: Normal range of motion  Cervical back: Neck supple  Skin:     General: Skin is warm  Neurological:      General: No focal deficit present  Mental Status: She is alert and oriented to person, place, and time  Psychiatric:         Mood and Affect: Mood normal          Lab Results:   I have personally reviewed pertinent lab results  , CBC:   Lab Results   Component Value Date    WBC 5 51 06/25/2021    HGB 12 4 06/25/2021    HCT 36 3 06/25/2021     (H) 06/25/2021     06/25/2021    MCH 34 0 06/25/2021    MCHC 34 2 06/25/2021    RDW 19 0 (H) 06/25/2021    MPV 9 5 06/25/2021    NRBC 0 06/25/2021   , CMP:   Lab Results   Component Value Date    SODIUM 139 06/25/2021    K 4 1 06/25/2021     06/25/2021    CO2 22 06/25/2021    BUN 21 06/25/2021    CREATININE 0 98 06/25/2021    CALCIUM 8 2 (L) 06/25/2021    AST 57 (H) 06/25/2021    ALT 24 06/25/2021    ALKPHOS 54 06/25/2021    EGFR 63 06/25/2021     Imaging: I have personally reviewed pertinent reports  and I have personally reviewed pertinent films in PACS  EKG, Pathology, and Other Studies: I have personally reviewed pertinent reports     and I have personally reviewed pertinent films in PACS    Code Status: Level 1 - Full Code  Advance Directive and Living Will:      Power of : POLST:

## 2021-06-25 NOTE — ASSESSMENT & PLAN NOTE
Malnutrition Findings:   Adult Malnutrition type: Unknown (comment)  Adult Degree of Malnutrition: Malnutrition of mild degree    BMI Findings:  Adult BMI Classifications: Underweight < 18 5     Body mass index is 17 54 kg/m²

## 2021-06-25 NOTE — OCCUPATIONAL THERAPY NOTE
Occupational Therapy Evaluation(time=3360-1704)     Patient Name: Mitch Pearl  Today's Date: 6/25/2021  Problem List  Principal Problem:    Acute respiratory failure with hypoxia ([address] )  Active Problems:    Depression    Perforation of sigmoid colon due to diverticulitis    Nicotine dependence    Past Medical History  Past Medical History:   Diagnosis Date    Anxiety     Hyperlipidemia      Past Surgical History  Past Surgical History:   Procedure Laterality Date    FISTULA REPAIR Left     Lower abdomen    [place] LAP,SURG,COLECTOMY, PARTIAL, W/ANAST N/A 6/21/2021    Procedure: RESECTION COLON SIGMOID LAPAROSCOPIC (attempted), open resection sigmoid colon, left salpingectomy;  Surgeon: Amandeep Campbell MD;  Location: [facility] MAIN OR;  Service: General             06/25/21 4967   Note Type   Note type Evaluation   Restrictions/Precautions   Weight Bearing Precautions Per Order No   Other Precautions Fall Risk;O2;Telemetry;Multiple lines   Pain Assessment   Pain Assessment Tool FLACC   Pain Rating: FLACC (Rest) - Face 0   Pain Rating: FLACC (Rest) - Legs 0   Pain Rating: FLACC (Rest) - Activity 0   Pain Rating: FLACC (Rest) - Cry 1   Pain Rating: FLACC (Rest) - Consolability 0   Score: FLACC (Rest) 1   Home Living   Type of Home House   Home Layout Multi-level   Bathroom Shower/Tub Walk-in shower   Bathroom Toilet Standard   Home Equipment   (denies)   Prior Function   Lives With Spouse;Daughter  (grandchildren)   ADL Assistance Independent   Falls in the last 6 months 1 to 4  (2)   Comments PTA pt states independence with her ADLs, transfers, ambulation--w/o device; +, neg home alone, +falls=2   Lifestyle   Autonomy PTA pt states independence with her ADLs, transfers, ambulation--w/o device; +, neg home alone, +falls=2   Reciprocal Relationships dtr   Service to Others works at a [employer] X   Patient Behaviors/Mood Flat affect; Cooperative   Subjective   Subjective "I haven't had a lot of energy lately "   ADL   Where Assessed Edge of bed   Eating Assistance 6  Modified independent   Grooming Assistance 6  Modified Independent   UB Bathing Assistance 5  Supervision/Setup   LB Bathing Assistance 4  Minimal Assistance   UB Dressing Assistance 5  Supervision/Setup   LB Dressing Assistance 4  Minimal Assistance   Bed Mobility   Rolling R 5  Supervision   Rolling L 5  Supervision   Supine to Sit 5  Supervision   Transfers   Sit to Stand 4  Minimal assistance   Additional items Assist x 1; Increased time required;Verbal cues   Stand to Sit 4  Minimal assistance   Additional items Assist x 1; Increased time required;Verbal cues   Functional Mobility   Functional Mobility 4  Minimal assistance   Additional Comments x1   Additional items Rolling walker   Balance   Static Sitting Good   Dynamic Sitting Fair +   Static Standing Fair   Dynamic Standing Fair -   Activity Tolerance   Activity Tolerance Patient limited by fatigue   Medical Staff Made Aware nsg, P T     RUE Assessment   RUE Assessment WFL   RUE Strength   RUE Overall Strength Within Functional Limits - able to perform ADL tasks with strength  (4/5 throughout)   LUE Assessment   LUE Assessment X  (limited shr AROM(i e flex=90*);elbow-distal=WFLs)   LUE Strength   LUE Overall Strength Within Functional Limits - able to perform ADL tasks with strength  (shr=3+/5, elbow-distal=4/5)   Hand Function   Gross Motor Coordination Functional   Fine Motor Coordination Functional   Sensation   Light Touch No apparent deficits   Proprioception   Proprioception No apparent deficits   Vision-Basic Assessment   Current Vision   (glasses)   Vision - Complex Assessment   Acuity Able to read clock/calendar on wall without difficulty   Perception   Inattention/Neglect Appears intact   Cognition   Overall Cognitive Status WFL   Arousal/Participation Alert   Attention Within functional limits   Orientation Level Oriented X4   Memory Within functional limits   Following Commands Follows all commands and directions without difficulty   Assessment   Limitation Decreased ADL status; Decreased UE ROM; Decreased UE strength;Decreased Safe judgement during ADL;Decreased endurance;Decreased high-level ADLs   Prognosis Good   Assessment Pt is a 56y/o female admitted to the hospital(x-jamil from [facility]) 2* acute respiratory failure with hypoxia  Pt with recent hx(6/21/21) s/p sigmoid colon resection, lap, L salpingectomy 2* perforation  Pt with PMH anxiety and tobacco abuse  PTA pt states independence with her ADLs, transfers, ambulation--w/o device; +, neg home alone, +falls=2  During initial eval, pt demonstrated deficits with functional balance, functional mobility, ADL status, transfer safety, b/l UE strength, and activity tolerance(currently fair=15-20mins)  Pt would benefit from continued OT tx for the above deficits  3-5xwk/1-2wks   Goals   Patient Goals "to be able to go home"   STG Time Frame   (1-7 days)   Short Term Goal #1 Pt will demonstrate improved activity tolerance to good(20-30mins) and standing tolerance to 3-5mins to assist with ADLs  Short Term Goal #2 Pt will demonstrate proper walker/transfer safety 100% of the time  Short Term Goal  Pt will independently demonstrate knowledge and application of proper energy conservation techniques 100% of the time  LTG Time Frame   (7-14 days)   Long Term Goal #1 Pt will demonstrate g/g- balance with all functional activities  Long Term Goal #2 Pt will demonstrate mod I with their UE and LE bathing/dresssing  Long Term Goal Pt will demonstrate improved b/l UE strength by 1/2 MM grade to assist with ADLs/transfers  Plan   Treatment Interventions ADL retraining;Functional transfer training;UE strengthening/ROM; Endurance training;Patient/family training; Compensatory technique education   Goal Expiration Date 07/09/21   OT Treatment Day 0   OT Frequency 3-5x/wk   Recommendation   OT Discharge Recommendation Post acute rehabilitation services   Equipment Recommended Bedside commode  (RW)   AM-PAC Daily Activity Inpatient   Lower Body Dressing 3   Bathing 3   Toileting 3   Upper Body Dressing 3   Grooming 4   Eating 4   Daily Activity Raw Score 20   Daily Activity Standardized Score (Calc for Raw Score >=11) 42 03   AM-PAC Applied Cognition Inpatient   Following a Speech/Presentation 4   Understanding Ordinary Conversation 4   Taking Medications 4   Remembering Where Things Are Placed or Put Away 4   Remembering List of 4-5 Errands 4   Taking Care of Complicated Tasks 3   Applied Cognition Raw Score 23   Applied Cognition Standardized Score 53 08   Juan Reveles, OT

## 2021-06-25 NOTE — PLAN OF CARE
Problem: OCCUPATIONAL THERAPY ADULT  Goal: Performs self-care activities at highest level of function for planned discharge setting  See evaluation for individualized goals  Description: Treatment Interventions: ADL retraining, Functional transfer training, UE strengthening/ROM, Endurance training, Patient/family training, Compensatory technique education  Equipment Recommended: Bedside commode (RW)       See flowsheet documentation for full assessment, interventions and recommendations  Note: Limitation: Decreased ADL status, Decreased UE ROM, Decreased UE strength, Decreased Safe judgement during ADL, Decreased endurance, Decreased high-level ADLs  Prognosis: Good  Assessment: Pt is a 58y/o female admitted to the hospital(x-jamil from [facility]) 2* acute respiratory failure with hypoxia  Pt with recent hx(6/21/21) s/p sigmoid colon resection, lap, L salpingectomy 2* perforation  Pt with PMH anxiety and tobacco abuse  PTA pt states independence with her ADLs, transfers, ambulation--w/o device; +, neg home alone, +falls=2  During initial eval, pt demonstrated deficits with functional balance, functional mobility, ADL status, transfer safety, b/l UE strength, and activity tolerance(currently fair=15-20mins)  Pt would benefit from continued OT tx for the above deficits   3-5xwk/1-2wks     OT Discharge Recommendation: Post acute rehabilitation services

## 2021-06-25 NOTE — PHYSICAL THERAPY NOTE
PHYSICAL THERAPY EVALUATION          Patient Name: Criss Martinez  EVRQD'D Date: 6/25/2021   PT EVALUATION    62 y o     [id number]    Respiratory distress [R06 03]    Past Medical History:   Diagnosis Date    Anxiety     Hyperlipidemia      Past Surgical History:   Procedure Laterality Date    FISTULA REPAIR Left     Lower abdomen    [place] LAP,SURG,COLECTOMY, PARTIAL, W/ANAST N/A 6/21/2021    Procedure: RESECTION COLON SIGMOID LAPAROSCOPIC (attempted), open resection sigmoid colon, left salpingectomy;  Surgeon: Gita Connelly MD;  Location: [address] [place] [address] OR;  Service: General        06/25/21 1144   PT Last Visit   PT Visit Date 06/25/21   Note Type   Note type Evaluation   Pain Assessment   Pain Assessment Tool Pain Assessment not indicated - pt denies pain   Pain Score No Pain   Home Living   Type of [address] [place] [address] Multi-level;1/2 bath on main level;Bed/bath upstairs;Stairs to enter with rails   Bathroom Shower/Tub Walk-in shower   Bathroom Toilet Standard   Additional Comments 4 ROSS w bl HR  sleeps on first floor but goes upstairs for shower   Prior Function   Level of [place] Independent with ADLs and functional mobility   Lives With Spouse; Family;Daughter  (grandkids age 4-9 yo (1, 9, 6, 8))   ADL Assistance Independent   IADLs Independent   Falls in the last 6 months 1 to 4   Vocational Part time employment  (recently went from full time> part time)   Comments indep pta without an AD  +  lives w family, spouse still works but dtr home to take care of grandkids  +  reports unlimited ambulator   Restrictions/Precautions   Other Precautions Multiple lines;Telemetry;O2;Fall Risk  (15L midflow)   General   Additional Pertinent History pt admitted 6/25/21 for acute respiratory failure w hypoxia  prev at [address] [place] [address] from 6/21-25/21, underwent colon resection 6/21  transf to  for acute respiratory failure w need for critical care   activity as tolerated orders   Cognition   Overall Cognitive Status WFL   Arousal/Participation Cooperative   Attention Within functional limits   Orientation Level Oriented X4   Memory Within functional limits   Following Commands Follows all commands and directions without difficulty   RLE Assessment   RLE Assessment WFL  (grossly 3+ to 4-)   LLE Assessment   LLE Assessment WFL  (grossly 3+ to 4-)   Coordination   Sensation WFL   Bed Mobility   Supine to Sit 5  Supervision   Additional items HOB elevated; Bedrails; Increased time required   Transfers   Sit to Stand 4  Minimal assistance   Additional items Assist x 1; Increased time required; Other  (RW)   Stand to Sit 4  Minimal assistance   Additional items Assist x 1; Armrests; Increased time required; Other;Verbal cues  (RW)   Ambulation/Elevation   Gait pattern Short stride; Excessively slow   Gait Assistance 4  Minimal assist   Additional items Assist x 1   Assistive Device Rolling walker   Distance 2' bed>chair   Balance   Dynamic Sitting Fair +   Static Standing Fair -   Dynamic Standing Poor +   Ambulatory Poor +   Endurance Deficit   Endurance Deficit Yes   Endurance Deficit Description reports generally fatigued and feels weaker  vitals during session on 15L midflow: 92/63, 90, 91% supine  96/65, 87, 94% at EOB after two mins  110/68, 87, 93% post session   Activity Tolerance   Activity Tolerance Treatment limited secondary to medical complications (Comment); Patient limited by fatigue   Medical Staff Made Aware OT   Nurse Made Aware cleared for therapy   Assessment   Prognosis Good   Problem List Decreased strength;Decreased endurance; Impaired balance;Decreased mobility; Impaired judgement   Assessment Marlene Vasquez is a 62 y o  female admitted to [facility] on 6/25/2021 for Acute respiratory failure with hypoxia ([facility] [address] )  PT was consulted and pt was seen on 6/25/2021 for mobility assessment and d/c planning  Pt presents w medium fall risk, multiple lines   Pt is currently functioning at a supervision assistance x1 level for bed mobility, minimum assistance x1 level for transfers and ambulation w RW  Presents w generalized weakness and easily fatigued limiting activity tolerance  Require RW for support and min A for safety  Pt will benefit from continued skilled IP PT to address the above mentioned impairments  in order to maximize recovery and increase functional independence when completing mobility and ADLs  Currently PT recommendations for DME include RW  At this time PT recommendations for d/c are STR given environmental barriers, ?support from daughter given other familial responsibilities, fall risk and decline in function  If pt to dc home, would recommend increased support and HHPT  Barriers to Discharge Inaccessible home environment   Goals   Patient Goals feel better/stronger and go home   STG Expiration Date 07/09/21   Short Term Goal #1 1)  Pt will perform bed mobility with Juan demonstrating appropriate technique 100% of the time in order to improve function  2)  Perform all transfers with Juan demonstrating safe and appropriate technique 100% of the time in order to improve ability to negotiate safely in home environment  3) Amb with least restrictive AD > 50'x1 with mod I in order to demonstrate ability to negotiate in home environment  4)  Improve overall strength and balance 1/2 grade in order to optimize ability to perform functional tasks and reduce fall risk  5) Increase activity tolerance to 45 minutes in order to improve endurance to functional tasks  6)  Negotiate stairs using most appropriate technique and S in order to be able to negotiate safely in home environment  7) PT for ongoing patient and family/caregiver education, DME needs and d/c planning in order to promote highest level of function in least restrictive environment  PT Treatment Day 0   Plan   Treatment/Interventions Functional transfer training;LE strengthening/ROM; Therapeutic exercise;Elevations; Endurance training;Patient/family training;Equipment eval/education; Bed mobility;Gait training; Compensatory technique education;Continued evaluation;Spoke to nursing;OT   PT Frequency Other (Comment)  (4-5x)   Recommendation   PT Discharge Recommendation Post acute rehabilitation services   Equipment Recommended Paulette Bernal  (will continue to evaluate need for DME)   Walker Package Recommended Wheeled walker   Change/add to [employer]? No   PT - OK to Discharge Yes  (to rehab)   Additional Comments The patient's AM-PAC Basic Mobility Inpatient Short Form Raw Score is 16, Standardized Score is 38 32  A standardized score less than 42 9 suggests the patient may benefit from discharge to post-acute rehabilitation services  Please also refer to the recommendation of the Physical Therapist for safe discharge planning     AM-PAC Basic Mobility Inpatient   Turning in Bed Without Bedrails 3   Lying on Back to Sitting on Edge of Flat Bed 3   Moving Bed to Chair 3   Standing Up From Chair 3   Walk in Room 3   Climb 3-5 Stairs 1   Basic Mobility Inpatient Raw Score 16   Basic Mobility Standardized Score 38 32   History: co - morbidities, age, social background,  fall risk, multiple lines  Exam: impairments in systems including musculoskeletal (strength), neuromuscular (balance, gait, transfers, motor function and sensation), am-pac, cardiopulmonary, cognition  Clinical: unstable/unpredictable  Complexity:high      Jenny Rico, PT

## 2021-06-25 NOTE — PLAN OF CARE
Problem: PHYSICAL THERAPY ADULT  Goal: Performs mobility at highest level of function for planned discharge setting  See evaluation for individualized goals  Description: Treatment/Interventions: ADL retraining, Elevations, LE strengthening/ROM, Functional transfer training, Therapeutic exercise, Endurance training, Patient/family training, Equipment eval/education, Bed mobility, Gait training, Spoke to nursing, Spoke to case management, OT  Equipment Recommended: Donnell Zuniga       See flowsheet documentation for full assessment, interventions and recommendations  6/25/2021 0656 by Franco Cardenas PT  Note: Prognosis: Fair  Problem List: Decreased strength, Decreased endurance, Impaired balance, Decreased mobility, Decreased skin integrity     Barriers to Discharge: Inaccessible home environment, Decreased caregiver support        PT Discharge Recommendation: Post acute rehabilitation services          See flowsheet documentation for full assessment  6/25/2021 0655 by Franco Cardenas PT  Note: Prognosis: Fair  Problem List: Decreased strength, Decreased endurance, Impaired balance, Decreased mobility, Decreased skin integrity     Barriers to Discharge: Inaccessible home environment, Decreased caregiver support        PT Discharge Recommendation: Post acute rehabilitation services          See flowsheet documentation for full assessment

## 2021-06-25 NOTE — EMTALA/ACUTE CARE TRANSFER
[address] [facility] 7 [facility] SURGICAL UNIT  [address] [facility] [id number] [phone]  Dept: [phone]      ACUTE CARE TRANSFER CONSENT    NAME Nalini VALLEJO 1960                              MRN [id number]    I have been informed of my rights regarding examination, treatment, and transfer   by Dr Radha Singh MD    Benefits:      Risks:        Transfer Request:  I acknowledge that my medical condition has been evaluated and explained to me by the treating physician or other qualified medical person and/or my attending physician who has recommended and offered to me further medical examination and treatment  I understand the Hospital's obligation with respect to the treatment and stabilization of my medical condition  I nevertheless request to be transferred  I release the Hospital, the doctor, and any other persons caring for me from all responsibility or liability for any injury or ill effects that may result from my transfer and agree to accept all responsibility for the consequences of my choice to transfer, rather than receive stabilizing treatment at the Hospital  I understand that because the transfer is my request, my insurance may not provide reimbursement for the services  The Hospital will assist and direct me and my family in how to make arrangements for transfer, but the hospital is not liable for any fees charged by the transport service  In spite of this understanding, I refuse to consent to further medical examination and treatment which has been offered to me, and request transfer to    I authorize the performance of emergency medical procedures and treatments upon me in both transit and upon arrival at the receiving facility  Additionally, I authorize the release of any and all medical records to the receiving facility and request they be transported with me, if possible      I authorize the performance of emergency medical procedures and treatments upon me in both transit and upon arrival at the receiving facility  Additionally, I authorize the release of any and all medical records to the receiving facility and request they be transported with me, if possible  I understand that the safest mode of transportation during a medical emergency is an ambulance and that the Hospital advocates the use of this mode of transport  Risks of traveling to the receiving facility by car, including absence of medical control, life sustaining equipment, such as oxygen, and medical personnel has been explained to me and I fully understand them  (PIPPA CORRECT BOX BELOW)  [ x ]  I consent to the stated transfer and to be transported by ambulance/helicopter  [    ]  I consent to the stated transfer, but refuse transportation by ambulance and accept full responsibility for my transportation by car  I understand the risks of non-ambulance transfers and I exonerate the Hospital and its staff from any deterioration in my condition that results from this refusal     X___________________________________________    DATE  21  TIME________  Signature of patient or legally responsible individual signing on patient behalf           RELATIONSHIP TO PATIENT_________________________          Provider Certification    NAME Elena Henriquez                                        [facility] 1960                              MRN [id number]    A medical screening exam was performed on the above named patient    Based on the examination:    Condition Necessitating Transfer Acute Respiratory Failure    Patient Condition:  Serious / Critical    Reason for Transfer:  Requiring higher level of care    Transfer Requirements: [address]  · Space available and qualified personnel available for treatment as acknowledged by  PACS  · Agreed to accept transfer and to provide appropriate medical treatment as acknowledged by Dr Lolly Ayala · Appropriate medical records of the examination and treatment of the patient are provided at the time of transfer   [address] [facility], [address] _______  · Transfer will be performed by qualified personnel from  [facility] and appropriate transfer equipment as required, including the use of necessary and appropriate life support measures  Provider Certification: I have examined the patient and explained the following risks and benefits of being transferred/refusing transfer to the patient/family:         Based on these reasonable risks and benefits to the patient and/or the unborn child(janette), and based upon the information available at the time of the patients examination, I certify that the medical benefits reasonably to be expected from the provision of appropriate medical treatments at another medical facility outweigh the increasing risks, if any, to the individuals medical condition, and in the case of labor to the unborn child, from effecting the transfer      X____Mely Castellanos MD________________________________________ DATE 06/25/21        TIME____1:00am___      ORIGINAL - SEND TO MEDICAL RECORDS   COPY - SEND WITH PATIENT DURING TRANSFER

## 2021-06-25 NOTE — QUICK NOTE
luisa was called  Explained that she was still requiring oxygen which could be multifactorial  Explained that she will not require bronchoscopy at this time since CXR showed that the right lung expanded  He will be visiting her later today

## 2021-06-26 PROBLEM — E44.0 MODERATE PROTEIN-CALORIE MALNUTRITION (HCC): Status: ACTIVE | Noted: 2021-06-23

## 2021-06-26 LAB
ALBUMIN SERPL BCP-MCNC: 1.6 G/DL (ref 3.5–5)
ALP SERPL-CCNC: 49 U/L (ref 46–116)
ALT SERPL W P-5'-P-CCNC: 20 U/L (ref 12–78)
ANION GAP SERPL CALCULATED.3IONS-SCNC: 11 MMOL/L (ref 4–13)
AST SERPL W P-5'-P-CCNC: 32 U/L (ref 5–45)
BACTERIA SPT RESP CULT: ABNORMAL
BACTERIA SPT RESP CULT: ABNORMAL
BASOPHILS # BLD MANUAL: 0 THOUSAND/UL (ref 0–0.1)
BASOPHILS NFR MAR MANUAL: 0 % (ref 0–1)
BILIRUB SERPL-MCNC: 0.55 MG/DL (ref 0.2–1)
BUN SERPL-MCNC: 28 MG/DL (ref 5–25)
BURR CELLS BLD QL SMEAR: PRESENT
CALCIUM ALBUM COR SERPL-MCNC: 10 MG/DL (ref 8.3–10.1)
CALCIUM SERPL-MCNC: 8.1 MG/DL (ref 8.3–10.1)
CHLORIDE SERPL-SCNC: 105 MMOL/L (ref 100–108)
CO2 SERPL-SCNC: 22 MMOL/L (ref 21–32)
CREAT SERPL-MCNC: 1.06 MG/DL (ref 0.6–1.3)
EOSINOPHIL # BLD MANUAL: 0 THOUSAND/UL (ref 0–0.4)
EOSINOPHIL NFR BLD MANUAL: 0 % (ref 0–6)
ERYTHROCYTE [DISTWIDTH] IN BLOOD BY AUTOMATED COUNT: 19 % (ref 11.6–15.1)
GFR SERPL CREATININE-BSD FRML MDRD: 57 ML/MIN/1.73SQ M
GLUCOSE SERPL-MCNC: 100 MG/DL (ref 65–140)
GLUCOSE SERPL-MCNC: 112 MG/DL (ref 65–140)
GLUCOSE SERPL-MCNC: 133 MG/DL (ref 65–140)
GLUCOSE SERPL-MCNC: 133 MG/DL (ref 65–140)
GLUCOSE SERPL-MCNC: 97 MG/DL (ref 65–140)
GRAM STN SPEC: ABNORMAL
HCT VFR BLD AUTO: 35 % (ref 34.8–46.1)
HELMET CELLS BLD QL SMEAR: PRESENT
HGB BLD-MCNC: 11.9 G/DL (ref 11.5–15.4)
LYMPHOCYTES # BLD AUTO: 0.74 THOUSAND/UL (ref 0.6–4.47)
LYMPHOCYTES # BLD AUTO: 11 % (ref 14–44)
MAGNESIUM SERPL-MCNC: 1.9 MG/DL (ref 1.6–2.6)
MCH RBC QN AUTO: 33.7 PG (ref 26.8–34.3)
MCHC RBC AUTO-ENTMCNC: 34 G/DL (ref 31.4–37.4)
MCV RBC AUTO: 99 FL (ref 82–98)
MONOCYTES # BLD AUTO: 0.07 THOUSAND/UL (ref 0–1.22)
MONOCYTES NFR BLD: 1 % (ref 4–12)
MRSA NOSE QL CULT: NORMAL
NEUTROPHILS # BLD MANUAL: 5.9 THOUSAND/UL (ref 1.85–7.62)
NEUTS SEG NFR BLD AUTO: 88 % (ref 43–75)
NRBC BLD AUTO-RTO: 0 /100 WBCS
PHOSPHATE SERPL-MCNC: 5.1 MG/DL (ref 2.3–4.1)
PLATELET # BLD AUTO: 215 THOUSANDS/UL (ref 149–390)
PLATELET BLD QL SMEAR: ADEQUATE
PLATELET CLUMP BLD QL SMEAR: PRESENT
PMV BLD AUTO: 9.1 FL (ref 8.9–12.7)
POTASSIUM SERPL-SCNC: 4 MMOL/L (ref 3.5–5.3)
PROCALCITONIN SERPL-MCNC: 2.06 NG/ML
PROT SERPL-MCNC: 5.5 G/DL (ref 6.4–8.2)
RBC # BLD AUTO: 3.53 MILLION/UL (ref 3.81–5.12)
SODIUM SERPL-SCNC: 138 MMOL/L (ref 136–145)
TOTAL CELLS COUNTED SPEC: 100
WBC # BLD AUTO: 6.7 THOUSAND/UL (ref 4.31–10.16)

## 2021-06-26 PROCEDURE — 84145 PROCALCITONIN (PCT): CPT | Performed by: NURSE PRACTITIONER

## 2021-06-26 PROCEDURE — 99232 SBSQ HOSP IP/OBS MODERATE 35: CPT | Performed by: INTERNAL MEDICINE

## 2021-06-26 PROCEDURE — 84100 ASSAY OF PHOSPHORUS: CPT | Performed by: NURSE PRACTITIONER

## 2021-06-26 PROCEDURE — 85007 BL SMEAR W/DIFF WBC COUNT: CPT | Performed by: NURSE PRACTITIONER

## 2021-06-26 PROCEDURE — 94660 CPAP INITIATION&MGMT: CPT

## 2021-06-26 PROCEDURE — 99024 POSTOP FOLLOW-UP VISIT: CPT | Performed by: SURGERY

## 2021-06-26 PROCEDURE — 82948 REAGENT STRIP/BLOOD GLUCOSE: CPT

## 2021-06-26 PROCEDURE — 80053 COMPREHEN METABOLIC PANEL: CPT | Performed by: NURSE PRACTITIONER

## 2021-06-26 PROCEDURE — 83735 ASSAY OF MAGNESIUM: CPT | Performed by: NURSE PRACTITIONER

## 2021-06-26 PROCEDURE — 94640 AIRWAY INHALATION TREATMENT: CPT

## 2021-06-26 PROCEDURE — 85027 COMPLETE CBC AUTOMATED: CPT | Performed by: NURSE PRACTITIONER

## 2021-06-26 RX ORDER — BUDESONIDE 0.5 MG/2ML
0.5 INHALANT ORAL
Status: DISCONTINUED | OUTPATIENT
Start: 2021-06-26 | End: 2021-06-30 | Stop reason: HOSPADM

## 2021-06-26 RX ADMIN — IPRATROPIUM BROMIDE AND ALBUTEROL SULFATE 3 ML: 2.5; .5 SOLUTION RESPIRATORY (INHALATION) at 13:32

## 2021-06-26 RX ADMIN — CEFEPIME HYDROCHLORIDE 2000 MG: 2 INJECTION, POWDER, FOR SOLUTION INTRAVENOUS at 22:18

## 2021-06-26 RX ADMIN — OXYCODONE HYDROCHLORIDE 5 MG: 5 TABLET ORAL at 06:13

## 2021-06-26 RX ADMIN — PANTOPRAZOLE SODIUM 40 MG: 40 TABLET, DELAYED RELEASE ORAL at 06:13

## 2021-06-26 RX ADMIN — OXYCODONE HYDROCHLORIDE 5 MG: 5 TABLET ORAL at 17:50

## 2021-06-26 RX ADMIN — Medication 14 MG: at 08:05

## 2021-06-26 RX ADMIN — BUDESONIDE 0.5 MG: 0.5 INHALANT ORAL at 19:16

## 2021-06-26 RX ADMIN — IPRATROPIUM BROMIDE AND ALBUTEROL SULFATE 3 ML: 2.5; .5 SOLUTION RESPIRATORY (INHALATION) at 19:16

## 2021-06-26 RX ADMIN — HEPARIN SODIUM 5000 UNITS: 5000 INJECTION INTRAVENOUS; SUBCUTANEOUS at 22:17

## 2021-06-26 RX ADMIN — SODIUM CHLORIDE SOLN NEBU 3% 4 ML: 3 NEBU SOLN at 07:24

## 2021-06-26 RX ADMIN — PREDNISONE 40 MG: 20 TABLET ORAL at 08:04

## 2021-06-26 RX ADMIN — OXYCODONE HYDROCHLORIDE 5 MG: 5 TABLET ORAL at 11:58

## 2021-06-26 RX ADMIN — SERTRALINE HYDROCHLORIDE 100 MG: 100 TABLET ORAL at 08:03

## 2021-06-26 RX ADMIN — HEPARIN SODIUM 5000 UNITS: 5000 INJECTION INTRAVENOUS; SUBCUTANEOUS at 06:13

## 2021-06-26 RX ADMIN — OXYCODONE HYDROCHLORIDE 5 MG: 5 TABLET ORAL at 23:21

## 2021-06-26 RX ADMIN — SODIUM CHLORIDE SOLN NEBU 3% 4 ML: 3 NEBU SOLN at 01:32

## 2021-06-26 RX ADMIN — IPRATROPIUM BROMIDE AND ALBUTEROL SULFATE 3 ML: 2.5; .5 SOLUTION RESPIRATORY (INHALATION) at 01:32

## 2021-06-26 RX ADMIN — HEPARIN SODIUM 5000 UNITS: 5000 INJECTION INTRAVENOUS; SUBCUTANEOUS at 13:56

## 2021-06-26 RX ADMIN — CEFEPIME HYDROCHLORIDE 2000 MG: 2 INJECTION, POWDER, FOR SOLUTION INTRAVENOUS at 09:55

## 2021-06-26 RX ADMIN — IPRATROPIUM BROMIDE AND ALBUTEROL SULFATE 3 ML: 2.5; .5 SOLUTION RESPIRATORY (INHALATION) at 07:24

## 2021-06-26 NOTE — UTILIZATION REVIEW
Initial Clinical Review  For [facility]  Admission: Date/Time/Statement:   Admission Orders (From admission, onward)     Ordered        06/25/21 0441  Inpatient Admission  Once                   Orders Placed This Encounter   Procedures    Inpatient Admission     Standing Status:   Standing     Number of Occurrences:   1     Order Specific Question:   Level of Care     Answer:   Level 1 Stepdown [13]     Order Specific Question:   Estimated length of stay     Answer:   More than 2 Midnights     Order Specific Question:   Certification     Answer:   I certify that inpatient services are medically necessary for this patient for a duration of greater than two midnights  See H&P and MD Progress Notes for additional information about the patient's course of treatment  Initial Presentation: 63 yo fem w/hx colon fistula transferred from [address] [place] [address] surg unit to [facility] level 1 ICU stepdown, admitted as inpatient due to acute hypoxic resp failure  Presented to  [facility] on 6/21 for diverticulitis with paracolic abscess  She had sigmoidectomy/anastomosis on 6/21 with postop blood loss anemia requiring 3u prbc's  On day 2, she desat into mid 70's requiring 8li o2  She was found to have bilat effusions with poss pneumonia for which IV antbx were started  On day 3, she had worsening fatigue and by 10pm had bilat mid lung crepitations requiring IV diuresis and increased o2 to 12li  Sat at 84%  She was placed on NRB on top of NC, IV steroids, IV diuresis continued, but fatigue worsened and she was minimally responsive  bipap placed  Decision made to transfer to [facility] for higher level of care and critical care management  On arrival to [facility], suspecting mucus plugging vs  Pleural effusion vs  Pneumonia  Remains on 10li midflow o2, 84% sat  Persistently sob, minimal abdominal pain, yellow/green/red productive cough  Has normal breath sounds, abd is soft and nontender  She is feeling better  Surgery consulted  Per surgery: on NC w/o resp distress  Colocutaneous fistula site macerated, but no surrounding cellulitis  Midline incision CDI  Tolerating clears, passed gas  Continue wound care  PM Update: on 10li midflow o2, no acute distress  R base previously had absent breath sounds, there is now moderate air movement  abd has mild diffuse tenderness  No edema  It was thought she would need bronchoscopy; however, her lung has expanded and this is not needed  Date: 6/26   Day 2: (POD#5) suspect aspiration pneumonia and volume overload  tolerating midflow o2, feels improved, slight sputum production, good pain control  Lungs w/L basilar rales, improving rhonchi, no wheezing  abd incision CDI, LLQ fistula with drainage  Stop prednisone, add pulmicort nebs bid  Stop vanco, continue other antbx per ID  Tolerating diet, ok to transfer to med surg  Per ID: feeling better, dyspnea improved, cough is mostly weak and nonproductive  abd pain mild and controlled  Afebrile, no chills  Tolerating antbx  suspect multifactorial resp failure, concern for pulm edema in setting of elevated troponin, concern for mucus plug with atelectasis, concern for pneumonia  Stop vanco, continue cefepime, if no improvement, consider bronch to remove mucus plugs  Suspect that she has COPD due to chronic smoking, she is off steroids-consider avoiding systemic steroids due to recent extensive surgery       Temperature Pulse Respirations Blood Pressure SpO2   06/25/21 0450 06/25/21 0450 06/25/21 0450 06/25/21 0450 06/25/21 0450   97 7 °F (36 5 °C) 84 15 107/72 99 %      Temp Source Heart Rate Source Patient Position - Orthostatic VS BP Location FiO2 (%)   06/25/21 0450 06/25/21 0450 06/25/21 0450 06/25/21 0450 --   Temporal Monitor Lying Right arm       Pain Score       06/25/21 0527       3          Wt Readings from Last 1 Encounters:   06/25/21 54 9 kg (121 lb 0 5 oz)     Additional Vital Signs:   Date/Time  Temp  Pulse  Resp  BP MAP (mmHg)  SpO2  Calculated FIO2 (%) - Nasal Cannula  Nasal Cannula O2 Flow Rate (L/min)  O2 Device     06/26/21 1211  --  --  --  --  --  --  52  8 L/min  Mid flow nasal cannula     06/26/21 1100  97 7 °F (36 5 °C)  --  --  --  --  --  --  --  --     06/26/21 0930  --  82  20  95/62  72  92 %  --  --  --         Date/Time  Temp  Pulse  Resp BP MAP (mmHg) SpO2 Calculated FIO2 (%) - Nasal Cannula  Nasal Cannula O2 Flow Rate (L/min)  O2 Device     06/26/21 0726  --  --  -- -- -- -- 80  15 L/min  Mid flow nasal cannula     06/26/21 0715  --  80  22 105/71 -- 90 % --  --  --     06/26/21 0700  98 7 °F (37 1 °C)  --  -- -- -- -- --  --  --     06/26/21 0540  --  --  -- -- -- 97 % --  --  --     06/26/21 0400  --  --  -- -- -- 97 % --  --  --full face mask     06/26/21 0322  98 °F (36 7 °C)  88  26Abnormal  119/70 91 85 %Abnormal  --  --  --     06/26/21 0220  --  84  18 106/73 85 98 % --  --  --     06/26/21 0132  --  --  -- -- -- 98 % --  --  --     06/26/21 0120  --  74  27Abnormal  89/64Abnormal  71 98 % --  --  --     06/26/21 0020  --  76  27Abnormal  101/67 76 99 % --  --  --     06/26/21 0000  --  --  -- -- -- 99 % --  --  --full face mask     06/25/21 2349  97 8 °F (36 6 °C)  --  -- -- -- -- --  --  --     06/25/21 2320  --  78  32Abnormal  109/74 85 92 % --  --  --     06/25/21 2220  --  80  29Abnormal  96/62 71 92 % --  --  --     06/25/21 2120  --  82  23Abnormal  99/71 85 94 % --  --  --     06/25/21 2020  --  82  18 103/65 76 99 % --  --  --     06/25/21 2002  --  --  -- -- -- 95 % 80  15 L/min  Mid flow nasal cannula     06/25/21 1957  98 5 °F (36 9 °C)  --  -- -- -- -- --  --  --     06/25/21 1920  --  82  34Abnormal  114/75 92 91 % --  --  --     06/25/21 1820  --  74  17 95/67 75 91 % --  --  --     06/25/21 1720  --  78  20 105/70 79 91 % --  --  --     06/25/21 1615  --  82  22 119/78 -- 90 % --  --  --     06/25/21 1520  --  80  22 107/79 94 93 % --  --  --     06/25/21 1500  98 4 °F (36 9 °C)  -- -- -- -- -- --  --  --     06/25/21 1429  --  --  -- -- -- 92 % 80  15 L/min  Mid flow nasal cannula     06/25/21 1420  --  80  20 103/67 78 94 % --  --  --     06/25/21 1320  --  82  19 75/57Abnormal  62 95 % --  --  --     06/25/21 1220  --  82  21 96/64 77 95 % --  --  --     06/25/21 1100  97 8 °F (36 6 °C)  --  -- -- -- -- --  --  --     06/25/21 1040  --  88  20 115/75 89 85 %Abnormal  --  --  --     06/25/21 0820  --  88  22 104/71 89 92 % --  --  --     06/25/21 0754  --  --  -- -- -- 96 % --  --  --     06/25/21 0749  --  --  -- -- -- 96 % 80  15 L/min  Mid flow nasal cannula     06/25/21 0740  --  84  24Abnormal  -- -- 83 %Abnormal   --  --  --     06/25/21 0730  --  86  29Abnormal  -- -- 89 %Abnormal  --  --  --     06/25/21 0725  --  84  17 -- -- 98 % 44  6 L/min  Nasal cannula     06/25/21 0700  97 6 °F (36 4 °C)  --  -- -- -- -- --  --  --     06/25/21 0615  --  84  21 108/82 90 98 % --  --  --     06/25/21 0515  --  86  30Abnormal  129/81 98 98 % --  --  --         Pertinent Labs/Diagnostic Test Results:   6/23 PE study: No pulmonary embolism is seen    Superimposed on mild scattered pulmonary emphysematous changes, there are small bilateral pleural effusions, right greater than left  Attenuation of the bronchus intermedius extending into the right middle and lower lobe bronchi probably related to   mucous plugging, inspissated secretions, and/or aspiration  Associated atelectasis in the bilateral lower lobes as well as the lateral right middle lobe    Patchy alveolar and groundglass opacities in the posterior and peripheral right upper lobe with extensive groundglass and alveolar opacities in the left upper and lower lobes which could represent infection, inflammation, and/or edema (which could be   cardiogenic or noncardiogenic including ARDS)    Correlation with the patient's symptoms and laboratory values recommended    Small amount of ascites and intraperitoneal free air, probably related to recent intervention  6/23 PCXR: Extensive bilateral consolidation and groundglass opacity due to atelectasis and edema versus pneumonia on CT   Small right effusion  6/24 PCXR: Diffuse bilateral infiltrates  Minimal interval improvement allowing for improved inspiratory result      6/25 PCXR: Persistent moderate groundglass opacity    Ovoid opacity over the right base due to fluid in the fissure    Reexpansion of the right lower lobe      Results from last 7 days   Lab Units 06/24/21  1434   SARS-COV-2  Negative     Results from last 7 days   Lab Units 06/26/21  0330 06/25/21  0459 06/24/21  0532 06/23/21  0454 06/22/21  1602 06/22/21  0512   WBC Thousand/uL 6 70 5 51 8 20 8 80 8 20 4 40*   HEMOGLOBIN g/dL 11 9 12 4 12 0 12 4 10 2* 5 6*   HEMATOCRIT % 35 0 36 3 34 3* 36 3 28 8* 15 9*   PLATELETS Thousands/uL 215 215 208 182 176 142*   NEUTROS ABS Thousands/µL  --   --   --   --   --  3 80   TOTAL NEUT ABS Thousand/uL  --   --  7 79  --  7 87*  --    BANDS PCT %  --  3 3  --  7  --          Results from last 7 days   Lab Units 06/26/21  0330 06/25/21  0459 06/24/21  0532 06/23/21  0454 06/22/21  1602   SODIUM mmol/L 138 139 137 131* 134*   POTASSIUM mmol/L 4 0 4 1 3 2* 3 6 3 5*   CHLORIDE mmol/L 105 106 105 104 106   CO2 mmol/L 22 22 24 20* 24   ANION GAP mmol/L 11 11 8 7 4*   BUN mg/dL 28* 21 21 15 15   CREATININE mg/dL 1 06 0 98 0 78 0 74 0 73   EGFR ml/min/1 73sq m 57 63 83 88 90   CALCIUM mg/dL 8 1* 8 2* 8 4 8 3* 8 5   MAGNESIUM mg/dL 1 9 2 2  --  1 4*  --    PHOSPHORUS mg/dL 5 1*  --   --  3 2  --      Results from last 7 days   Lab Units 06/26/21  0330 06/25/21  0459 06/23/21  0454 06/21/21  1640   AST U/L 32 57* 73* 31   ALT U/L 20 24 13 15   ALK PHOS U/L 49 54 44 42*   TOTAL PROTEIN g/dL 5 5* 6 0* 5 1* 4 2*   ALBUMIN g/dL 1 6* 1 9* 2 4* 2 0*   TOTAL BILIRUBIN mg/dL 0 55 0 62 0 48 0 26     Results from last 7 days   Lab Units 06/26/21  0805 06/25/21  2122 06/25/21  1550   POC GLUCOSE mg/dl 97 114 160* Results from last 7 days   Lab Units 06/26/21  0330 06/25/21  0459 06/24/21  0532 06/23/21  0454 06/22/21  1602 06/22/21  0509 06/21/21  1640   GLUCOSE RANDOM mg/dL 112 91 103* 116* 93 64* 120*           Results from last 7 days   Lab Units 06/23/21  1140 06/23/21  0833 06/23/21  0529   TROPONIN I ng/mL 2 41* 2 88* 2 97*         Results from last 7 days   Lab Units 06/22/21  1602   PROTIME seconds 19 7*  20 5*   INR  1 75*   PTT seconds 54*  64*         Results from last 7 days   Lab Units 06/26/21  0806 06/25/21  0500   PROCALCITONIN ng/ml 2 06* 2 47*     Results from last 7 days   Lab Units 06/23/21  1140   LACTIC ACID mmol/L 1 3     Results from last 7 days   Lab Units 06/25/21  1210   INFLUENZA A PCR  None Detected   INFLUENZA B PCR  None Detected   RSV PCR  None Detected     Results from last 7 days   Lab Units 06/23/21  1609 06/23/21  0832 06/23/21  0831   BLOOD CULTURE   --  No Growth at 48 hrs  No Growth at 48 hrs     SPUTUM CULTURE  3+ Growth of Candida albicans*  3+ Growth of   --   --    GRAM STAIN RESULT  1+ Epithelial cells per low power field*  No polys seen*  Rare Gram negative rods*  Rare Gram positive rods*  --   --      Results from last 7 days   Lab Units 06/26/21  0330 06/25/21  0459 06/24/21  0532 06/22/21  1602   TOTAL COUNTED  100 100 100 100           Past Medical History:   Diagnosis Date    Anxiety     Hyperlipidemia      Present on Admission:   Acute respiratory failure with hypoxia ([address] )   complicated diverticulitis, colocutaneous fistula, s/p sigmoid colon resection   Nicotine dependence   (Resolved) Hyperlipidemia   Depression   Mild protein-calorie malnutrition (HCC)      Admitting Diagnosis: Respiratory distress [R06 03]  Age/Sex: 62 y o  female  Admission Orders:  Scheduled Medications:  cefepime, 2,000 mg, Intravenous, Q12H  heparin (porcine), 5,000 Units, Subcutaneous, Q8H [facility]  ipratropium-albuterol, 3 mL, Nebulization, Q6H  nicotine, 14 mg, Transdermal, Daily  oxyCODONE, 5 mg, Oral, Q6H ROLAND  pantoprazole, 40 mg, Oral, Early Morning  predniSONE, 40 mg, Oral, Daily  sertraline, 100 mg, Oral, Daily  sodium chloride, 4 mL, Nebulization, Q6H  vancomycin, 20 mg/kg, Intravenous, Q24H  Albuterol neb [email or web address]  IV [email or web address]  LR 250ml bolus @1420  IV flagyl [email or web address]    Continuous IV Infusions:none     PRN Meds:  ondansetron, 4 mg, Intravenous, Q8H PRN  oxyCODONE-acetaminophen, 1 tablet, Oral, Q4H PRN    bipap  SCD  Tele      IP CONSULT TO ACUTE CARE SURGERY    Network Utilization Review Department  ATTENTION: Please call with any questions or concerns to [phone] and carefully listen to the prompts so that you are directed to the right person  All voicemails are confidential   [facility] all requests for admission clinical reviews, approved or denied determinations and any other requests to dedicated fax number below belonging to the campus where the patient is receiving treatment   List of dedicated fax numbers for the Facilities:  [address] DENIALS (Administrative/Medical Necessity) [phone]   [address] (Maternity/NICU/Pediatrics) [phone]   [address] Kourtney Neal 6150 [id number] [place] [address] Radha Franco [address] [facility] [place] [address] [phone]

## 2021-06-26 NOTE — CONSULTS
Pt with suboptimal po intake, but is improving  Diet advanced to surgical soft at 08h15 on 06/26 and appears to be tolerating  No c/o ab pain, pt is passing flatus  Pt is amenable to Ensure BID to aid in meeting estimated needs and maintain wt  Will continue to follow for po tolerance  Please consult if alternate nutrition becomes warranted

## 2021-06-26 NOTE — MALNUTRITION/BMI
This medical record reflects one or more clinical indicators suggestive of malnutrition and/or morbid obesity  Malnutrition Findings:   Adult Malnutrition type: Chronic illness  Adult Degree of Malnutrition: Malnutrition of moderate degree  Malnutrition Characteristics: Fat loss, Muscle loss, Inadequate energy, Weight loss (PCM r/t complications of diverticulitis AEB Pt with s/s of malnutrition: loss of fat mass, orbitals dark/hollowing, loss in UE's  Loss of muscle mass: UE's, clavicle clearly visible  Stated unintend wt loss of 15% x 7 months  )Treating with supplements    BMI Findings: Body mass index is 21 44 kg/m²  See Nutrition note dated 06/26/2021 for additional details  Completed nutrition assessment is viewable in the nutrition documentation

## 2021-06-26 NOTE — PLAN OF CARE
Problem: MOBILITY - ADULT  Goal: Maintain or return to baseline ADL function  Description: INTERVENTIONS:  -  Assess patient's ability to carry out ADLs; assess patient's baseline for ADL function and identify physical deficits which impact ability to perform ADLs (bathing, care of mouth/teeth, toileting, grooming, dressing, etc )  - Assess/evaluate cause of self-care deficits   - Assess range of motion  - Assess patient's mobility; develop plan if impaired  - Assess patient's need for assistive devices and provide as appropriate  - Encourage maximum independence but intervene and supervise when necessary  - Involve family in performance of ADLs  - Assess for home care needs following discharge   - Consider OT consult to assist with ADL evaluation and planning for discharge  - Provide patient education as appropriate  Outcome: Progressing  Goal: Maintains/Returns to pre admission functional level  Description: INTERVENTIONS:  - Perform BMAT or MOVE assessment daily    - Set and communicate daily mobility goal to care team and patient/family/caregiver  - Collaborate with rehabilitation services on mobility goals if consulted  - Perform Range of Motion 4 times a day    - Dangle patient 3 times a day  - Stand patient 3 times a day  - Ambulate patient 3 times a day  - Out of bed to chair 3 times a day   - Out of bed for meals 3 times a day  - Out of bed for toileting  - Record patient progress and toleration of activity level   Outcome: Progressing     Problem: Potential for Falls  Goal: Patient will remain free of falls  Description: INTERVENTIONS:  - Educate patient/family on patient safety including physical limitations  - Instruct patient to call for assistance with activity   - Consult OT/PT to assist with strengthening/mobility   - Keep Call bell within reach  - Keep bed low and locked with side rails adjusted as appropriate  - Keep care items and personal belongings within reach  - Initiate and maintain comfort rounds  - Make Fall Risk Sign visible to staff  - Offer Toileting every hour, in advance of need  - Initiate/Maintain bed alarm  - Apply yellow socks and bracelet for high fall risk patients  - Consider moving patient to room near nurses station  Outcome: Progressing     Problem: Prexisting or High Potential for Compromised Skin Integrity  Goal: Skin integrity is maintained or improved  Description: INTERVENTIONS:  - Identify patients at risk for skin breakdown  - Assess and monitor skin integrity  - Assess and monitor nutrition and hydration status  - Monitor labs   - Assess for incontinence   - Turn and reposition patient  - Assist with mobility/ambulation  - Relieve pressure over bony prominences  - Avoid friction and shearing  - Provide appropriate hygiene as needed including keeping skin clean and dry  - Evaluate need for skin moisturizer/barrier cream  - Collaborate with interdisciplinary team   - Patient/family teaching  - Consider wound care consult   Outcome: Progressing     Problem: RESPIRATORY - ADULT  Goal: Achieves optimal ventilation and oxygenation  Description: INTERVENTIONS:  - Assess for changes in respiratory status  - Assess for changes in mentation and behavior  - Position to facilitate oxygenation and minimize respiratory effort  - Oxygen administered by appropriate delivery if ordered  - Initiate smoking cessation education as indicated  - Encourage broncho-pulmonary hygiene including cough, deep breathe, Incentive Spirometry  - Assess the need for suctioning and aspirate as needed  - Assess and instruct to report SOB or any respiratory difficulty  - Respiratory Therapy support as indicated  Outcome: Progressing     Problem: GASTROINTESTINAL - ADULT  Goal: Minimal or absence of nausea and/or vomiting  Description: INTERVENTIONS:  - Administer IV fluids if ordered to ensure adequate hydration  - Maintain NPO status until nausea and vomiting are resolved  - Administer ordered antiemetic medications as needed  - Provide nonpharmacologic comfort measures as appropriate  - Advance diet as tolerated, if ordered  - Consider nutrition services referral to assist patient with adequate nutrition and appropriate food choices  Outcome: Progressing  Goal: Maintains or returns to baseline bowel function  Description: INTERVENTIONS:  - Assess bowel function  - Encourage oral fluids to ensure adequate hydration  - Administer IV fluids if ordered to ensure adequate hydration  - Administer ordered medications as needed  - Encourage mobilization and activity  - Consider nutritional services referral to assist patient with adequate nutrition and appropriate food choices  Outcome: Progressing  Goal: Maintains adequate nutritional intake  Description: INTERVENTIONS:  - Monitor percentage of each meal consumed  - Identify factors contributing to decreased intake, treat as appropriate  - Assist with meals as needed  - Monitor I&O, weight, and lab values if indicated  - Obtain nutrition services referral as needed  Outcome: Progressing     Problem: METABOLIC, FLUID AND ELECTROLYTES - ADULT  Goal: Fluid balance maintained  Description: INTERVENTIONS:  - Monitor labs   - Monitor I/O and WT  - Instruct patient on fluid and nutrition as appropriate  - Assess for signs & symptoms of volume excess or deficit  Outcome: Progressing     Problem: SKIN/TISSUE INTEGRITY - ADULT  Goal: Incision(s), wounds(s) or drain site(s) healing without S/S of infection  Description: INTERVENTIONS  - Assess and document dressing, incision, wound bed, drain sites and surrounding tissue  - Provide patient and family education  - Perform skin care/dressing changes   Outcome: Progressing     Problem: HEMATOLOGIC - ADULT  Goal: Maintains hematologic stability  Description: INTERVENTIONS  - Assess for signs and symptoms of bleeding or hemorrhage  - Monitor labs  - Administer supportive blood products/factors as ordered and appropriate  Outcome: Progressing

## 2021-06-26 NOTE — CASE MANAGEMENT
LOS 2 Days  Pt is not a bundle  Pt is not a 30 day readmission  Unplanned readmission score is 9 (Green, low risk)  Acute 8LO2  Transfer from [facility]  POD 5 Sigmoidectomy and fistula takedown       CM met with pt at bedside  CM name and role reviewed  Pt reported that she lives in a 3 story home with her , oldest daughter and 4 grandchildren  There are 4 steps to enter  Pt reported that she is ADL independent  No dme needs  She ambulates on her own without assistance  She reported that she is employed at an [employer]  She works Wed-Sun from [employer]  She said that they have been working with her and cutting back hour  She said that her  works full time  Pt denied drug use or hx of it  She admitted to drinking a few beers daily  She declined any outpatient resources for alcohol use  She said that it does not affect her daily functioning  She reported that she is on zoloft for depression and anxiety  She reported that her PCP prescribes the zoloft  Her PCP is [facility]  Pt reported that she does not have any hx of IP psych tx  She reported no psychiatrist in the community  She reported no hx of VNA or SNF  STR rec  Pt is declining STR at this time  If pt does not go back to work for a while, she is open to VNA  She uses [facility] in [place]  Pt said that she has prescription coverage through [facility]  Pt said that her , Sravanthi Dowling is her POA  Pt drives  She said that her  will transport her home at discharge       CM reviewed discharge planning process including the following: identifying help at home, patient preference for discharge planning needs, pharmacy preference, and availability of treatment team to discuss questions or concerns patient and/or family may have regarding understanding medications and recognizing signs and symptoms once discharged  CM also encouraged patient to follow up with all recommended appointments after discharge   Patient advised of importance for patient and family to participate in managing patients medical well being

## 2021-06-26 NOTE — PROGRESS NOTES
Progress Note - Infectious Disease   Marlene Salazar 62 y o  female MRN: [id number]  Unit/Bed#: ICU 6 Encounter: [id number]      Impression/Plan:     73-year-old female patient with acute hypoxic respiratory failure noted on postop day 2 for  open resection of sigmoid colon, left salpingectomy         1- Acute hypoxic respiratory failure:  Developed on postop day 2 after open sigmoid colon resection and left salpingectomy;   Postop day 4 today, now on mid flow nasal cannula 15 liters/minute  Patient is chronic smoker, now with elevated troponin  CT chest x-ray suggesting mucus plugging with possible atelectasis or pneumonia  Patient is clinically improved, with less dyspnea and decreased O2 support  Sputum culture with mixed respiratory penelope; Flu, RSV, COVID-19 PCR negative  - suspect respiratory failure multifactorial   Concern for pulmonary edema in setting of elevated troponin   Concern for mucous plugging with subsequent atelectasis   Concern for pneumonia  -  sputum culture with mixed respiratory penelope  No MRSA   -  stop vancomycin, continue cefepime  - repeat CBC with differential, CMP, procalcitonin in a [facility] pulmonary follow-up   Encouraged patient to continue using spirometer and flutter valve  If no improvement, consider bronchoscopy to remove mucus plugs       2- Suspected pneumonia:  Probable perioperative/postoperative aspirate  Patient is clinically much improved  Respiratory culture with mixed penelope, not helpful   - antibiotics as above  - monitor respiratory symptoms     3- complicated diverticulitis:  With recurrent abscess and colocutaneous fistula  Now postop day 4 for open resection of sigmoid colon, left salpingectomy  Operative report reviewed, extensive phlegmonous changes and adhesions noted  Surgical wound with no signs of infection at this time    - frequent abdominal exams  - pain control as per primary service team and General surgery  - close general surgery follow-up     4- suspected COPD:  Patient with chronic smoking  Pulmonary evaluation reviewed, currently off steroids  - consider avoiding systemic corticosteroid treatment in setting of recent extensive surgical procedure as mentioned above  [facility] pulmonary team follow-up     5- nicotine dependence:  Stressed need for complete smoking cessation after hospital discharge      6- troponin elevation:  Possible non MI troponin elevation setting of acute hypoxic respiratory failure  - continue close monitoring of hemodynamics status  - cardiology follow-up     Discussed with patient in detail regarding the above plan  Discussed with Dr Bola Rebollar from [email or web address]]      Antibiotics:  Vancomycin day 4  Cefepime day 4     Subjective:  Patient remains in ICU  She is feeling much better  Dyspnea is improved  Cough is improved, mostly weak nonproductive  Abdominal pain mild and controlled  Temperature is down  No chills  She is tolerating antibiotics well  No nausea, vomiting or diarrhea      Objective:  Vitals:  Temp:  [97 7 °F (36 5 °C)-98 7 °F (37 1 °C)] 97 7 °F (36 5 °C)  HR:  [74-88] 82  Resp:  [17-34] 20  BP: ()/(57-79) 95/62  SpO2:  [85 %-99 %] 92 %  Temp (24hrs), Av 2 °F (36 8 °C), Min:97 7 °F (36 5 °C), Max:98 7 °F (37 1 °C)  Current: Temperature: 97 7 °F (36 5 °C)    Physical Exam:     General: Awake, alert, cooperative, no distress  Neck:  Supple  No mass  No lymphadenopathy  Lungs: Decreased breath sounds, sparse basilar rales, no wheezing, respirations unlabored  Heart:  Regular rate and rhythm, S1 and S2 normal, no murmur  Abdomen: Soft, nondistended, mild incisional tenderness, bowel sounds active all four quadrants, no masses, no organomegaly  Extremities: No edema  No erythema/warmth  No ulcer  Nontender to palpation  Skin:  No rash  Neuro: Moves all extremities       Invasive Devices     Peripheral Intravenous Line            Peripheral IV 21 Left;Upper Arm 1 day Peripheral IV 06/25/21 Left; Lower Arm 1 day                Labs studies:   I have personally reviewed pertinent labs  Results from last 7 days   Lab Units 06/26/21  0330 06/25/21  0459 06/24/21  0532 06/23/21  0454   POTASSIUM mmol/L 4 0 4 1 3 2* 3 6   CHLORIDE mmol/L 105 106 105 104   CO2 mmol/L 22 22 24 20*   BUN mg/dL 28* 21 21 15   CREATININE mg/dL 1 06 0 98 0 78 0 74   EGFR ml/min/1 73sq m 57 63 83 88   CALCIUM mg/dL 8 1* 8 2* 8 4 8 3*   AST U/L 32 57*  --  73*   ALT U/L 20 24  --  13   ALK PHOS U/L 49 54  --  44     Results from last 7 days   Lab Units 06/26/21  0330 06/25/21  0459 06/24/21  0532   WBC Thousand/uL 6 70 5 51 8 20   HEMOGLOBIN g/dL 11 9 12 4 12 0   PLATELETS Thousands/uL 215 215 208     Results from last 7 days   Lab Units 06/24/21  1304 06/23/21  1609 06/23/21  0832 06/23/21  0831   BLOOD CULTURE   --   --  No Growth at 48 hrs  No Growth at 48 hrs  SPUTUM CULTURE   --  3+ Growth of Candida albicans*  3+ Growth of   --   --    GRAM STAIN RESULT   --  1+ Epithelial cells per low power field*  No polys seen*  Rare Gram negative rods*  Rare Gram positive rods*  --   --    MRSA CULTURE ONLY  No Methicillin Resistant Staphlyococcus aureus (MRSA) isolated  --   --   --        Imaging Studies:   I have personally reviewed pertinent imaging study reports and images in PACS  EKG, Pathology, and Other Studies:   I have personally reviewed pertinent reports

## 2021-06-26 NOTE — PROGRESS NOTES
[facility]  Progress Note - Eladio Foster 1960, 62 y o  female MRN: [id number]  Unit/Bed#: ICU 11 Encounter: [id number]  Primary Care Provider: Kaya De Dios DO   Date and time admitted to hospital: 6/25/2021  4:38 AM    * Acute respiratory failure with hypoxia [facility])  Assessment & Plan  · S/p sigmoid resection colon resection  Was admitted to [place] where she was noted to have fatigue from work of breathing and placed on BiPAP  S/p solumedrol, lasix , and stopped IVF  · Transferred to [address] [place] [address] 6/25 for acute resp failure, initially on BiPap then tolerated mid-flow  HS bipap 14/6 70% overnight  · Likely mucus plugging vs pleural effusion vs pneumonia likely HCAP   · On exam no wheezing or overload noted so only placed on albuterol, no steroids or lasix given  · Imaging shows atelectasis, pleural effusion, emphysematous changes   · CTA PE negative  · Placed on cefepime, flagl, vanc  Consider d/c Vanco once MRSA back as sputum culture does not appear to be growing MRSA  Final results pending  · Continue albuterol   · Patient unable to cough due to abdominal pain, vest therapy?    complicated diverticulitis, colocutaneous fistula, s/p sigmoid colon resection  Assessment & Plan  · Diverticulitis complicated by abscess, nonhealing myocutaneous fistula despite medication intervention and drain  · On 6/21 came for sigmoid colon resection, left salpingectomy with splenic flexure mobilization  · Intact and clean midline incision, left wound packed with minimal serous drainage on dressing  Minimal expected tenderness     · Post op imaging showed some free air expected post Sx and small ascites  · Percocet and zofran available prn but has not required doses since arrival to ICU    Depression  Assessment & Plan  · Resumed sertraline 6/25    Nicotine dependence  Assessment & Plan  · Smokes 1 ppd  · Continue nicotine patch 24 hr qd    Mild protein-calorie malnutrition ([address] )  Assessment & Plan  Malnutrition Findings:           BMI Findings: Body mass index is 21 78 kg/m²  · Unclear degree of malnutrition, now with GI surgery and clear liquid intake only  · Nutrition on consult, appreciate recs          ----------------------------------------------------------------------------------------  HPI/24hr events: Transferred to [facility] ICU yesterday for BiPap, acute respiratory failure  Chest PT, antibiotics initiated with improvement and tolerating midflow last evening  Stable on HS BiPap overnight  Reports no change in symptoms since yesterday  Abdomen with some mild pain with movement/sitting up  Has not required narcotics for pain management  No other acute changes or problems overnight  Disposition: Continue Stepdown Level 1 level of care   Code Status: Level 1 - Full Code  ---------------------------------------------------------------------------------------  SUBJECTIVE  "I feel about the same, at least I'm not worse  My stomach hurts when I move around or sit up " Offers no other complaints  Review of Systems   Constitutional: Positive for appetite change (poor appetite)  Negative for chills, fatigue and fever  HENT: Negative  Negative for congestion  Respiratory: Positive for cough (productive for yellow sputum)  Cardiovascular: Negative for chest pain, palpitations and leg swelling  Gastrointestinal: Positive for abdominal pain (at site of midline incision and prior fistula site on LLQ with activity)  Negative for diarrhea, nausea and vomiting  Endocrine: Negative  Genitourinary: Negative  Musculoskeletal: Negative  Skin: Positive for wound (s/p abdominal surgery)  Neurological: Negative  Psychiatric/Behavioral: Negative        Review of systems was reviewed and negative unless stated above in HPI/24-hour events   ---------------------------------------------------------------------------------------  OBJECTIVE    Vitals   Vitals:    06/26/21 0120 06/26/21 0132 21 0220 21 0322   BP: (!) 89/64  106/73 119/70   Pulse: 74  84 88   Resp: (!) 27  18 (!) 26   Temp:    98 °F (36 7 °C)   TempSrc:    Temporal   SpO2: 98% 98% 98% (!) 85%   Weight:       Height:         Temp (24hrs), Av °F (36 7 °C), Min:97 6 °F (36 4 °C), Max:98 5 °F (36 9 °C)  Current: Temperature: 98 °F (36 7 °C)          Respiratory:  SpO2: SpO2: (!) 85 %  , SpO2 Activity: SpO2 Activity: At Rest, SpO2 Device: O2 Device: Mid flow nasal cannula  Nasal Cannula O2 Flow Rate (L/min): 15 L/min    Invasive/non-invasive ventilation settings   BiPap 14/6 and 70% FiO2  Respiratory    Lab Data (Last 4 hours)    None         O2/Vent Data (Last 4 hours)    None                Physical Exam  Vitals and nursing note reviewed  Constitutional:       General: She is not in acute distress  Appearance: She is not toxic-appearing or diaphoretic  HENT:      Head: Normocephalic and atraumatic  Nose: Nose normal       Mouth/Throat:      Mouth: Mucous membranes are moist    Eyes:      General: Lids are normal       Pupils: Pupils are equal, round, and reactive to light  Cardiovascular:      Rate and Rhythm: Normal rate and regular rhythm  Pulses: Normal pulses  Radial pulses are 2+ on the right side and 2+ on the left side  Dorsalis pedis pulses are 2+ on the right side and 2+ on the left side  Heart sounds: Normal heart sounds  No murmur heard  Pulmonary:      Effort: Pulmonary effort is normal       Breath sounds: Examination of the right-middle field reveals rales  Examination of the left-middle field reveals rales  Examination of the right-lower field reveals decreased breath sounds  Examination of the left-lower field reveals rales  Decreased breath sounds and rales present  Abdominal:      General: There is no distension  Palpations: Abdomen is soft  Tenderness: There is abdominal tenderness (at HUDSON/LLQ wound sites)  There is no guarding  Musculoskeletal:         General: No swelling  Cervical back: Neck supple  Right lower leg: No edema  Left lower leg: No edema  Skin:     General: Skin is warm and dry  Capillary Refill: Capillary refill takes less than 2 seconds  Neurological:      General: No focal deficit present  Mental Status: She is alert and oriented to person, place, and time  GCS: GCS eye subscore is 4  GCS verbal subscore is 5  GCS motor subscore is 6  Psychiatric:         Attention and Perception: Attention normal          Mood and Affect: Affect is blunt  Behavior: Behavior normal  Behavior is cooperative           Cognition and Memory: Cognition normal          Judgment: Judgment normal          Laboratory and Diagnostics:  Results from last 7 days   Lab Units 06/26/21  0330 06/25/21  0459 06/24/21  0532 06/23/21  0454 06/22/21  1602 06/22/21  0512 06/21/21  1640   WBC Thousand/uL 6 70 5 51 8 20 8 80 8 20 4 40* 5 70   HEMOGLOBIN g/dL 11 9 12 4 12 0 12 4 10 2* 5 6* 8 1*   HEMATOCRIT % 35 0 36 3 34 3* 36 3 28 8* 15 9* 23 8*   PLATELETS Thousands/uL 215 215 208 182 176 142* 236   NEUTROS PCT %  --   --   --   --   --  87* 85*   BANDS PCT %  --  3 3  --  7  --   --    MONOS PCT %  --   --   --   --   --  4 7   MONO PCT % 1* 2* 1  --  1  --   --      Results from last 7 days   Lab Units 06/26/21  0330 06/25/21  0459 06/24/21  0532 06/23/21  0454 06/22/21  1602 06/22/21  0509 06/21/21  1640   SODIUM mmol/L 138 139 137 131* 134* 131* 135*   POTASSIUM mmol/L 4 0 4 1 3 2* 3 6 3 5* 3 8 2 9*   CHLORIDE mmol/L 105 106 105 104 106 108 105   CO2 mmol/L 22 22 24 20* 24 18* 24   ANION GAP mmol/L 11 11 8 7 4* 5 6   BUN mg/dL 28* 21 21 15 15 9 11   CREATININE mg/dL 1 06 0 98 0 78 0 74 0 73 0 45* 0 59*   CALCIUM mg/dL 8 1* 8 2* 8 4 8 3* 8 5 7 0* 7 8*   GLUCOSE RANDOM mg/dL 112 91 103* 116* 93 64* 120*   ALT U/L 20 24  --  13  --   --  15   AST U/L 32 57*  --  73*  --   --  31   ALK PHOS U/L 49 54  --  44 --   --  42*   ALBUMIN g/dL 1 6* 1 9*  --  2 4*  --   --  2 0*   TOTAL BILIRUBIN mg/dL 0 55 0 62  --  0 48  --   --  0 26     Results from last 7 days   Lab Units 06/26/21  0330 06/25/21  0459 06/23/21  0454   MAGNESIUM mg/dL 1 9 2 2 1 4*   PHOSPHORUS mg/dL 5 1*  --  3 2      Results from last 7 days   Lab Units 06/22/21  1602   INR  1 75*   PTT seconds 54*  64*      Results from last 7 days   Lab Units 06/23/21  1140 06/23/21  0833 06/23/21  0529   TROPONIN I ng/mL 2 41* 2 88* 2 97*     Results from last 7 days   Lab Units 06/23/21  1140   LACTIC ACID mmol/L 1 3     ABG:    VBG:    Results from last 7 days   Lab Units 06/25/21  0500   PROCALCITONIN ng/ml 2 47*       Micro  Results from last 7 days   Lab Units 06/23/21  1609 06/23/21  0832 06/23/21  0831   BLOOD CULTURE   --  No Growth at 48 hrs  No Growth at 48 hrs  SPUTUM CULTURE  3+ Growth of Candida albicans*  3+ Growth of   --   --    GRAM STAIN RESULT  1+ Epithelial cells per low power field*  No polys seen*  Rare Gram negative rods*  Rare Gram positive rods*  --   --        EKG: NSR on monitor  Imaging: I have personally reviewed pertinent reports  No new imaging  Intake and Output  I/O       06/24 0701 - 06/25 0700 06/25 0701 - 06/26 0700    IV Piggyback  550    Total Intake(mL/kg)  550 (10)    Urine (mL/kg/hr) 600 971 (0 7)    Total Output 600 971    Net -600 -421                Height and Weights   Height: 5' 2 5" (158 8 cm)  IBW (Ideal Body Weight): 51 25 kg  Body mass index is 21 78 kg/m²  Weight (last 2 days)     Date/Time   Weight    06/25/21 0450   54 9 (121 03)                Nutrition       Diet Orders   (From admission, onward)             Start     Ordered    06/25/21 0449  Diet Clear Liquid  Effective tomorrow     Question Answer Comment   Diet Type Clear Liquid    RD to adjust diet per protocol?  No        06/25/21 0448                  Active Medications  Scheduled Meds:  Current Facility-Administered Medications   Medication Dose Route Frequency Provider Last Rate    cefepime  2,000 mg Intravenous Q12H LAMONTE Amezcua Stopped (06/25/21 2153)    heparin (porcine)  5,000 Units Subcutaneous Q8H [address] LAMONTE Amezcua      ipratropium-albuterol  3 mL Nebulization Q6H Dez Bryant MD      nicotine  14 mg Transdermal Daily LAMONTE Amezcua      ondansetron  4 mg Intravenous Q8H PRN LAMONTE Amezcua      oxyCODONE  5 mg Oral Q6H [address] Jane Forrester Ala, MD      oxyCODONE-acetaminophen  1 tablet Oral Q4H PRN LAMONTE Amezcua      pantoprazole  40 mg Oral Early Morning Maverick Villar MD      predniSONE  40 mg Oral Daily Dez Bryant MD      sertraline  100 mg Oral Daily Dez Bryant MD      sodium chloride  4 mL Nebulization Q6H LAMONTE Amezcua      vancomycin  20 mg/kg Intravenous Q24H Maverick Villar MD       Continuous Infusions:     PRN Meds:   ondansetron, 4 mg, Q8H PRN  oxyCODONE-acetaminophen, 1 tablet, Q4H PRN        Invasive Devices Review  Invasive Devices     Peripheral Intravenous Line            Peripheral IV 06/24/21 Left;Upper Arm 1 day    Peripheral IV 06/25/21 Left; Lower Arm <1 day                Rationale for remaining devices: PIV's for med infusions    ---------------------------------------------------------------------------------------  Advance Directive and Living Will:      Power of :    POLST:    ---------------------------------------------------------------------------------------  Care Time Delivered:   No Critical Care time spent       [facility], CRNP      Portions of the record may have been created with voice recognition software  Occasional wrong word or "sound a like" substitutions may have occurred due to the inherent limitations of voice recognition software    Read the chart carefully and recognize, using context, where substitutions have occurred

## 2021-06-26 NOTE — ASSESSMENT & PLAN NOTE
Malnutrition Findings:           BMI Findings: Body mass index is 21 78 kg/m²       · Unclear degree of malnutrition, now with GI surgery and clear liquid intake only  · Nutrition on consult, appreciate recs

## 2021-06-26 NOTE — PLAN OF CARE
Problem: MOBILITY - ADULT  Goal: Maintain or return to baseline ADL function  Description: INTERVENTIONS:  -  Assess patient's ability to carry out ADLs; assess patient's baseline for ADL function and identify physical deficits which impact ability to perform ADLs (bathing, care of mouth/teeth, toileting, grooming, dressing, etc )  - Assess/evaluate cause of self-care deficits   - Assess range of motion  - Assess patient's mobility; develop plan if impaired  - Assess patient's need for assistive devices and provide as appropriate  - Encourage maximum independence but intervene and supervise when necessary  - Involve family in performance of ADLs  - Assess for home care needs following discharge   - Consider OT consult to assist with ADL evaluation and planning for discharge  - Provide patient education as appropriate  Outcome: Progressing  Goal: Maintains/Returns to pre admission functional level  Description: INTERVENTIONS:  - Perform BMAT or MOVE assessment daily    - Set and communicate daily mobility goal to care team and patient/family/caregiver     - Collaborate with rehabilitation services on mobility goals if consulted  - Out of bed for toileting  - Record patient progress and toleration of activity level   Outcome: Progressing     Problem: Potential for Falls  Goal: Patient will remain free of falls  Description: INTERVENTIONS:  - Educate patient/family on patient safety including physical limitations  - Instruct patient to call for assistance with activity   - Consult OT/PT to assist with strengthening/mobility   - Keep Call bell within reach  - Keep bed low and locked with side rails adjusted as appropriate  - Keep care items and personal belongings within reach  - Initiate and maintain comfort rounds  - Make Fall Risk Sign visible to staff  - Apply yellow socks and bracelet for high fall risk patients  - Consider moving patient to room near nurses station  Outcome: Progressing     Problem: Prexisting or High Potential for Compromised Skin Integrity  Goal: Skin integrity is maintained or improved  Description: INTERVENTIONS:  - Identify patients at risk for skin breakdown  - Assess and monitor skin integrity  - Assess and monitor nutrition and hydration status  - Monitor labs   - Assess for incontinence   - Turn and reposition patient  - Assist with mobility/ambulation  - Relieve pressure over bony prominences  - Avoid friction and shearing  - Provide appropriate hygiene as needed including keeping skin clean and dry  - Evaluate need for skin moisturizer/barrier cream  - Collaborate with interdisciplinary team   - Patient/family teaching  - Consider wound care consult   Outcome: Progressing     Problem: RESPIRATORY - ADULT  Goal: Achieves optimal ventilation and oxygenation  Description: INTERVENTIONS:  - Assess for changes in respiratory status  - Assess for changes in mentation and behavior  - Position to facilitate oxygenation and minimize respiratory effort  - Oxygen administered by appropriate delivery if ordered  - Initiate smoking cessation education as indicated  - Encourage broncho-pulmonary hygiene including cough, deep breathe, Incentive Spirometry  - Assess the need for suctioning and aspirate as needed  - Assess and instruct to report SOB or any respiratory difficulty  - Respiratory Therapy support as indicated  Outcome: Progressing     Problem: GASTROINTESTINAL - ADULT  Goal: Minimal or absence of nausea and/or vomiting  Description: INTERVENTIONS:  - Administer IV fluids if ordered to ensure adequate hydration  - Maintain NPO status until nausea and vomiting are resolved  - Administer ordered antiemetic medications as needed  - Provide nonpharmacologic comfort measures as appropriate  - Advance diet as tolerated, if ordered  - Consider nutrition services referral to assist patient with adequate nutrition and appropriate food choices  Outcome: Progressing  Goal: Maintains or returns to baseline bowel function  Description: INTERVENTIONS:  - Assess bowel function  - Encourage oral fluids to ensure adequate hydration  - Administer IV fluids if ordered to ensure adequate hydration  - Administer ordered medications as needed  - Encourage mobilization and activity  - Consider nutritional services referral to assist patient with adequate nutrition and appropriate food choices  Outcome: Progressing  Goal: Maintains adequate nutritional intake  Description: INTERVENTIONS:  - Monitor percentage of each meal consumed  - Identify factors contributing to decreased intake, treat as appropriate  - Assist with meals as needed  - Monitor I&O, weight, and lab values if indicated  - Obtain nutrition services referral as needed  Outcome: Progressing     Problem: METABOLIC, FLUID AND ELECTROLYTES - ADULT  Goal: Fluid balance maintained  Description: INTERVENTIONS:  - Monitor labs   - Monitor I/O and WT  - Instruct patient on fluid and nutrition as appropriate  - Assess for signs & symptoms of volume excess or deficit  Outcome: Progressing     Problem: SKIN/TISSUE INTEGRITY - ADULT  Goal: Incision(s), wounds(s) or drain site(s) healing without S/S of infection  Description: INTERVENTIONS  - Assess and document dressing, incision, wound bed, drain sites and surrounding tissue  - Provide patient and family education  - Perform skin care/dressing changes   Outcome: Progressing     Problem: HEMATOLOGIC - ADULT  Goal: Maintains hematologic stability  Description: INTERVENTIONS  - Assess for signs and symptoms of bleeding or hemorrhage  - Monitor labs  - Administer supportive blood products/factors as ordered and appropriate  Outcome: Progressing

## 2021-06-27 PROBLEM — E87.6 HYPOKALEMIA: Status: ACTIVE | Noted: 2021-06-27

## 2021-06-27 LAB
ALBUMIN SERPL BCP-MCNC: 1.6 G/DL (ref 3.5–5)
ALP SERPL-CCNC: 48 U/L (ref 46–116)
ALT SERPL W P-5'-P-CCNC: 20 U/L (ref 12–78)
ANION GAP SERPL CALCULATED.3IONS-SCNC: 13 MMOL/L (ref 4–13)
AST SERPL W P-5'-P-CCNC: 23 U/L (ref 5–45)
BILIRUB SERPL-MCNC: 0.36 MG/DL (ref 0.2–1)
BUN SERPL-MCNC: 32 MG/DL (ref 5–25)
CALCIUM ALBUM COR SERPL-MCNC: 10 MG/DL (ref 8.3–10.1)
CALCIUM SERPL-MCNC: 8.1 MG/DL (ref 8.3–10.1)
CHLORIDE SERPL-SCNC: 106 MMOL/L (ref 100–108)
CO2 SERPL-SCNC: 20 MMOL/L (ref 21–32)
CREAT SERPL-MCNC: 0.86 MG/DL (ref 0.6–1.3)
ERYTHROCYTE [DISTWIDTH] IN BLOOD BY AUTOMATED COUNT: 19 % (ref 11.6–15.1)
GFR SERPL CREATININE-BSD FRML MDRD: 74 ML/MIN/1.73SQ M
GLUCOSE SERPL-MCNC: 112 MG/DL (ref 65–140)
GLUCOSE SERPL-MCNC: 118 MG/DL (ref 65–140)
GLUCOSE SERPL-MCNC: 85 MG/DL (ref 65–140)
GLUCOSE SERPL-MCNC: 97 MG/DL (ref 65–140)
HCT VFR BLD AUTO: 30 % (ref 34.8–46.1)
HGB BLD-MCNC: 10 G/DL (ref 11.5–15.4)
MAGNESIUM SERPL-MCNC: 2.2 MG/DL (ref 1.6–2.6)
MCH RBC QN AUTO: 34 PG (ref 26.8–34.3)
MCHC RBC AUTO-ENTMCNC: 33.3 G/DL (ref 31.4–37.4)
MCV RBC AUTO: 102 FL (ref 82–98)
PHOSPHATE SERPL-MCNC: 3.8 MG/DL (ref 2.3–4.1)
PLATELET # BLD AUTO: 142 THOUSANDS/UL (ref 149–390)
PMV BLD AUTO: 8.7 FL (ref 8.9–12.7)
POTASSIUM SERPL-SCNC: 3.4 MMOL/L (ref 3.5–5.3)
PROT SERPL-MCNC: 5.2 G/DL (ref 6.4–8.2)
RBC # BLD AUTO: 2.94 MILLION/UL (ref 3.81–5.12)
SODIUM SERPL-SCNC: 139 MMOL/L (ref 136–145)
WBC # BLD AUTO: 3.89 THOUSAND/UL (ref 4.31–10.16)

## 2021-06-27 PROCEDURE — 99232 SBSQ HOSP IP/OBS MODERATE 35: CPT | Performed by: INTERNAL MEDICINE

## 2021-06-27 PROCEDURE — 99024 POSTOP FOLLOW-UP VISIT: CPT | Performed by: SURGERY

## 2021-06-27 PROCEDURE — 82948 REAGENT STRIP/BLOOD GLUCOSE: CPT

## 2021-06-27 PROCEDURE — 94760 N-INVAS EAR/PLS OXIMETRY 1: CPT

## 2021-06-27 PROCEDURE — 84100 ASSAY OF PHOSPHORUS: CPT | Performed by: NURSE PRACTITIONER

## 2021-06-27 PROCEDURE — 94640 AIRWAY INHALATION TREATMENT: CPT

## 2021-06-27 PROCEDURE — 80053 COMPREHEN METABOLIC PANEL: CPT | Performed by: NURSE PRACTITIONER

## 2021-06-27 PROCEDURE — 94660 CPAP INITIATION&MGMT: CPT

## 2021-06-27 PROCEDURE — 85027 COMPLETE CBC AUTOMATED: CPT | Performed by: NURSE PRACTITIONER

## 2021-06-27 PROCEDURE — 83735 ASSAY OF MAGNESIUM: CPT | Performed by: NURSE PRACTITIONER

## 2021-06-27 RX ORDER — POTASSIUM CHLORIDE 20 MEQ/1
40 TABLET, EXTENDED RELEASE ORAL ONCE
Status: COMPLETED | OUTPATIENT
Start: 2021-06-27 | End: 2021-06-27

## 2021-06-27 RX ORDER — FUROSEMIDE 10 MG/ML
20 INJECTION INTRAMUSCULAR; INTRAVENOUS ONCE
Status: COMPLETED | OUTPATIENT
Start: 2021-06-27 | End: 2021-06-27

## 2021-06-27 RX ADMIN — HEPARIN SODIUM 5000 UNITS: 5000 INJECTION INTRAVENOUS; SUBCUTANEOUS at 21:55

## 2021-06-27 RX ADMIN — BUDESONIDE 0.5 MG: 0.5 INHALANT ORAL at 20:05

## 2021-06-27 RX ADMIN — FUROSEMIDE 20 MG: 10 INJECTION, SOLUTION INTRAVENOUS at 10:43

## 2021-06-27 RX ADMIN — BUDESONIDE 0.5 MG: 0.5 INHALANT ORAL at 07:34

## 2021-06-27 RX ADMIN — IPRATROPIUM BROMIDE AND ALBUTEROL SULFATE 3 ML: 2.5; .5 SOLUTION RESPIRATORY (INHALATION) at 02:00

## 2021-06-27 RX ADMIN — IPRATROPIUM BROMIDE AND ALBUTEROL SULFATE 3 ML: 2.5; .5 SOLUTION RESPIRATORY (INHALATION) at 13:45

## 2021-06-27 RX ADMIN — CEFEPIME HYDROCHLORIDE 2000 MG: 2 INJECTION, POWDER, FOR SOLUTION INTRAVENOUS at 10:21

## 2021-06-27 RX ADMIN — POTASSIUM CHLORIDE 40 MEQ: 1500 TABLET, EXTENDED RELEASE ORAL at 08:51

## 2021-06-27 RX ADMIN — OXYCODONE HYDROCHLORIDE 5 MG: 5 TABLET ORAL at 23:52

## 2021-06-27 RX ADMIN — OXYCODONE HYDROCHLORIDE 5 MG: 5 TABLET ORAL at 18:05

## 2021-06-27 RX ADMIN — CEFEPIME HYDROCHLORIDE 2000 MG: 2 INJECTION, POWDER, FOR SOLUTION INTRAVENOUS at 21:55

## 2021-06-27 RX ADMIN — HEPARIN SODIUM 5000 UNITS: 5000 INJECTION INTRAVENOUS; SUBCUTANEOUS at 14:26

## 2021-06-27 RX ADMIN — HEPARIN SODIUM 5000 UNITS: 5000 INJECTION INTRAVENOUS; SUBCUTANEOUS at 06:29

## 2021-06-27 RX ADMIN — SERTRALINE HYDROCHLORIDE 100 MG: 100 TABLET ORAL at 09:12

## 2021-06-27 RX ADMIN — OXYCODONE HYDROCHLORIDE 5 MG: 5 TABLET ORAL at 12:30

## 2021-06-27 RX ADMIN — IPRATROPIUM BROMIDE AND ALBUTEROL SULFATE 3 ML: 2.5; .5 SOLUTION RESPIRATORY (INHALATION) at 07:34

## 2021-06-27 RX ADMIN — OXYCODONE HYDROCHLORIDE 5 MG: 5 TABLET ORAL at 06:28

## 2021-06-27 RX ADMIN — Medication 14 MG: at 08:52

## 2021-06-27 RX ADMIN — PANTOPRAZOLE SODIUM 40 MG: 40 TABLET, DELAYED RELEASE ORAL at 06:28

## 2021-06-27 RX ADMIN — IPRATROPIUM BROMIDE AND ALBUTEROL SULFATE 3 ML: 2.5; .5 SOLUTION RESPIRATORY (INHALATION) at 20:05

## 2021-06-27 NOTE — PROGRESS NOTES
Progress Note - Infectious Disease   Marlene Lindsay 62 y o  female MRN: [id number]  Unit/Bed#: E2 -01 Encounter: [id number]      Impression/Plan:     56-year-old female patient with acute hypoxic respiratory failure noted on postop day 2 for  open resection of sigmoid colon, left salpingectomy         1- Acute hypoxic respiratory failure:  Developed on postop day 2 after open sigmoid colon resection and left salpingectomy;   This may be postoperative/perioperative aspiration pneumonia  Patient is clinically much improved on antibiotic  O2 support much decreased  Patient is chronic smoker, now with elevated troponin  Sputum culture with mixed respiratory penelope; Flu, RSV, COVID-19 PCR negative  - suspect respiratory failure multifactorial   Concern for pulmonary edema in setting of elevated troponin   Concern for mucous plugging with subsequent atelectasis   Concern for pneumonia  -  sputum culture with mixed respiratory penelope   No MRSA   -  continue cefepime  - repeat CBC with differential, CMP, procalcitonin in a m   - close pulmonary follow-up       2- Suspected pneumonia:  Probable perioperative/postoperative aspirate  Patient is clinically much improved  Respiratory culture with mixed penelope, not helpful   - antibiotic as above  - monitor respiratory symptoms     3- complicated diverticulitis:  With recurrent abscess and colocutaneous fistula  Now postop day 4 for open resection of sigmoid colon, left salpingectomy  Operative report reviewed, extensive phlegmonous changes and adhesions noted  Surgical wound with no signs of infection at this time  - frequent abdominal exams  - pain control as per primary service team and General surgery  - close general surgery follow-up     4- suspected COPD:  Patient with chronic smoking  Pulmonary evaluation reviewed, currently off steroids     - consider avoiding systemic corticosteroid treatment in setting of recent extensive surgical procedure as mentioned above  Zelalem Velasquez pulmonary team follow-up     5- nicotine dependence:  Stressed need for complete smoking cessation after hospital discharge      6- troponin elevation:  Possible non MI troponin elevation setting of acute hypoxic respiratory failure  - continue close monitoring of hemodynamics status  - cardiology follow-up     Discussed with patient and family in detail regarding the above plan      Antibiotics:  Cefepime day 5     Subjective:  Patient remains in ICU  However, she is feeling better every day  She remains on O2 but dyspnea improving every day  Cough mild, nonproductive now  Abdominal pain mild and controlled  Temperature stays down  No chills  She is tolerating antibiotic well  No nausea, vomiting or diarrhea      Objective:  Vitals:  Temp:  [97 7 °F (36 5 °C)-98 8 °F (37 1 °C)] 97 9 °F (36 6 °C)  HR:  [80-96] 92  Resp:  [15-26] 19  BP: ()/(56-92) 104/69  SpO2:  [93 %-99 %] 96 %  Temp (24hrs), Av 3 °F (36 8 °C), Min:97 7 °F (36 5 °C), Max:98 8 °F (37 1 °C)  Current: Temperature: 97 9 °F (36 6 °C)    Physical Exam:     General: Awake, alert, cooperative, no distress  Neck:  Supple  No mass  No lymphadenopathy  Lungs: Expansion symmetric, sparse basilar rales, no wheezing, respirations unlabored  Heart:  Regular rate and rhythm, S1 and S2 normal, no murmur  Abdomen: Soft, mild distended, mild incisional tenderness, bowel sounds active all four quadrants, no masses, no organomegaly  Extremities: Trace edema  No erythema/warmth  No ulcer  Nontender to palpation  Skin:  No rash  Neuro: Moves all extremities  Invasive Devices     Peripheral Intravenous Line            Peripheral IV 21 Left; Lower Arm 2 days                Labs studies:   I have personally reviewed pertinent labs    Results from last 7 days   Lab Units 21  0459 21  0330 21  0459   POTASSIUM mmol/L 3 4* 4 0 4 1   CHLORIDE mmol/L 106 105 106   CO2 mmol/L 20* 22 22   BUN mg/dL 32* 28* 21   CREATININE mg/dL 0 86 1 06 0 98   EGFR ml/min/1 73sq m 74 57 63   CALCIUM mg/dL 8 1* 8 1* 8 2*   AST U/L 23 32 57*   ALT U/L 20 20 24   ALK PHOS U/L 48 49 54     Results from last 7 days   Lab Units 06/27/21  0459 06/26/21  0330 06/25/21  0459   WBC Thousand/uL 3 89* 6 70 5 51   HEMOGLOBIN g/dL 10 0* 11 9 12 4   PLATELETS Thousands/uL 142* 215 215     Results from last 7 days   Lab Units 06/24/21  1304 06/23/21  1609 06/23/21  0832 06/23/21  0831   BLOOD CULTURE   --   --  No Growth at 72 hrs  No Growth at 72 hrs  SPUTUM CULTURE   --  3+ Growth of Candida albicans*  3+ Growth of   --   --    GRAM STAIN RESULT   --  1+ Epithelial cells per low power field*  No polys seen*  Rare Gram negative rods*  Rare Gram positive rods*  --   --    MRSA CULTURE ONLY  No Methicillin Resistant Staphlyococcus aureus (MRSA) isolated  --   --   --        Imaging Studies:   I have personally reviewed pertinent imaging study reports and images in PACS  EKG, Pathology, and Other Studies:   I have personally reviewed pertinent reports

## 2021-06-27 NOTE — PROGRESS NOTES
Progress Note - General Surgery   Marlene Vences 62 y o  female MRN: [id number]  Unit/Bed#: ICU 6 Encounter: [id number]    Assessment:  62 y o  female now   s/p sigmoidectomy + primary anastomosis with colocutaneous fistula take down- POD 6  Admitted to [facility] for HF exacerbation and acute hypoxic respiratory failure 2/2 probable PNA  Stable VS in room air  Tolerating soft diet  Had a BM  Plan:  - Diet Surgical; Surgical Soft/Lite Meal  - Pain and Nausea control PRN  - Incentive spirometry  - OOB, ambulate  - continue IV antibiotics  - continue local wound care  - other care per ICU  Subjective/Objective     Subjective:   - no new complaints  Objective:   Vitals: Blood pressure 118/73, pulse 96, temperature 97 9 °F (36 6 °C), temperature source Temporal, resp  rate 22, height 5' 3" (1 6 m), weight 54 9 kg (121 lb 0 5 oz), SpO2 96 %  ,Body mass index is 21 44 kg/m²  I/O       06/25 0701 - 06/26 0700 06/26 0701 - 06/27 0700 06/27 0701 - 06/28 0700    P  O   180     IV Piggyback 550 100     Total Intake(mL/kg) 550 (10) 280 (5 1)     Urine (mL/kg/hr) 971 (0 7) 450 (0 3)     Total Output 971 450     Net -421 -170            Unmeasured Stool Occurrence   1 x          Physical Exam:  Gen: NAD, Aox3, Comfortable in bed  Chest: Normal work of breathing, no respiratory distress  Abd: Soft, ND, appro tender to palpation  Midline incision well apposed  Clean dressing over prior fistula site  Ext: No Edema  Skin: Warm, Dry, Intact      Lab, Imaging and other studies:   I have personally reviewed pertinent reports    , CBC with diff:   Lab Results   Component Value Date    WBC 3 89 (L) 06/27/2021    HGB 10 0 (L) 06/27/2021    HCT 30 0 (L) 06/27/2021     (H) 06/27/2021     (L) 06/27/2021    MCH 34 0 06/27/2021    MCHC 33 3 06/27/2021    RDW 19 0 (H) 06/27/2021    MPV 8 7 (L) 06/27/2021   , BMP/CMP:   Lab Results   Component Value Date    SODIUM 139 06/27/2021    K 3 4 (L) 06/27/2021     06/27/2021    CO2 20 (L) 06/27/2021    BUN 32 (H) 06/27/2021    CREATININE 0 86 06/27/2021    CALCIUM 8 1 (L) 06/27/2021    AST 23 06/27/2021    ALT 20 06/27/2021    ALKPHOS 48 06/27/2021    EGFR 74 06/27/2021     VTE Pharmacologic Prophylaxis: Heparin  VTE Mechanical Prophylaxis: sequential compression device

## 2021-06-27 NOTE — PROGRESS NOTES
Progress Note - General Surgery  : [facility] Surgery Resident on [facility] VINCE Vázquez 62 y o  female MRN: [id number]  Unit/Bed#: ICU 6 Encounter: [id number]    Assessment:  62 y o  female POD7 from sigmoidectomy with primary anastamosis, colocutaneous fistula takedown  Admitted to medical critical care and improving, being transferred out of the ICU to [facility] yesterday      Plan:  Surg soft diet  Daily mesalt packing changes to LLQ fistula takedown site by nursing team  Pna, volume status, respiratory management per primary team / ID  No plan for further surgical intervention    Subjective/Objective     Subjective: No complaints, endorses BM and good PO intake      Objective:     Physical Exam:  GEN: NAD  HEENT: MMM  CV: RRR  Lung: Normal effort, no shortness of breath with oxygen taken off by the patient  Ab: Soft, ND, mildly tender LLQ, CDI, appropriate serous discharge on LLQ mesalt packing / gauze dressing  Extrem: No CCE   Neuro: A+Ox3     Vitals: Temp:  [97 7 °F (36 5 °C)-98 8 °F (37 1 °C)] 97 9 °F (36 6 °C)  HR:  [80-96] 92  Resp:  [15-26] 19  BP: ()/(56-92) 104/69  Body mass index is 21 44 kg/m²  I/O       06/25 0701 - 06/26 0700 06/26 0701 - 06/27 0700 06/27 0701 - 06/28 0700    P  O   180 180    IV Piggyback 550 100 50    Total Intake(mL/kg) 550 (10) 280 (5 1) 230 (4 2)    Urine (mL/kg/hr) 971 (0 7) 450 (0 3)     Total Output 971 450     Net -421 -170 +230           Unmeasured Urine Occurrence   1 x    Unmeasured Stool Occurrence   2 x            Lab, Imaging and other studies: I have personally reviewed pertinent reports    , CBC with diff:   Lab Results   Component Value Date    WBC 3 89 (L) 06/27/2021    HGB 10 0 (L) 06/27/2021    HCT 30 0 (L) 06/27/2021     (H) 06/27/2021     (L) 06/27/2021    MCH 34 0 06/27/2021    MCHC 33 3 06/27/2021    RDW 19 0 (H) 06/27/2021    MPV 8 7 (L) 06/27/2021   , BMP/CMP:   Lab Results   Component Value Date    SODIUM 139 06/27/2021 K 3 4 (L) 06/27/2021     06/27/2021    CO2 20 (L) 06/27/2021    BUN 32 (H) 06/27/2021    CREATININE 0 86 06/27/2021    CALCIUM 8 1 (L) 06/27/2021    AST 23 06/27/2021    ALT 20 06/27/2021    ALKPHOS 48 06/27/2021    EGFR 74 06/27/2021     VTE Pharmacologic Prophylaxis: Heparin  VTE Mechanical Prophylaxis: sequential compression device      Lc Bob MD  6/27/2021 11:30 AM

## 2021-06-27 NOTE — ASSESSMENT & PLAN NOTE
Malnutrition Findings:   Adult Malnutrition type: Chronic illness  Adult Degree of Malnutrition: Malnutrition of moderate degree    BMI Findings: Body mass index is 21 44 kg/m²       · As per GI surgery, surgical lite diet, advance as GI suggests  · Nutrition on consult, appreciate recs

## 2021-06-27 NOTE — ASSESSMENT & PLAN NOTE
· S/p sigmoid resection colon resection  Was admitted to [place] where she was noted to have fatigue from work of breathing and placed on BiPAP  S/p solumedrol, lasix , and stopped IVF  · Transferred to [facility] 6/25 for acute resp failure, initially on BiPap then tolerated mid-flow  HS bipap 14/6 70% overnight  · Likely mucus plugging vs pleural effusion secondary to abdominal pathology vs pneumonia possible HCAP Aspiration    · On exam no wheezing or overload noted so only placed on albuterol, no steroids or lasix given  · Imaging shows atelectasis, pleural effusion, emphysematous changes   · CTA PE negative  · d/tiffanie Vanco and flagyl  · Continue cefepime D3, Total Day 7  Dc cefepime?   · Continue Duo neb  · Continue pulmicort  · Vest therapy  · Continue to wean 02 as needed, currently on midflow, will suggest transfer to floor with pulm following   · Lasix 20 x 1 6/27

## 2021-06-27 NOTE — PROGRESS NOTES
[facility]  Progress Note - Davon Orlando 1960, 62 y o  female MRN: [id number]  Unit/Bed#: ICU 11 Encounter: [id number]  Primary Care Provider: Danielle Dias DO   Date and time admitted to hospital: 6/25/2021  4:38 AM    * Acute respiratory failure with hypoxia [facility])  Assessment & Plan  · S/p sigmoid resection colon resection  Was admitted to [place] where she was noted to have fatigue from work of breathing and placed on BiPAP  S/p solumedrol, lasix , and stopped IVF  · Transferred to [address] [place] [address] 6/25 for acute resp failure, initially on BiPap then tolerated mid-flow  HS bipap 14/6 70% overnight  · Likely mucus plugging vs pleural effusion secondary to abdominal pathology vs pneumonia possible HCAP   · On exam no wheezing or overload noted so only placed on albuterol, no steroids or lasix given  · Imaging shows atelectasis, pleural effusion, emphysematous changes   · CTA PE negative  · d/tiffanie Vanco and flagyl  · Continue cefepime D3, Total Day 7  Dc cefepime? · Continue Duo neb  · Continue pulmocort  · Vest therapy  · Continue to wean 02 as needed, currently on midflow, will suggest transfer to floor with pulm following     Nicotine dependence  Assessment & Plan  · Smokes 1 ppd  · Continue nicotine patch 24 hr qd    Moderate protein-calorie malnutrition (HCC)  Assessment & Plan  Malnutrition Findings:   Adult Malnutrition type: Chronic illness  Adult Degree of Malnutrition: Malnutrition of moderate degree    BMI Findings: Body mass index is 21 44 kg/m²  · As per GI surgery, surgical lite diet, advance as GI suggests  · Nutrition on consult, appreciate recs      complicated diverticulitis, colocutaneous fistula, s/p sigmoid colon resection  Assessment & Plan  · Diverticulitis complicated by abscess, nonhealing myocutaneous fistula despite medication intervention and drain  · On 6/21 came for sigmoid colon resection, left salpingectomy with splenic flexure mobilization  · Intact and clean midline incision, left wound packed with minimal serous drainage on dressing  Minimal expected tenderness  · Post op imaging showed some free air expected post Sx and small ascites  · Percocet and zofran available prn but has not required doses since arrival to ICU    Depression  Assessment & Plan  · Resumed sertraline       ----------------------------------------------------------------------------------------  HPI/24hr events:   No overnight events  MRSA negative on , so vancomycin dced    Disposition: Transfer to Med-Surg   Code Status: Level 1 - Full Code  ---------------------------------------------------------------------------------------  SUBJECTIVE  No acute complaints  No abdomina; pain chest pain, SOB  Has LL swelling which is new  Review of Systems  Review of systems was reviewed and negative unless stated above in HPI/24-hour events   ---------------------------------------------------------------------------------------  OBJECTIVE    Vitals   Vitals:    21 0300 21 0400 21 0500 21 0600   BP: 96/65 113/71 119/75 136/85   Pulse: 88 86 88 92   Resp: 15 15 15 22   Temp:  97 7 °F (36 5 °C)     TempSrc:  Temporal     SpO2: 99% 99% 99% 99%   Weight:       Height:         Temp (24hrs), Av 3 °F (36 8 °C), Min:97 7 °F (36 5 °C), Max:98 8 °F (37 1 °C)  Current: Temperature: 97 7 °F (36 5 °C)          Respiratory:  SpO2: SpO2: 99 %, SpO2 Activity: SpO2 Activity: At Rest, SpO2 Device: O2 Device: BiPAP, Capnography:    Nasal Cannula O2 Flow Rate (L/min): 8 L/min    Invasive/non-invasive ventilation settings   Respiratory    Lab Data (Last 4 hours)    None         O2/Vent Data (Last 4 hours)    None                Physical Exam  Vitals and nursing note reviewed  Constitutional:       General: She is not in acute distress  Appearance: Normal appearance  She is well-developed  Comments: On 8 L 02   HENT:      Head: Normocephalic and atraumatic  Eyes:      Extraocular Movements: Extraocular movements intact  Conjunctiva/sclera: Conjunctivae normal    Cardiovascular:      Rate and Rhythm: Normal rate and regular rhythm  Heart sounds: Murmur heard  Pulmonary:      Effort: Pulmonary effort is normal  No respiratory distress  Comments: Crackle in Left lung, breath sounds throughtout Left and right  Abdominal:      Palpations: Abdomen is soft  Tenderness: There is no abdominal tenderness  Comments: Intact midline sticthes, LLQ covered   Musculoskeletal:      Cervical back: Neck supple  Right lower leg: Edema present  Left lower leg: Edema present  Skin:     General: Skin is warm and dry  Neurological:      Mental Status: She is alert and oriented to person, place, and time     Psychiatric:         Mood and Affect: Mood normal          Laboratory and Diagnostics:  Results from last 7 days   Lab Units 06/27/21  0459 06/26/21  0330 06/25/21  0459 06/24/21  0532 06/23/21  0454 06/22/21  1602 06/22/21  0512 06/21/21  1640   WBC Thousand/uL 3 89* 6 70 5 51 8 20 8 80 8 20 4 40* 5 70   HEMOGLOBIN g/dL 10 0* 11 9 12 4 12 0 12 4 10 2* 5 6* 8 1*   HEMATOCRIT % 30 0* 35 0 36 3 34 3* 36 3 28 8* 15 9* 23 8*   PLATELETS Thousands/uL 142* 215 215 208 182 176 142* 236   NEUTROS PCT %  --   --   --   --   --   --  87* 85*   BANDS PCT %  --   --  3 3  --  7  --   --    MONOS PCT %  --   --   --   --   --   --  4 7   MONO PCT %  --  1* 2* 1  --  1  --   --      Results from last 7 days   Lab Units 06/27/21  0459 06/26/21  0330 06/25/21  0459 06/24/21  0532 06/23/21  0454 06/22/21  1602 06/22/21  0509 06/21/21  1640   SODIUM mmol/L 139 138 139 137 131* 134* 131* 135*   POTASSIUM mmol/L 3 4* 4 0 4 1 3 2* 3 6 3 5* 3 8 2 9*   CHLORIDE mmol/L 106 105 106 105 104 106 108 105   CO2 mmol/L 20* 22 22 24 20* 24 18* 24   ANION GAP mmol/L 13 11 11 8 7 4* 5 6   BUN mg/dL 32* 28* 21 21 15 15 9 11   CREATININE mg/dL 0 86 1 06 0 98 0 78 0 74 0 73 0 45* 0 59*   CALCIUM mg/dL 8 1* 8 1* 8 2* 8 4 8 3* 8 5 7 0* 7 8*   GLUCOSE RANDOM mg/dL 97 112 91 103* 116* 93 64* 120*   ALT U/L 20 20 24  --  13  --   --  15   AST U/L 23 32 57*  --  73*  --   --  31   ALK PHOS U/L 48 49 54  --  44  --   --  42*   ALBUMIN g/dL 1 6* 1 6* 1 9*  --  2 4*  --   --  2 0*   TOTAL BILIRUBIN mg/dL 0 36 0 55 0 62  --  0 48  --   --  0 26     Results from last 7 days   Lab Units 06/27/21  0459 06/26/21  0330 06/25/21  0459 06/23/21  0454   MAGNESIUM mg/dL 2 2 1 9 2 2 1 4*   PHOSPHORUS mg/dL 3 8 5 1*  --  3 2      Results from last 7 days   Lab Units 06/22/21  1602   INR  1 75*   PTT seconds 54*  64*      Results from last 7 days   Lab Units 06/23/21  1140 06/23/21  0833 06/23/21  0529   TROPONIN I ng/mL 2 41* 2 88* 2 97*     Results from last 7 days   Lab Units 06/23/21  1140   LACTIC ACID mmol/L 1 3     ABG:    VBG:    Results from last 7 days   Lab Units 06/26/21  0806 06/25/21  0500   PROCALCITONIN ng/ml 2 06* 2 47*       Micro  Results from last 7 days   Lab Units 06/24/21  1304 06/23/21  1609 06/23/21  0832 06/23/21  0831   BLOOD CULTURE   --   --  No Growth at 72 hrs  No Growth at 72 hrs  SPUTUM CULTURE   --  3+ Growth of Candida albicans*  3+ Growth of   --   --    GRAM STAIN RESULT   --  1+ Epithelial cells per low power field*  No polys seen*  Rare Gram negative rods*  Rare Gram positive rods*  --   --    MRSA CULTURE ONLY  No Methicillin Resistant Staphlyococcus aureus (MRSA) isolated  --   --   --        EKG: No new EKG  Imaging: I have personally reviewed pertinent reports  Intake and Output  I/O       06/25 0701 - 06/26 0700 06/26 0701 - 06/27 0700    P  O   180    IV Piggyback 550 50    Total Intake(mL/kg) 550 (10) 230 (4 2)    Urine (mL/kg/hr) 971 (0 7) 450 (0 3)    Total Output 971 450    Net -421 -220                Height and Weights   Height: 5' 3" (160 cm)  IBW (Ideal Body Weight): 52 4 kg  Body mass index is 21 44 kg/m²    Weight (last 2 days)     Date/Time Weight    06/25/21 0450   54 9 (121 03)                Nutrition       Diet Orders   (From admission, onward)             Start     Ordered    06/26/21 1704  Dietary nutrition supplements  Once     Question Answer Comment   Select Supplement: Ensure Enlive-Vanilla    Frequency 10am        06/26/21 1704    06/26/21 1704  Dietary nutrition supplements  Once     Question Answer Comment   Select Supplement: Ensure Enlive-Strawberry    Frequency 2pm        06/26/21 1704    06/26/21 0814  Diet Surgical; Surgical Soft/Lite Meal  Effective tomorrow     Question Answer Comment   Diet Type Surgical    Surgical Surgical Soft/Lite Meal    RD to adjust diet per protocol? No        06/26/21 0814                  Active Medications  Scheduled Meds:  Current Facility-Administered Medications   Medication Dose Route Frequency Provider Last Rate    budesonide  0 5 mg Nebulization Q12H Jane Berg MD      cefepime  2,000 mg Intravenous Q12H LAMONTE Ga 2,000 mg (06/26/21 2218)    heparin (porcine)  5,000 Units Subcutaneous Q8H [address] LAMONTE Ga      ipratropium-albuterol  3 mL Nebulization Q6H Sydnie Salcedo MD      nicotine  14 mg Transdermal Daily LAMONTE Ga      ondansetron  4 mg Intravenous Q8H PRN LAMONTE Ga      oxyCODONE  5 mg Oral Q6H [address] Jane Hall MD      oxyCODONE-acetaminophen  1 tablet Oral Q4H PRN LAMONTE Ga      pantoprazole  40 mg Oral Early Morning Vinod Rodriguez MD      sertraline  100 mg Oral Daily Jane Berg MD       Continuous Infusions:     PRN Meds:   ondansetron, 4 mg, Q8H PRN  oxyCODONE-acetaminophen, 1 tablet, Q4H PRN        Invasive Devices Review  Invasive Devices     Peripheral Intravenous Line            Peripheral IV 06/25/21 Left; Lower Arm 2 days              ---------------------------------------------------------------------------------------  Advance Directive and Living Will:      Power of : POLST:    ---------------------------------------------------------------------------------------  Care Time Delivered:   No Critical Care time spent       Lanelle Canavan, MD      Portions of the record may have been created with voice recognition software  Occasional wrong word or "sound a like" substitutions may have occurred due to the inherent limitations of voice recognition software    Read the chart carefully and recognize, using context, where substitutions have occurred

## 2021-06-27 NOTE — ASSESSMENT & PLAN NOTE
· S/p sigmoid resection colon resection  Was admitted to [place] where she was noted to have fatigue from work of breathing and placed on BiPAP  S/p solumedrol, lasix , and stopped IVF  · Transferred to [facility] 6/25 for acute resp failure, initially on BiPap then tolerated mid-flow  HS bipap 14/6 70% overnight  · Likely mucus plugging vs pleural effusion secondary to abdominal pathology vs pneumonia possible HCAP   · On exam no wheezing or overload noted so only placed on albuterol, no steroids or lasix given  · Imaging shows atelectasis, pleural effusion, emphysematous changes   · CTA PE negative  · d/tiffanie Vanco and flagyl  · Continue cefepime D3, Total Day 7  Dc cefepime?   · Continue Duo neb  · Continue pulmocort  · Vest therapy  · Continue to wean 02 as needed, currently on midflow, will suggest transfer to floor with pulm following

## 2021-06-28 PROBLEM — D61.818 PANCYTOPENIA (HCC): Status: ACTIVE | Noted: 2021-06-28

## 2021-06-28 LAB
ANION GAP SERPL CALCULATED.3IONS-SCNC: 10 MMOL/L (ref 4–13)
BACTERIA BLD CULT: NORMAL
BACTERIA BLD CULT: NORMAL
BUN SERPL-MCNC: 21 MG/DL (ref 5–25)
CALCIUM SERPL-MCNC: 7.8 MG/DL (ref 8.3–10.1)
CHLORIDE SERPL-SCNC: 105 MMOL/L (ref 100–108)
CO2 SERPL-SCNC: 23 MMOL/L (ref 21–32)
CREAT SERPL-MCNC: 0.72 MG/DL (ref 0.6–1.3)
ERYTHROCYTE [DISTWIDTH] IN BLOOD BY AUTOMATED COUNT: 18.6 % (ref 11.6–15.1)
GFR SERPL CREATININE-BSD FRML MDRD: 91 ML/MIN/1.73SQ M
GLUCOSE SERPL-MCNC: 64 MG/DL (ref 65–140)
GLUCOSE SERPL-MCNC: 75 MG/DL (ref 65–140)
GLUCOSE SERPL-MCNC: 79 MG/DL (ref 65–140)
GLUCOSE SERPL-MCNC: 80 MG/DL (ref 65–140)
GLUCOSE SERPL-MCNC: 81 MG/DL (ref 65–140)
GLUCOSE SERPL-MCNC: 97 MG/DL (ref 65–140)
HCT VFR BLD AUTO: 30.3 % (ref 34.8–46.1)
HGB BLD-MCNC: 10.2 G/DL (ref 11.5–15.4)
IMM EOSINOPHIL NFR BLD MANUAL: 6 % (ref 0–6)
LYMPHOCYTES NFR BLD: 11 % (ref 14–44)
MCH RBC QN AUTO: 34 PG (ref 26.8–34.3)
MCHC RBC AUTO-ENTMCNC: 33.7 G/DL (ref 31.4–37.4)
MCV RBC AUTO: 101 FL (ref 82–98)
MONOCYTES NFR BLD AUTO: 1 % (ref 4–12)
NEUTS SEG NFR BLD AUTO: 82 % (ref 45–77)
NRBC BLD AUTO-RTO: 0 /100 WBC (ref 0–2)
PATHOLOGIST INTERPRETATION: NORMAL
PLATELET # BLD AUTO: 103 THOUSANDS/UL (ref 149–390)
PLATELET BLD QL SMEAR: ADEQUATE
PMV BLD AUTO: 9.4 FL (ref 8.9–12.7)
POTASSIUM SERPL-SCNC: 3.5 MMOL/L (ref 3.5–5.3)
RBC # BLD AUTO: 3 MILLION/UL (ref 3.81–5.12)
RBC MORPH BLD: NORMAL
SODIUM SERPL-SCNC: 138 MMOL/L (ref 136–145)
TOTAL CELLS COUNTED SPEC: 100
TOXIC GRANULES BLD QL SMEAR: PRESENT
WBC # BLD AUTO: 3.01 THOUSAND/UL (ref 4.31–10.16)

## 2021-06-28 PROCEDURE — 99232 SBSQ HOSP IP/OBS MODERATE 35: CPT | Performed by: INTERNAL MEDICINE

## 2021-06-28 PROCEDURE — 94760 N-INVAS EAR/PLS OXIMETRY 1: CPT

## 2021-06-28 PROCEDURE — 94640 AIRWAY INHALATION TREATMENT: CPT

## 2021-06-28 PROCEDURE — 80048 BASIC METABOLIC PNL TOTAL CA: CPT | Performed by: INTERNAL MEDICINE

## 2021-06-28 PROCEDURE — 99024 POSTOP FOLLOW-UP VISIT: CPT | Performed by: SURGERY

## 2021-06-28 PROCEDURE — 82948 REAGENT STRIP/BLOOD GLUCOSE: CPT

## 2021-06-28 PROCEDURE — 99233 SBSQ HOSP IP/OBS HIGH 50: CPT | Performed by: INTERNAL MEDICINE

## 2021-06-28 PROCEDURE — 85007 BL SMEAR W/DIFF WBC COUNT: CPT | Performed by: INTERNAL MEDICINE

## 2021-06-28 PROCEDURE — 85027 COMPLETE CBC AUTOMATED: CPT | Performed by: INTERNAL MEDICINE

## 2021-06-28 PROCEDURE — 94669 MECHANICAL CHEST WALL OSCILL: CPT

## 2021-06-28 PROCEDURE — 94668 MNPJ CHEST WALL SBSQ: CPT

## 2021-06-28 RX ORDER — FUROSEMIDE 10 MG/ML
20 INJECTION INTRAMUSCULAR; INTRAVENOUS ONCE
Status: COMPLETED | OUTPATIENT
Start: 2021-06-28 | End: 2021-06-28

## 2021-06-28 RX ADMIN — SERTRALINE HYDROCHLORIDE 100 MG: 100 TABLET ORAL at 09:19

## 2021-06-28 RX ADMIN — PANTOPRAZOLE SODIUM 40 MG: 40 TABLET, DELAYED RELEASE ORAL at 06:34

## 2021-06-28 RX ADMIN — OXYCODONE HYDROCHLORIDE 5 MG: 5 TABLET ORAL at 23:59

## 2021-06-28 RX ADMIN — HEPARIN SODIUM 5000 UNITS: 5000 INJECTION INTRAVENOUS; SUBCUTANEOUS at 15:04

## 2021-06-28 RX ADMIN — IPRATROPIUM BROMIDE AND ALBUTEROL SULFATE 3 ML: 2.5; .5 SOLUTION RESPIRATORY (INHALATION) at 07:05

## 2021-06-28 RX ADMIN — IPRATROPIUM BROMIDE AND ALBUTEROL SULFATE 3 ML: 2.5; .5 SOLUTION RESPIRATORY (INHALATION) at 00:04

## 2021-06-28 RX ADMIN — Medication 14 MG: at 09:19

## 2021-06-28 RX ADMIN — BUDESONIDE 0.5 MG: 0.5 INHALANT ORAL at 07:05

## 2021-06-28 RX ADMIN — FUROSEMIDE 20 MG: 10 INJECTION, SOLUTION INTRAMUSCULAR; INTRAVENOUS at 11:21

## 2021-06-28 RX ADMIN — OXYCODONE HYDROCHLORIDE 5 MG: 5 TABLET ORAL at 17:18

## 2021-06-28 RX ADMIN — CEFEPIME HYDROCHLORIDE 2000 MG: 2 INJECTION, POWDER, FOR SOLUTION INTRAVENOUS at 22:31

## 2021-06-28 RX ADMIN — HEPARIN SODIUM 5000 UNITS: 5000 INJECTION INTRAVENOUS; SUBCUTANEOUS at 06:34

## 2021-06-28 RX ADMIN — IPRATROPIUM BROMIDE AND ALBUTEROL SULFATE 3 ML: 2.5; .5 SOLUTION RESPIRATORY (INHALATION) at 13:04

## 2021-06-28 RX ADMIN — IPRATROPIUM BROMIDE AND ALBUTEROL SULFATE 3 ML: 2.5; .5 SOLUTION RESPIRATORY (INHALATION) at 19:57

## 2021-06-28 RX ADMIN — OXYCODONE HYDROCHLORIDE 5 MG: 5 TABLET ORAL at 11:21

## 2021-06-28 RX ADMIN — CEFEPIME HYDROCHLORIDE 2000 MG: 2 INJECTION, POWDER, FOR SOLUTION INTRAVENOUS at 10:37

## 2021-06-28 RX ADMIN — OXYCODONE HYDROCHLORIDE 5 MG: 5 TABLET ORAL at 06:34

## 2021-06-28 RX ADMIN — BUDESONIDE 0.5 MG: 0.5 INHALANT ORAL at 19:57

## 2021-06-28 RX ADMIN — HEPARIN SODIUM 5000 UNITS: 5000 INJECTION INTRAVENOUS; SUBCUTANEOUS at 22:31

## 2021-06-28 NOTE — CASE MANAGEMENT
CM met with pt at bedside to discuss discharge needs  Pt reports that she does not need a BSC as her bathroom is very close  She is open to having a RW; CM will order  Pt agreeable to HHPT; agreeable to a referral being sent to [facility]  CM will submit and will advise if they are able to accept  Pt will have transportation home when medically stable  No other needs expressed or identified  CM will continue to follow

## 2021-06-28 NOTE — ASSESSMENT & PLAN NOTE
Diverticulitis complicated by abscess, nonhealing myocutaneous fistula despite medication intervention and drain  POD 5 sigmoid colon resection, left salpingectomy with splenic flexure mobilization     Pain control as ordered  Continue antibiotics  Infectious disease following

## 2021-06-28 NOTE — ASSESSMENT & PLAN NOTE
Malnutrition Findings:   Adult Malnutrition type: Chronic illness  Adult Degree of Malnutrition: Malnutrition of moderate degree    BMI Findings: Body mass index is 21 44 kg/m²       As per GI surgery, surgical lite diet, advance as GI suggests  Nutrition on consult, appreciate recs

## 2021-06-28 NOTE — PLAN OF CARE
Problem: MOBILITY - ADULT  Goal: Maintain or return to baseline ADL function  Description: INTERVENTIONS:  -  Assess patient's ability to carry out ADLs; assess patient's baseline for ADL function and identify physical deficits which impact ability to perform ADLs (bathing, care of mouth/teeth, toileting, grooming, dressing, etc )  - Assess/evaluate cause of self-care deficits   - Assess range of motion  - Assess patient's mobility; develop plan if impaired  - Assess patient's need for assistive devices and provide as appropriate  - Encourage maximum independence but intervene and supervise when necessary  - Involve family in performance of ADLs  - Assess for home care needs following discharge   - Consider OT consult to assist with ADL evaluation and planning for discharge  - Provide patient education as appropriate  Outcome: Progressing  Goal: Maintains/Returns to pre admission functional level  Description: INTERVENTIONS:  - Perform BMAT or MOVE assessment daily    - Set and communicate daily mobility goal to care team and patient/family/caregiver  - Collaborate with rehabilitation services on mobility goals if consulted  - Perform Range of Motion 4 times a day    - Dangle patient 3 times a day  - Stand patient 3 times a day  - Ambulate patient 3 times a day  - Out of bed to chair 3 times a day   - Out of bed for meals 3 times a day  - Out of bed for toileting  - Record patient progress and toleration of activity level   Outcome: Progressing     Problem: Potential for Falls  Goal: Patient will remain free of falls  Description: INTERVENTIONS:  - Educate patient/family on patient safety including physical limitations  - Instruct patient to call for assistance with activity   - Consult OT/PT to assist with strengthening/mobility   - Keep Call bell within reach  - Keep bed low and locked with side rails adjusted as appropriate  - Keep care items and personal belongings within reach  - Initiate and maintain comfort rounds  - Make Fall Risk Sign visible to staff  - Offer Toileting every hour, in advance of need  - Initiate/Maintain bed alarm  - Apply yellow socks and bracelet for high fall risk patients  - Consider moving patient to room near nurses station  Outcome: Progressing     Problem: Prexisting or High Potential for Compromised Skin Integrity  Goal: Skin integrity is maintained or improved  Description: INTERVENTIONS:  - Identify patients at risk for skin breakdown  - Assess and monitor skin integrity  - Assess and monitor nutrition and hydration status  - Monitor labs   - Assess for incontinence   - Turn and reposition patient  - Assist with mobility/ambulation  - Relieve pressure over bony prominences  - Avoid friction and shearing  - Provide appropriate hygiene as needed including keeping skin clean and dry  - Evaluate need for skin moisturizer/barrier cream  - Collaborate with interdisciplinary team   - Patient/family teaching  - Consider wound care consult   Outcome: Progressing     Problem: RESPIRATORY - ADULT  Goal: Achieves optimal ventilation and oxygenation  Description: INTERVENTIONS:  - Assess for changes in respiratory status  - Assess for changes in mentation and behavior  - Position to facilitate oxygenation and minimize respiratory effort  - Oxygen administered by appropriate delivery if ordered  - Initiate smoking cessation education as indicated  - Encourage broncho-pulmonary hygiene including cough, deep breathe, Incentive Spirometry  - Assess the need for suctioning and aspirate as needed  - Assess and instruct to report SOB or any respiratory difficulty  - Respiratory Therapy support as indicated  Outcome: Progressing     Problem: GASTROINTESTINAL - ADULT  Goal: Minimal or absence of nausea and/or vomiting  Description: INTERVENTIONS:  - Administer IV fluids if ordered to ensure adequate hydration  - Maintain NPO status until nausea and vomiting are resolved  - Administer ordered antiemetic medications as needed  - Provide nonpharmacologic comfort measures as appropriate  - Advance diet as tolerated, if ordered  - Consider nutrition services referral to assist patient with adequate nutrition and appropriate food choices  Outcome: Progressing  Goal: Maintains or returns to baseline bowel function  Description: INTERVENTIONS:  - Assess bowel function  - Encourage oral fluids to ensure adequate hydration  - Administer IV fluids if ordered to ensure adequate hydration  - Administer ordered medications as needed  - Encourage mobilization and activity  - Consider nutritional services referral to assist patient with adequate nutrition and appropriate food choices  Outcome: Progressing  Goal: Maintains adequate nutritional intake  Description: INTERVENTIONS:  - Monitor percentage of each meal consumed  - Identify factors contributing to decreased intake, treat as appropriate  - Assist with meals as needed  - Monitor I&O, weight, and lab values if indicated  - Obtain nutrition services referral as needed  Outcome: Progressing     Problem: METABOLIC, FLUID AND ELECTROLYTES - ADULT  Goal: Fluid balance maintained  Description: INTERVENTIONS:  - Monitor labs   - Monitor I/O and WT  - Instruct patient on fluid and nutrition as appropriate  - Assess for signs & symptoms of volume excess or deficit  Outcome: Progressing     Problem: SKIN/TISSUE INTEGRITY - ADULT  Goal: Incision(s), wounds(s) or drain site(s) healing without S/S of infection  Description: INTERVENTIONS  - Assess and document dressing, incision, wound bed, drain sites and surrounding tissue  - Provide patient and family education  - Perform skin care/dressing changes   Outcome: Progressing     Problem: HEMATOLOGIC - ADULT  Goal: Maintains hematologic stability  Description: INTERVENTIONS  - Assess for signs and symptoms of bleeding or hemorrhage  - Monitor labs  - Administer supportive blood products/factors as ordered and appropriate  Outcome: Progressing     Problem: Nutrition/Hydration-ADULT  Goal: Nutrient/Hydration intake appropriate for improving, restoring or maintaining nutritional needs  Description: Monitor and assess patient's nutrition/hydration status for malnutrition  Collaborate with interdisciplinary team and initiate plan and interventions as ordered  Monitor patient's weight and dietary intake as ordered or per policy  Utilize nutrition screening tool and intervene as necessary  Determine patient's food preferences and provide high-protein, high-caloric foods as appropriate       INTERVENTIONS:  - Monitor oral intake, urinary output, labs, and treatment plans  - Assess nutrition and hydration status and recommend course of action  - Evaluate amount of meals eaten  - Assist patient with eating if necessary   - Allow adequate time for meals  - Recommend/ encourage appropriate diets, oral nutritional supplements, and vitamin/mineral supplements  - Order, calculate, and assess calorie counts as needed  - Recommend, monitor, and adjust tube feedings and TPN/PPN based on assessed needs  - Assess need for intravenous fluids  - Provide specific nutrition/hydration education as appropriate  - Include patient/family/caregiver in decisions related to nutrition  Outcome: Progressing

## 2021-06-28 NOTE — ASSESSMENT & PLAN NOTE
Suspected due to postoperative/perioperative aspiration, treated with BiPAP, supplemental oxygen, IV antibiotics, Pulmonary consult, improved and presently on 6 L of oxygen  Wean oxygen as able  Continue Pulmicort nebs b i d , bronchial hygiene  Will trial IV Lasix 20 mg today

## 2021-06-28 NOTE — PROGRESS NOTES
Progress Note - Infectious Disease   Marlene Lindsay 62 y o  female MRN: [id number]  Unit/Bed#: E2 -01 Encounter: [id number]      Impression/Plan:    1- Acute hypoxic respiratory failure:  Developed on postop day 2 after open sigmoid colon resection and left salpingectomy;   This may be postoperative/perioperative aspiration pneumonia  Patient is clinically much improved on antibiotic  O2 support much decreased; and on nasal cannula 5 liters/minute  Sputum culture with mixed respiratory penelope; Flu, RSV, COVID-19 PCR negative  - suspect respiratory failure multifactorial   Concern for pulmonary edema in setting of elevated troponin   Concern for mucous plugging with subsequent atelectasis   Concern for pneumonia  -  sputum culture with mixed respiratory penelope   No MRSA   -  continue cefepime to finish 7 days course through 06/29/2021  [facility] pulmonary follow-up and monitoring of respiratory status     2- Suspected pneumonia:  Probable perioperative/postoperative aspirate  Patient is clinically much improved   Respiratory culture with mixed penelope, not helpful   - antibiotic as above  - monitor respiratory symptoms     3- complicated diverticulitis:  With recurrent abscess and colocutaneous fistula  Now postop day 7 for open resection of sigmoid colon, left salpingectomy  Operative report reviewed, extensive phlegmonous changes and adhesions noted  Surgical wound with no signs of infection at this time  - frequent abdominal exams  - pain control as per primary service team and General surgery  - close general surgery follow-up     4- suspected COPD:  Patient with chronic smoking  Pulmonary evaluation reviewed, currently off steroids  - consider avoiding systemic corticosteroid treatment in setting of recent extensive surgical procedure as mentioned above    [facility] pulmonary team follow-up     5- nicotine dependence:  Stressed need for complete smoking cessation after hospital discharge      6- troponin elevation:  Possible non MI troponin elevation setting of acute hypoxic respiratory failure  - continue close monitoring of hemodynamics status  - cardiology follow-up     7- leukopenia and thrombocytopenia:  Possibly related to antibiotic use  - last dose of cefepime in a m   - follow-up CBC with differential, if no improvement in leukopenia and thrombocytopenia after antibiotic stopped, consider consulting Hematology Service    Discussed with patient and family in detail regarding the above plan      Antibiotics:  Cefepime day 6    Subjective:  Reports significant overall improvement  Denies pain  Denies nausea or vomiting  Reports that her shortness of breath has been progressively improving  She is expect treating clear phlegm, particularly after using flutter valve    Objective:  Vitals:  Temp:  [97 9 °F (36 6 °C)-98 3 °F (36 8 °C)] 98 1 °F (36 7 °C)  HR:  [84-89] 88  Resp:  [18-20] 18  BP: (107-125)/(66-85) 109/66  SpO2:  [90 %-99 %] 99 %  Temp (24hrs), Av 1 °F (36 7 °C), Min:97 9 °F (36 6 °C), Max:98 3 °F (36 8 °C)  Current: Temperature: 98 1 °F (36 7 °C)    Physical Exam:   General Appearance:  Alert, interactive, nontoxic, no acute distress  Throat: Oropharynx moist without lesions  Lungs:   Decreased air entry at bases bilaterally, no wheezing  Patient currently on nasal cannula 5 liters/minute   Heart:  RRR; no murmur, rub or gallop   Abdomen:   Soft, nontender, incision is healing well with no dehiscence or drainage  Site of previous colocutaneous fistula is currently packed  No surrounding erythema    Extremities: No clubbing, cyanosis    Skin: No new rashes or lesions  No draining wounds noted         Labs, Imaging, & Other studies:   All pertinent labs and imaging studies were personally reviewed  Results from last 7 days   Lab Units 21  0520 21  0459 21  0330   WBC Thousand/uL 3 01* 3 89* 6 70   HEMOGLOBIN g/dL 10 2* 10 0* 11 9   PLATELETS Thousands/uL 103* 142* 215     Results from last 7 days   Lab Units 06/28/21  0520 06/27/21  0459 06/26/21  0330 06/25/21  0459   SODIUM mmol/L 138 139 138 139   POTASSIUM mmol/L 3 5 3 4* 4 0 4 1   CHLORIDE mmol/L 105 106 105 106   CO2 mmol/L 23 20* 22 22   BUN mg/dL 21 32* 28* 21   CREATININE mg/dL 0 72 0 86 1 06 0 98   EGFR ml/min/1 73sq m 91 74 57 63   CALCIUM mg/dL 7 8* 8 1* 8 1* 8 2*   AST U/L  --  23 32 57*   ALT U/L  --  20 20 24   ALK PHOS U/L  --  48 49 54     Results from last 7 days   Lab Units 06/24/21  1304 06/23/21  1609 06/23/21  0832 06/23/21  0831   BLOOD CULTURE   --   --  No Growth After 4 Days  No Growth After 4 Days     SPUTUM CULTURE   --  3+ Growth of Candida albicans*  3+ Growth of   --   --    GRAM STAIN RESULT   --  1+ Epithelial cells per low power field*  No polys seen*  Rare Gram negative rods*  Rare Gram positive rods*  --   --    MRSA CULTURE ONLY  No Methicillin Resistant Staphlyococcus aureus (MRSA) isolated  --   --   --      Results from last 7 days   Lab Units 06/26/21  0806 06/25/21  0500   PROCALCITONIN ng/ml 2 06* 2 47*

## 2021-06-28 NOTE — PROGRESS NOTES
[facility]  Progress Note - Melo Medellin 1960, 62 y o  female MRN: [id number]  Unit/Bed#: E2 -01 Encounter: [id number]  Primary Care Provider: Madeline Arreaga DO   Date and time admitted to hospital: 6/25/2021  5:60 AM    complicated diverticulitis, colocutaneous fistula, s/p sigmoid colon resection  Assessment & Plan  Diverticulitis complicated by abscess, nonhealing myocutaneous fistula despite medication intervention and drain  POD 5 sigmoid colon resection, left salpingectomy with splenic flexure mobilization  Pain control as ordered  Continue antibiotics  Infectious disease following      * Acute respiratory failure with hypoxia (HCC)  Assessment & Plan  Suspected due to postoperative/perioperative aspiration, treated with BiPAP, supplemental oxygen, IV antibiotics, Pulmonary consult, improved and presently on 6 L of oxygen  Wean oxygen as able  Continue Pulmicort nebs b i d , bronchial hygiene  Will trial IV Lasix 20 mg today        Pancytopenia (HCC)  Assessment & Plan  Present WBC of 3 01, thrombocytopenia 103, hemoglobin of 10 2, possible causes include infection, antibiotics  Send peripheral smear  4T-score is 1 point, low probability of hit  Monitor daily      Nicotine dependence  Assessment & Plan  Continue nicotine patch    Moderate protein-calorie malnutrition (HCC)  Assessment & Plan  Malnutrition Findings:   Adult Malnutrition type: Chronic illness  Adult Degree of Malnutrition: Malnutrition of moderate degree    BMI Findings: Body mass index is 21 44 kg/m²  As per GI surgery, surgical lite diet, advance as GI suggests  Nutrition on consult, appreciate recs    Depression  Assessment & Plan  Continue sertraline 100 mg daily      VTE Pharmacologic Prophylaxis: VTE Score: 7 (Simultaneous filing  User may not have seen previous data ) Moderate Risk (Score 3-4) - Pharmacological DVT Prophylaxis Ordered: heparin      Patient Centered Rounds: I performed bedside rounds with nursing staff today  Discussions with Specialists or Other Care Team Provider:     Education and Discussions with Family / Patient: Patient declined call to   Time Spent for Care: 30 minutes  More than 50% of total time spent on counseling and coordination of care as described above  Current Length of Stay: 3 day(s)  Current Patient Status: Inpatient   Certification Statement: The patient will continue to require additional inpatient hospital stay due to Acute hypoxemic respiratory failure  Discharge Plan: Anticipate discharge in 48 hrs to discharge location to be determined pending rehab evaluations  Code Status: Level 1 - Full Code    Subjective:   Seen this morning  States that she feels well  No new complaints to offer  Denies worsening shortness of breath, no cough or chest pain  No abdominal pain, having bowel movements    Objective:     Vitals:   Temp (24hrs), Av 1 °F (36 7 °C), Min:97 9 °F (36 6 °C), Max:98 3 °F (36 8 °C)    Temp:  [97 9 °F (36 6 °C)-98 3 °F (36 8 °C)] 98 1 °F (36 7 °C)  HR:  [84-89] 85  Resp:  [18-20] 18  BP: (107-125)/(68-85) 119/84  SpO2:  [90 %-96 %] 95 %  Body mass index is 21 44 kg/m²  Input and Output Summary (last 24 hours): Intake/Output Summary (Last 24 hours) at 2021 1110  Last data filed at 2021 0120  Gross per 24 hour   Intake --   Output 1050 ml   Net -1050 ml       Physical Exam:   Physical Exam  Vitals and nursing note reviewed  Constitutional:       General: She is not in acute distress  Appearance: She is well-developed  HENT:      Head: Normocephalic and atraumatic  Eyes:      Conjunctiva/sclera: Conjunctivae normal    Cardiovascular:      Rate and Rhythm: Normal rate and regular rhythm  Heart sounds: No murmur heard  Pulmonary:      Effort: Pulmonary effort is normal  No respiratory distress        Comments: Breath sounds are reduced in the bases otherwise clear  Abdominal: Palpations: Abdomen is soft  Tenderness: There is no abdominal tenderness  Comments: Midline incision noted, well apposed, no drainage   Musculoskeletal:      Cervical back: Neck supple  Skin:     General: Skin is warm and dry  Neurological:      General: No focal deficit present  Mental Status: She is alert and oriented to person, place, and time  Mental status is at baseline  Cranial Nerves: No cranial nerve deficit  Motor: No weakness  Additional Data:     Labs:  Results from last 7 days   Lab Units 06/28/21  0520 06/26/21  0330 06/25/21  0459 06/22/21  1602 06/22/21  0512   WBC Thousand/uL 3 01* 6 70 5 51  --  4 40*   HEMOGLOBIN g/dL 10 2* 11 9 12 4  --  5 6*   HEMATOCRIT % 30 3* 35 0 36 3  --  15 9*   PLATELETS Thousands/uL 103* 215 215  --  142*   BANDS PCT %  --   --  3  --   --    NEUTROS PCT %  --   --   --   --  87*   LYMPHS PCT %  --   --   --   --  9*   LYMPHO PCT %  --  11* 5*   < >  --    MONOS PCT %  --   --   --   --  4   MONO PCT %  --  1* 2*   < >  --    EOS PCT %  --  0 0   < > 0    < > = values in this interval not displayed       Results from last 7 days   Lab Units 06/28/21  0520 06/27/21  0459   SODIUM mmol/L 138 139   POTASSIUM mmol/L 3 5 3 4*   CHLORIDE mmol/L 105 106   CO2 mmol/L 23 20*   BUN mg/dL 21 32*   CREATININE mg/dL 0 72 0 86   ANION GAP mmol/L 10 13   CALCIUM mg/dL 7 8* 8 1*   ALBUMIN g/dL  --  1 6*   TOTAL BILIRUBIN mg/dL  --  0 36   ALK PHOS U/L  --  48   ALT U/L  --  20   AST U/L  --  23   GLUCOSE RANDOM mg/dL 75 97     Results from last 7 days   Lab Units 06/22/21  1602   INR  1 75*     Results from last 7 days   Lab Units 06/28/21  0953 06/28/21  0638 06/27/21  2045 06/27/21  1150 06/27/21  0759 06/26/21  2159 06/26/21  1551 06/26/21  1109 06/26/21  0805 06/25/21  2122 06/25/21  1550   POC GLUCOSE mg/dl 80 64* 112 118 85 133 133 100 97 114 160*         Results from last 7 days   Lab Units 06/26/21  0806 06/25/21  0500 06/23/21  1140 LACTIC ACID mmol/L  --   --  1 3   PROCALCITONIN ng/ml 2 06* 2 47*  --        Lines/Drains:  Invasive Devices     Peripheral Intravenous Line            Peripheral IV 06/25/21 Left; Lower Arm 3 days                  Telemetry:    Telemetry Reviewed: Heraclio Fierro Indication for Continued Telemetry Use: No indication for continued use  Will discontinue  Imaging: Personally reviewed the following imaging: chest xray    Recent Cultures (last 7 days):   Results from last 7 days   Lab Units 06/23/21  1609 06/23/21  0832 06/23/21  0831   BLOOD CULTURE   --  No Growth After 4 Days  No Growth After 4 Days  SPUTUM CULTURE  3+ Growth of Candida albicans*  3+ Growth of   --   --    GRAM STAIN RESULT  1+ Epithelial cells per low power field*  No polys seen*  Rare Gram negative rods*  Rare Gram positive rods*  --   --        Last 24 Hours Medication List:   Current Facility-Administered Medications   Medication Dose Route Frequency Provider Last Rate    budesonide  0 5 mg Nebulization Q12H Lory Hoff MD      cefepime  2,000 mg Intravenous Q12H Lory Hoff MD 2,000 mg (06/28/21 1037)    furosemide  20 mg Intravenous Once Cleveland Torres MD      heparin (porcine)  5,000 Units Subcutaneous Q8H [address] Lory Hoff MD      ipratropium-albuterol  3 mL Nebulization Q6H Lory Hoff MD      nicotine  14 mg Transdermal Daily Lory Hoff MD      ondansetron  4 mg Intravenous Q8H PRN Lory Hoff MD      oxyCODONE  5 mg Oral Q6H [address] Lory Hoff MD      oxyCODONE-acetaminophen  1 tablet Oral Q4H PRN Lory Hoff MD      pantoprazole  40 mg Oral Early Morning Lory Hoff MD      sertraline  100 mg Oral Daily Lory Hoff MD          Today, Patient Was Seen By: Cleveland Torres MD    **Please Note: This note may have been constructed using a voice recognition system  **

## 2021-06-28 NOTE — ASSESSMENT & PLAN NOTE
Present WBC of 3 01, thrombocytopenia 103, hemoglobin of 10 2, possible causes include infection, antibiotics  Send peripheral smear  4T-score is 1 point, low probability of hit  Monitor daily

## 2021-06-28 NOTE — UTILIZATION REVIEW
Initial Clinical Review  For [facility]  Admission: Date/Time/Statement:   Admission Orders (From admission, onward)     Ordered        06/25/21 0441  Inpatient Admission  Once                   Orders Placed This Encounter   Procedures    Inpatient Admission     Standing Status:   Standing     Number of Occurrences:   1     Order Specific Question:   Level of Care     Answer:   Level 1 Stepdown [13]     Order Specific Question:   Estimated length of stay     Answer:   More than 2 Midnights     Order Specific Question:   Certification     Answer:   I certify that inpatient services are medically necessary for this patient for a duration of greater than two midnights  See H&P and MD Progress Notes for additional information about the patient's course of treatment  Initial Presentation: 61 yo fem w/hx colon fistula transferred from [facility] unit to [address] [place] [address] level 1 ICU stepdown, admitted as inpatient due to acute hypoxic resp failure  Presented to [facility] on 6/21 for diverticulitis with paracolic abscess  She had sigmoidectomy/anastomosis on 6/21 with postop blood loss anemia requiring 3u prbc's  On day 2, she desat into mid 70's requiring 8li o2  She was found to have bilat effusions with poss pneumonia for which IV antbx were started  On day 3, she had worsening fatigue and by 10pm had bilat mid lung crepitations requiring IV diuresis and increased o2 to 12li  Sat at 84%  She was placed on NRB on top of NC, IV steroids, IV diuresis continued, but fatigue worsened and she was minimally responsive  bipap placed  Decision made to transfer to [facility] for higher level of care and critical care management  On arrival to [facility], suspecting mucus plugging vs  Pleural effusion vs  Pneumonia  Remains on 10li midflow o2, 84% sat  Persistently sob, minimal abdominal pain, yellow/green/red productive cough  Has normal breath sounds, abd is soft and nontender  She is feeling better  Surgery consulted  Per surgery: on NC w/o resp distress  Colocutaneous fistula site macerated, but no surrounding cellulitis  Midline incision CDI  Tolerating clears, passed gas  Continue wound care  PM Update: on 10li midflow o2, no acute distress  R base previously had absent breath sounds, there is now moderate air movement  abd has mild diffuse tenderness  No edema  It was thought she would need bronchoscopy; however, her lung has expanded and this is not needed  Date: 6/26   Day 2: (POD#5) suspect aspiration pneumonia and volume overload  tolerating midflow o2, feels improved, slight sputum production, good pain control  Lungs w/L basilar rales, improving rhonchi, no wheezing  abd incision CDI, LLQ fistula with drainage  Stop prednisone, add pulmicort nebs bid  Stop vanco, continue other antbx per ID  Tolerating diet, ok to transfer to med surg  Per ID: feeling better, dyspnea improved, cough is mostly weak and nonproductive  abd pain mild and controlled  Afebrile, no chills  Tolerating antbx  suspect multifactorial resp failure, concern for pulm edema in setting of elevated troponin, concern for mucus plug with atelectasis, concern for pneumonia  Stop vanco, continue cefepime, if no improvement, consider bronch to remove mucus plugs  Suspect that she has COPD due to chronic smoking, she is off steroids-consider avoiding systemic steroids due to recent extensive surgery       Temperature Pulse Respirations Blood Pressure SpO2   06/25/21 0450 06/25/21 0450 06/25/21 0450 06/25/21 0450 06/25/21 0450   97 7 °F (36 5 °C) 84 15 107/72 99 %      Temp Source Heart Rate Source Patient Position - Orthostatic VS BP Location FiO2 (%)   06/25/21 0450 06/25/21 0450 06/25/21 0450 06/25/21 0450 --   Temporal Monitor Lying Right arm       Pain Score       06/25/21 0527       3          Wt Readings from Last 1 Encounters:   06/25/21 54 9 kg (121 lb 0 5 oz)     Additional Vital Signs:   Date/Time  Temp  Pulse  Resp  BP MAP (mmHg)  SpO2  Calculated FIO2 (%) - Nasal Cannula  Nasal Cannula O2 Flow Rate (L/min)  O2 Device     06/26/21 1211  --  --  --  --  --  --  52  8 L/min  Mid flow nasal cannula     06/26/21 1100  97 7 °F (36 5 °C)  --  --  --  --  --  --  --  --     06/26/21 0930  --  82  20  95/62  72  92 %  --  --  --         Date/Time  Temp  Pulse  Resp BP MAP (mmHg) SpO2 Calculated FIO2 (%) - Nasal Cannula  Nasal Cannula O2 Flow Rate (L/min)  O2 Device     06/26/21 0726  --  --  -- -- -- -- 80  15 L/min  Mid flow nasal cannula     06/26/21 0715  --  80  22 105/71 -- 90 % --  --  --     06/26/21 0700  98 7 °F (37 1 °C)  --  -- -- -- -- --  --  --     06/26/21 0540  --  --  -- -- -- 97 % --  --  --     06/26/21 0400  --  --  -- -- -- 97 % --  --  --full face mask     06/26/21 0322  98 °F (36 7 °C)  88  26Abnormal  119/70 91 85 %Abnormal  --  --  --     06/26/21 0220  --  84  18 106/73 85 98 % --  --  --     06/26/21 0132  --  --  -- -- -- 98 % --  --  --     06/26/21 0120  --  74  27Abnormal  89/64Abnormal  71 98 % --  --  --     06/26/21 0020  --  76  27Abnormal  101/67 76 99 % --  --  --     06/26/21 0000  --  --  -- -- -- 99 % --  --  --full face mask     06/25/21 2349  97 8 °F (36 6 °C)  --  -- -- -- -- --  --  --     06/25/21 2320  --  78  32Abnormal  109/74 85 92 % --  --  --     06/25/21 2220  --  80  29Abnormal  96/62 71 92 % --  --  --     06/25/21 2120  --  82  23Abnormal  99/71 85 94 % --  --  --     06/25/21 2020  --  82  18 103/65 76 99 % --  --  --     06/25/21 2002  --  --  -- -- -- 95 % 80  15 L/min  Mid flow nasal cannula     06/25/21 1957  98 5 °F (36 9 °C)  --  -- -- -- -- --  --  --     06/25/21 1920  --  82  34Abnormal  114/75 92 91 % --  --  --     06/25/21 1820  --  74  17 95/67 75 91 % --  --  --     06/25/21 1720  --  78  20 105/70 79 91 % --  --  --     06/25/21 1615  --  82  22 119/78 -- 90 % --  --  --     06/25/21 1520  --  80  22 107/79 94 93 % --  --  --     06/25/21 1500  98 4 °F (36 9 °C)  -- -- -- -- -- --  --  --     06/25/21 1429  --  --  -- -- -- 92 % 80  15 L/min  Mid flow nasal cannula     06/25/21 1420  --  80  20 103/67 78 94 % --  --  --     06/25/21 1320  --  82  19 75/57Abnormal  62 95 % --  --  --     06/25/21 1220  --  82  21 96/64 77 95 % --  --  --     06/25/21 1100  97 8 °F (36 6 °C)  --  -- -- -- -- --  --  --     06/25/21 1040  --  88  20 115/75 89 85 %Abnormal  --  --  --     06/25/21 0820  --  88  22 104/71 89 92 % --  --  --     06/25/21 0754  --  --  -- -- -- 96 % --  --  --     06/25/21 0749  --  --  -- -- -- 96 % 80  15 L/min  Mid flow nasal cannula     06/25/21 0740  --  84  24Abnormal  -- -- 83 %Abnormal   --  --  --     06/25/21 0730  --  86  29Abnormal  -- -- 89 %Abnormal  --  --  --     06/25/21 0725  --  84  17 -- -- 98 % 44  6 L/min  Nasal cannula     06/25/21 0700  97 6 °F (36 4 °C)  --  -- -- -- -- --  --  --     06/25/21 0615  --  84  21 108/82 90 98 % --  --  --     06/25/21 0515  --  86  30Abnormal  129/81 98 98 % --  --  --         Pertinent Labs/Diagnostic Test Results:   6/23 PE study: No pulmonary embolism is seen    Superimposed on mild scattered pulmonary emphysematous changes, there are small bilateral pleural effusions, right greater than left  Attenuation of the bronchus intermedius extending into the right middle and lower lobe bronchi probably related to   mucous plugging, inspissated secretions, and/or aspiration  Associated atelectasis in the bilateral lower lobes as well as the lateral right middle lobe    Patchy alveolar and groundglass opacities in the posterior and peripheral right upper lobe with extensive groundglass and alveolar opacities in the left upper and lower lobes which could represent infection, inflammation, and/or edema (which could be   cardiogenic or noncardiogenic including ARDS)    Correlation with the patient's symptoms and laboratory values recommended    Small amount of ascites and intraperitoneal free air, probably related to recent intervention  6/23 PCXR: Extensive bilateral consolidation and groundglass opacity due to atelectasis and edema versus pneumonia on CT   Small right effusion  6/24 PCXR: Diffuse bilateral infiltrates  Minimal interval improvement allowing for improved inspiratory result      6/25 PCXR: Persistent moderate groundglass opacity    Ovoid opacity over the right base due to fluid in the fissure    Reexpansion of the right lower lobe      Results from last 7 days   Lab Units 06/24/21  1434   SARS-COV-2  Negative     Results from last 7 days   Lab Units 06/28/21  0520 06/27/21  0459 06/26/21  0330 06/25/21  0459 06/24/21  0532 06/22/21  1602 06/22/21  0512   WBC Thousand/uL 3 01* 3 89* 6 70 5 51 8 20 8 20 4 40*   HEMOGLOBIN g/dL 10 2* 10 0* 11 9 12 4 12 0 10 2* 5 6*   HEMATOCRIT % 30 3* 30 0* 35 0 36 3 34 3* 28 8* 15 9*   PLATELETS Thousands/uL 103* 142* 215 215 208 176 142*   NEUTROS ABS Thousands/µL  --   --   --   --   --   --  3 80   TOTAL NEUT ABS Thousand/uL  --   --   --   --  7 79 7 87*  --    BANDS PCT %  --   --   --  3 3 7  --          Results from last 7 days   Lab Units 06/28/21  0520 06/27/21  0459 06/26/21  0330 06/25/21  0459 06/24/21  0532 06/23/21  0454   SODIUM mmol/L 138 139 138 139 137 131*   POTASSIUM mmol/L 3 5 3 4* 4 0 4 1 3 2* 3 6   CHLORIDE mmol/L 105 106 105 106 105 104   CO2 mmol/L 23 20* 22 22 24 20*   ANION GAP mmol/L 10 13 11 11 8 7   BUN mg/dL 21 32* 28* 21 21 15   CREATININE mg/dL 0 72 0 86 1 06 0 98 0 78 0 74   EGFR ml/min/1 73sq m 91 74 57 63 83 88   CALCIUM mg/dL 7 8* 8 1* 8 1* 8 2* 8 4 8 3*   MAGNESIUM mg/dL  --  2 2 1 9 2 2  --  1 4*   PHOSPHORUS mg/dL  --  3 8 5 1*  --   --  3 2     Results from last 7 days   Lab Units 06/27/21  0459 06/26/21  0330 06/25/21  0459 06/23/21  0454 06/21/21  1640   AST U/L 23 32 57* 73* 31   ALT U/L 20 20 24 13 15   ALK PHOS U/L 48 49 54 44 42*   TOTAL PROTEIN g/dL 5 2* 5 5* 6 0* 5 1* 4 2*   ALBUMIN g/dL 1 6* 1 6* 1 9* 2 4* 2 0*   TOTAL BILIRUBIN mg/dL 0 36 0 55 0 62 0 48 0 26     Results from last 7 days   Lab Units 06/28/21  1142 06/28/21  0953 06/28/21  0638 06/27/21  2045 06/27/21  1150 06/27/21  0759 06/26/21  2159 06/26/21  1551 06/26/21  1109 06/26/21  0805 06/25/21  2122 06/25/21  1550   POC GLUCOSE mg/dl 97 80 64* 112 118 85 133 133 100 97 114 160*     Results from last 7 days   Lab Units 06/28/21  0520 06/27/21  0459 06/26/21  0330 06/25/21  0459 06/24/21  0532 06/23/21  0454 06/22/21  1602 06/22/21  0509 06/21/21  1640   GLUCOSE RANDOM mg/dL 75 97 112 91 103* 116* 93 64* 120*           Results from last 7 days   Lab Units 06/23/21  1140 06/23/21  0833 06/23/21  0529   TROPONIN I ng/mL 2 41* 2 88* 2 97*         Results from last 7 days   Lab Units 06/22/21  1602   PROTIME seconds 19 7*  20 5*   INR  1 75*   PTT seconds 54*  64*         Results from last 7 days   Lab Units 06/26/21  0806 06/25/21  0500   PROCALCITONIN ng/ml 2 06* 2 47*     Results from last 7 days   Lab Units 06/23/21  1140   LACTIC ACID mmol/L 1 3     Results from last 7 days   Lab Units 06/25/21  1210   INFLUENZA A PCR  None Detected   INFLUENZA B PCR  None Detected   RSV PCR  None Detected     Results from last 7 days   Lab Units 06/23/21  1609 06/23/21  0832 06/23/21  0831   BLOOD CULTURE   --  No Growth After 4 Days  No Growth After 4 Days     SPUTUM CULTURE  3+ Growth of Candida albicans*  3+ Growth of   --   --    GRAM STAIN RESULT  1+ Epithelial cells per low power field*  No polys seen*  Rare Gram negative rods*  Rare Gram positive rods*  --   --      Results from last 7 days   Lab Units 06/28/21  1138 06/26/21  0330 06/25/21  0459 06/24/21  0532 06/22/21  1602   TOTAL COUNTED  100 100 100 100 100           Past Medical History:   Diagnosis Date    Anxiety     Hyperlipidemia      Present on Admission:   Acute respiratory failure with hypoxia ([facility] [address] )   complicated diverticulitis, colocutaneous fistula, s/p sigmoid colon resection   Nicotine dependence   (Resolved) Hyperlipidemia   Depression   Moderate protein-calorie malnutrition (HCC)      Admitting Diagnosis: Respiratory distress [R06 03]  Age/Sex: 62 y o  female  Admission Orders:  Scheduled Medications:  cefepime, 2,000 mg, Intravenous, Q12H  heparin (porcine), 5,000 Units, Subcutaneous, Q8H [address]  ipratropium-albuterol, 3 mL, Nebulization, Q6H  nicotine, 14 mg, Transdermal, Daily  oxyCODONE, 5 mg, Oral, Q6H ROLAND  pantoprazole, 40 mg, Oral, Early Morning  predniSONE, 40 mg, Oral, Daily  sertraline, 100 mg, Oral, Daily  sodium chloride, 4 mL, Nebulization, Q6H  vancomycin, 20 mg/kg, Intravenous, Q24H  Albuterol neb [email or web address]  IV lasix [email or web address]  LR 250ml bolus @1420  IV [email or web address]    Continuous IV Infusions:none     PRN Meds:  ondansetron, 4 mg, Intravenous, Q8H PRN  oxyCODONE-acetaminophen, 1 tablet, Oral, Q4H PRN    bipap  SCD  Tele      IP CONSULT TO ACUTE CARE SURGERY    Network Utilization Review Department  ATTENTION: Please call with any questions or concerns to [phone] and carefully listen to the prompts so that you are directed to the right person  All voicemails are confidential   Heavenly Mirza all requests for admission clinical reviews, approved or denied determinations and any other requests to dedicated fax number below belonging to the campus where the patient is receiving treatment   List of dedicated fax numbers for the Facilities:  [address] DENIALS (Administrative/Medical Necessity) [phone]   [address] (Maternity/NICU/Pediatrics) 261 [facility],7Th Floor [address] [place] [address] [id number] [address]  y  60[address] [facility] Naga Franco 1481 [address] [phone]

## 2021-06-28 NOTE — UTILIZATION REVIEW
Notification of Discharge   This is a Notification of Discharge from our facility [address]  Please be advised that this patient has been discharge from our facility  Below you will find the admission and discharge date and time including the patients disposition  UTILIZATION REVIEW CONTACT:  Kaitlin Victor  Utilization   Network Utilization Review Department  Phone: [phone] x carefully listen to the prompts  All voicemails are confidential   Email: [email or web address]     PHYSICIAN ADVISORY SERVICES:  FOR QMLP-NY-KEPQ REVIEW - MEDICAL NECESSITY DENIAL  Phone: [phone]  Fax: [phone]  Email: [email or web address]     PRESENTATION DATE: 6/21/2021  7:20 AM  OBERVATION ADMISSION DATE:  INPATIENT ADMISSION DATE: 6/21/21  3:08 PM   DISCHARGE DATE: 6/25/2021  4:00 AM  DISPOSITION: [address]      IMPORTANT INFORMATION:  Send all requests for admission clinical reviews, approved or denied determinations and any other requests to dedicated fax number below belonging to the campus where the patient is receiving treatment   List of dedicated fax numbers:  [address] DENIALS (Administrative/Medical Necessity) [phone]   [address] [place] [address] (Maternity/NICU/Pediatrics) [phone]   Tee Bailey [phone]   [facility] [phone]   Zenon Minors [phone]   [address] [phone]   [facility]  [phone]   [address] [facility] [address]  2401 [facility] 1000 [address] [facility] [phone]

## 2021-06-28 NOTE — UTILIZATION REVIEW
[address]  Radha Hewitt, [address] [place] [address]  [phone]     Tax ID: [id number]     NPI: [id number]            Raimundo Caller TRISTON#[id number] (FDW:[id number]) (:1960 62 y o  F) (Adm: 21 0438) Inpatient    AL [facility] 2-East 2 -E2 -01           Demographics  Comment          Last edited by  on  at      Address: Home Phone: Work Phone: Mobile Phone:       [address] [facility]   [place] [address] [employer] [place]  [phone]      SSN: Insurance: Marital Status: Baptism:        Harlene Ogren /Civil [employer] Account [de-identified]     CVG-F/O Precert Number [address] [place] Phone Authorization Number     1   [facility] PLAN [id number]             Primary Visit Coverage (Effective 2019)        Payer Plan Group Number Group Name     SHANE Randall [id number]                 Primary Visit Coverage Subscriber        Subscriber Name Subscriber ID Subscriber  Subscriber SSN   Regina Faustin [id number]                 Admission Information      Current Information      Attending Provider Admitting Provider Admission Type Admission Status     Valentino Laurel, MD Estelita Bevels, DO Urgent Confirmed Admission              Admission Date/Time Discharge Date/Time Hospital Service Auth/Cert Status     10/31/14  04:38 AM 13[facility] Unit Room/Bed Referring Provider     [address] [place] [address] 2 MED SURG East 2 /E2 -01 Sonia Henley MD              [place] [address]         Diagnosis      Respiratory distress

## 2021-06-29 LAB
ANION GAP SERPL CALCULATED.3IONS-SCNC: 9 MMOL/L (ref 4–13)
ANISOCYTOSIS BLD QL SMEAR: PRESENT
BASOPHILS # BLD MANUAL: 0 THOUSAND/UL (ref 0–0.1)
BASOPHILS NFR MAR MANUAL: 0 % (ref 0–1)
BUN SERPL-MCNC: 21 MG/DL (ref 5–25)
CALCIUM SERPL-MCNC: 8.1 MG/DL (ref 8.3–10.1)
CHLORIDE SERPL-SCNC: 103 MMOL/L (ref 100–108)
CO2 SERPL-SCNC: 26 MMOL/L (ref 21–32)
CREAT SERPL-MCNC: 0.83 MG/DL (ref 0.6–1.3)
EOSINOPHIL # BLD MANUAL: 0 THOUSAND/UL (ref 0–0.4)
EOSINOPHIL NFR BLD MANUAL: 0 % (ref 0–6)
ERYTHROCYTE [DISTWIDTH] IN BLOOD BY AUTOMATED COUNT: 18.3 % (ref 11.6–15.1)
FOLATE SERPL-MCNC: 1.8 NG/ML (ref 3.1–17.5)
GFR SERPL CREATININE-BSD FRML MDRD: 77 ML/MIN/1.73SQ M
GLUCOSE SERPL-MCNC: 113 MG/DL (ref 65–140)
GLUCOSE SERPL-MCNC: 73 MG/DL (ref 65–140)
GLUCOSE SERPL-MCNC: 77 MG/DL (ref 65–140)
GLUCOSE SERPL-MCNC: 81 MG/DL (ref 65–140)
GLUCOSE SERPL-MCNC: 89 MG/DL (ref 65–140)
HCT VFR BLD AUTO: 32 % (ref 34.8–46.1)
HGB BLD-MCNC: 10.4 G/DL (ref 11.5–15.4)
LYMPHOCYTES # BLD AUTO: 0.37 THOUSAND/UL (ref 0.6–4.47)
LYMPHOCYTES # BLD AUTO: 10 % (ref 14–44)
MCH RBC QN AUTO: 33.3 PG (ref 26.8–34.3)
MCHC RBC AUTO-ENTMCNC: 32.5 G/DL (ref 31.4–37.4)
MCV RBC AUTO: 103 FL (ref 82–98)
MONOCYTES # BLD AUTO: 0.04 THOUSAND/UL (ref 0–1.22)
MONOCYTES NFR BLD: 1 % (ref 4–12)
NEUTROPHILS # BLD MANUAL: 3.29 THOUSAND/UL (ref 1.85–7.62)
NEUTS BAND NFR BLD MANUAL: 1 % (ref 0–8)
NEUTS SEG NFR BLD AUTO: 88 % (ref 43–75)
NRBC BLD AUTO-RTO: 0 /100 WBCS
PLATELET # BLD AUTO: 105 THOUSANDS/UL (ref 149–390)
PLATELET BLD QL SMEAR: ABNORMAL
PMV BLD AUTO: 10.4 FL (ref 8.9–12.7)
POTASSIUM SERPL-SCNC: 3.8 MMOL/L (ref 3.5–5.3)
RBC # BLD AUTO: 3.12 MILLION/UL (ref 3.81–5.12)
SODIUM SERPL-SCNC: 138 MMOL/L (ref 136–145)
TOTAL CELLS COUNTED SPEC: 100
VIT B12 SERPL-MCNC: 404 PG/ML (ref 100–900)
WBC # BLD AUTO: 3.7 THOUSAND/UL (ref 4.31–10.16)

## 2021-06-29 PROCEDURE — 82607 VITAMIN B-12: CPT | Performed by: INTERNAL MEDICINE

## 2021-06-29 PROCEDURE — 94760 N-INVAS EAR/PLS OXIMETRY 1: CPT

## 2021-06-29 PROCEDURE — 85027 COMPLETE CBC AUTOMATED: CPT | Performed by: INTERNAL MEDICINE

## 2021-06-29 PROCEDURE — 82948 REAGENT STRIP/BLOOD GLUCOSE: CPT

## 2021-06-29 PROCEDURE — 94640 AIRWAY INHALATION TREATMENT: CPT

## 2021-06-29 PROCEDURE — 97110 THERAPEUTIC EXERCISES: CPT

## 2021-06-29 PROCEDURE — 97116 GAIT TRAINING THERAPY: CPT

## 2021-06-29 PROCEDURE — NC001 PR NO CHARGE: Performed by: SURGERY

## 2021-06-29 PROCEDURE — 80048 BASIC METABOLIC PNL TOTAL CA: CPT | Performed by: INTERNAL MEDICINE

## 2021-06-29 PROCEDURE — 85007 BL SMEAR W/DIFF WBC COUNT: CPT | Performed by: INTERNAL MEDICINE

## 2021-06-29 PROCEDURE — 99232 SBSQ HOSP IP/OBS MODERATE 35: CPT | Performed by: INTERNAL MEDICINE

## 2021-06-29 PROCEDURE — 82746 ASSAY OF FOLIC ACID SERUM: CPT | Performed by: INTERNAL MEDICINE

## 2021-06-29 PROCEDURE — 97530 THERAPEUTIC ACTIVITIES: CPT

## 2021-06-29 RX ORDER — FUROSEMIDE 10 MG/ML
20 INJECTION INTRAMUSCULAR; INTRAVENOUS ONCE
Status: COMPLETED | OUTPATIENT
Start: 2021-06-29 | End: 2021-06-29

## 2021-06-29 RX ADMIN — OXYCODONE HYDROCHLORIDE 5 MG: 5 TABLET ORAL at 05:48

## 2021-06-29 RX ADMIN — HEPARIN SODIUM 5000 UNITS: 5000 INJECTION INTRAVENOUS; SUBCUTANEOUS at 22:55

## 2021-06-29 RX ADMIN — HEPARIN SODIUM 5000 UNITS: 5000 INJECTION INTRAVENOUS; SUBCUTANEOUS at 05:48

## 2021-06-29 RX ADMIN — CEFEPIME HYDROCHLORIDE 2000 MG: 2 INJECTION, POWDER, FOR SOLUTION INTRAVENOUS at 10:35

## 2021-06-29 RX ADMIN — BUDESONIDE 0.5 MG: 0.5 INHALANT ORAL at 07:58

## 2021-06-29 RX ADMIN — IPRATROPIUM BROMIDE AND ALBUTEROL SULFATE 3 ML: 2.5; .5 SOLUTION RESPIRATORY (INHALATION) at 13:14

## 2021-06-29 RX ADMIN — BUDESONIDE 0.5 MG: 0.5 INHALANT ORAL at 19:41

## 2021-06-29 RX ADMIN — IPRATROPIUM BROMIDE AND ALBUTEROL SULFATE 3 ML: 2.5; .5 SOLUTION RESPIRATORY (INHALATION) at 19:41

## 2021-06-29 RX ADMIN — IPRATROPIUM BROMIDE AND ALBUTEROL SULFATE 3 ML: 2.5; .5 SOLUTION RESPIRATORY (INHALATION) at 02:00

## 2021-06-29 RX ADMIN — CEFEPIME HYDROCHLORIDE 2000 MG: 2 INJECTION, POWDER, FOR SOLUTION INTRAVENOUS at 22:55

## 2021-06-29 RX ADMIN — FUROSEMIDE 20 MG: 10 INJECTION, SOLUTION INTRAMUSCULAR; INTRAVENOUS at 12:54

## 2021-06-29 RX ADMIN — IPRATROPIUM BROMIDE AND ALBUTEROL SULFATE 3 ML: 2.5; .5 SOLUTION RESPIRATORY (INHALATION) at 07:58

## 2021-06-29 RX ADMIN — Medication 14 MG: at 08:56

## 2021-06-29 RX ADMIN — OXYCODONE HYDROCHLORIDE 5 MG: 5 TABLET ORAL at 17:53

## 2021-06-29 RX ADMIN — PANTOPRAZOLE SODIUM 40 MG: 40 TABLET, DELAYED RELEASE ORAL at 05:48

## 2021-06-29 RX ADMIN — SERTRALINE HYDROCHLORIDE 100 MG: 100 TABLET ORAL at 08:55

## 2021-06-29 RX ADMIN — HEPARIN SODIUM 5000 UNITS: 5000 INJECTION INTRAVENOUS; SUBCUTANEOUS at 13:05

## 2021-06-29 NOTE — PHYSICAL THERAPY NOTE
Physical Therapy Progress Note     06/29/21 1353   PT Last Visit   PT Visit Date 06/29/21   Note Type   Note Type Treatment   Pain Assessment   Pain Assessment Tool Pain Assessment not indicated - pt denies pain   Pain Score No Pain   Restrictions/Precautions   Weight Bearing Precautions Per Order No   Other Precautions Fall Risk;O2;Multiple lines;Telemetry  (1L midflow O2)   General   Chart Reviewed Yes   Response to Previous Treatment Patient reporting fatigue but able to participate  Family/Caregiver Present No   Subjective   Subjective Willing to participate in therapy this PM    Bed Mobility   Supine to Sit 5  Supervision   Additional items Assist x 1;HOB elevated; Bedrails;Leg ; Increased time required;LE management;Verbal cues   Sit to Supine 5  Supervision   Additional items Assist x 1;Bedrails; Increased time required   Transfers   Sit to Stand 4  Minimal assistance   Additional items Increased time required;Assist x 1; Armrests; Bedrails;Verbal cues   Stand to Sit 4  Minimal assistance   Additional items Assist x 1;Bedrails; Increased time required;Verbal cues   Toilet transfer 4  Minimal assistance   Additional items Assist x 1; Armrests;Standard toilet;Verbal cues   Ambulation/Elevation   Gait pattern Inconsistent joanne; Short stride; Excessively slow; Redundant gait at times; Shuffling   Gait Assistance 4  Minimal assist   Additional items Assist x 1;Verbal cues; Tactile cues   Assistive Device None; Other (Comment)  (noted attempts at furniture walking upon standing)   Distance 15' x 2 with seated toileting break in between   Balance   Static Sitting Good   Dynamic Sitting Fair +   Static Standing Fair   Dynamic Standing Fair -   Ambulatory Poor +   Endurance Deficit   Endurance Deficit Yes   Endurance Deficit Description fatigue/weakness   Activity Tolerance   Activity Tolerance Patient limited by fatigue   Nurse Made Aware Yes   Exercises   THR Supine;Sitting;10 reps;AAROM; Bilateral   Assessment Prognosis Good   Problem List Decreased strength;Decreased range of motion;Decreased endurance; Impaired balance;Decreased mobility; Decreased safety awareness;Decreased skin integrity   Assessment Pt  supine in bed upon my arrival  Pt  reporting fatigue, however agreeable to therapeutic intervention  Performance of HEP with cues provided for proper completion  Progressed with transfers requiring cues of therapist for hand placement/technique  Noted pt  remains on midflow, thus limited amb  distance due to line restriction  Pt  progressed with amb  trials x 2 with use of no AD and Luiza of therapist  Noted attempts at furniture walking at beginning of amb  , however with improvement with continued amb  Pt  requested to use br, able to complete self pericare  Pt  continued with a second amb  trial with return to supine in bed with alarm active at end of treatment session  Will continue to assess for most appropriate AD RW vs  none  Pt  will continue to benefit from rehab upon d/c, however if home would recommend increased support and HHPT provided when medically stable  Barriers to Discharge Inaccessible home environment;Decreased caregiver support   Barriers to Discharge Comments ROSS   Goals   Patient Goals To get better  STG Expiration Date 07/09/21   PT Treatment Day 1   Plan   Treatment/Interventions Functional transfer training;LE strengthening/ROM; Therapeutic exercise; Endurance training;Bed mobility;Gait training;Spoke to nursing;Spoke to case management   Progress Progressing toward goals   PT Frequency Other (Comment)  (3-5x/wk)   Recommendation   PT Discharge Recommendation Post acute rehabilitation services   Equipment Recommended [address] Recommended Wheeled walker   Change/add to [employer]? No   PT - OK to Discharge Yes  (if d/c to rehab when medically stable  )   AM-PAC Basic Mobility Inpatient   Turning in Bed Without Bedrails 3   Lying on Back to Sitting on Edge of Flat Bed 3 Moving Bed to Chair 3   Standing Up From Chair 3   Walk in Room 3   Climb 3-5 Stairs 1   Basic Mobility Inpatient Raw Score 16   Basic Mobility Standardized Score 38 32     An AM-PAC Basic Mobility standardized score less than 42 9 suggests the patient may benefit from discharge to post-acute rehab services      Cristy Villasenor, PTA

## 2021-06-29 NOTE — ASSESSMENT & PLAN NOTE
Suspected due to postoperative/perioperative aspiration, treated with BiPAP, supplemental oxygen, IV antibiotics, Pulmonary consult, improved with IV Lasix on 06/28 and presently on 2 L of oxygen  Wean oxygen to RA  Continue Pulmicort nebs b i d , bronchial hygiene  repeat IV Lasix 20 mg today

## 2021-06-29 NOTE — PROGRESS NOTES
Progress Note - General Surgery  : [facility] Surgery Resident on [facility] VINCE Brown 62 y o  female MRN: [id number]  Unit/Bed#: E2 -01 Encounter: [id number]    Assessment:  62 y o  female   POD8 from sigmoidectomy with primary anastamosis, colocutaneous fistula takedown  Plan:  Surg soft diet  Daily wound care per nursing for colocutaneous fistula site   - if this is not overly saturated can be open to air    Patient cleared for discharge surgically, can follow up in office in 2 weeks time   - Will defer disposition to medical team regarding improved respiratory status    Subjective/Objective     Subjective: Tolerating diet  Having bowel function      Objective:     Physical Exam: General: AAOx3  Head: normocephalic, atraumatic  Neck: supple, trachea midline  Respiratory: BS b/l  Abdomen: Soft, NT, no rebound/guarding  Incision c/d/i  Colocutaneous fistula site with mesalt in place  Heart: RRR, S1s2  Ext: Warm no cyanosis         Vitals: Temp:  [97 1 °F (36 2 °C)-97 9 °F (36 6 °C)] 97 1 °F (36 2 °C)  HR:  [80-88] 80  Resp:  [18] 18  BP: (109-121)/(66-78) 121/78  Body mass index is 21 44 kg/m²  I/O       06/25 0701 - 06/26 0700 06/26 0701 - 06/27 0700 06/27 0701 - 06/28 0700    P  O   180 180    IV Piggyback 550 100 50    Total Intake(mL/kg) 550 (10) 280 (5 1) 230 (4 2)    Urine (mL/kg/hr) 971 (0 7) 450 (0 3)     Total Output 971 450     Net -421 -170 +230           Unmeasured Urine Occurrence   1 x    Unmeasured Stool Occurrence   2 x            Lab, Imaging and other studies: I have personally reviewed pertinent reports    , CBC with diff:   Lab Results   Component Value Date    WBC 3 70 (L) 06/29/2021    HGB 10 4 (L) 06/29/2021    HCT 32 0 (L) 06/29/2021     (H) 06/29/2021     (L) 06/29/2021    MCH 33 3 06/29/2021    MCHC 32 5 06/29/2021    RDW 18 3 (H) 06/29/2021    MPV 10 4 06/29/2021    NRBC 0 06/29/2021   , BMP/CMP:   Lab Results   Component Value Date    SODIUM 138 06/29/2021    K 3 8 06/29/2021     06/29/2021    CO2 26 06/29/2021    BUN 21 06/29/2021    CREATININE 0 83 06/29/2021    CALCIUM 8 1 (L) 06/29/2021    EGFR 77 06/29/2021     VTE Pharmacologic Prophylaxis: Heparin  VTE Mechanical Prophylaxis: sequential compression device      Tai Ratliff MD  6/29/2021 8:49 AM

## 2021-06-29 NOTE — ASSESSMENT & PLAN NOTE
Present WBC of 3 01 > 3 7, thrombocytopenia 103 > 105, hemoglobin of 10 2>10 4, possible causes include infection, antibiotics  Send peripheral smear: Hypersegmented neutrophils   Rare stomatocytes;    Possible causes include B12, folate deficiency, MDS, drugs as patient has been on IV abx  4T-score is 1 point, low probability of HIT  Monitor daily  Consult hematology if not improving tomorrow

## 2021-06-29 NOTE — PLAN OF CARE
Problem: PHYSICAL THERAPY ADULT  Goal: Performs mobility at highest level of function for planned discharge setting  See evaluation for individualized goals  Description:   Outcome: Progressing  Note: Prognosis: Good  Problem List: Decreased strength, Decreased range of motion, Decreased endurance, Impaired balance, Decreased mobility, Decreased safety awareness, Decreased skin integrity  Assessment: Pt  supine in bed upon my arrival  Pt  reporting fatigue, however agreeable to therapeutic intervention  Performance of HEP with cues provided for proper completion  Progressed with transfers requiring cues of therapist for hand placement/technique  Noted pt  remains on midflow, thus limited amb  distance due to line restriction  Pt  progressed with amb  trials x 2 with use of no AD and Luiza of therapist  Noted attempts at furniture walking at beginning of amb  , however with improvement with continued amb  Pt  requested to use br, able to complete self pericare  Pt  continued with a second amb  trial with return to supine in bed with alarm active at end of treatment session  Will continue to assess for most appropriate AD RW vs  none  Pt  will continue to benefit from rehab upon d/c, however if home would recommend increased support and HHPT provided when medically stable  Barriers to Discharge: Inaccessible home environment, Decreased caregiver support  Barriers to Discharge Comments: ROSS     PT Discharge Recommendation: Post acute rehabilitation services     PT - OK to Discharge: Yes (if d/c to rehab when medically stable )    See flowsheet documentation for full assessment

## 2021-06-29 NOTE — PROGRESS NOTES
Progress Note - Infectious Disease   Marlene Lindsay 62 y o  female MRN: [id number]  Unit/Bed#: E2 -01 Encounter: [id number]      Impression/Plan:    1- Acute hypoxic respiratory failure:  Developed on postop day 2 after open sigmoid colon resection and left salpingectomy;   This may be postoperative/perioperative aspiration pneumonia   Patient is clinically much improved on antibiotic  O2 support much decreased; and on nasal cannula 1 liter/minute  Sputum culture with mixed respiratory penelope; Flu, RSV, COVID-19 PCR negative  suspect respiratory failure multifactorial, mainly due to mucus plugging and possible associated pneumonia   Possible component of pulmonary edema in setting of elevated troponin     - stop antibiotics  Patient received already 7 days course, and she is currently afebrile, hemodynamically stable, with significant improvement in respiratory status       2- Suspected pneumonia:  Probable perioperative/postoperative aspirate  Patient is clinically much improved   Respiratory culture with mixed penelope, not helpful   - antibiotic as above  - monitor respiratory symptoms     3- complicated diverticulitis:  With recurrent abscess and colocutaneous fistula  Now postop day 8 for open resection of sigmoid colon, left salpingectomy  Operative report reviewed, extensive phlegmonous changes and adhesions noted  Surgical wound with no signs of infection at this time  - frequent abdominal exams  - pain control as per primary service team and General surgery  - close general surgery follow-up     4- suspected COPD:  Patient with chronic smoking  Pulmonary evaluation reviewed, currently off steroids  [facility] pulmonary team follow-up     5- nicotine dependence:  Stressed need for complete smoking cessation after hospital discharge      6- troponin elevation:  Possible non MI troponin elevation setting of acute hypoxic respiratory failure    - continue close monitoring of hemodynamics status  - cardiology follow-up     7- leukopenia and thrombocytopenia:  Possibly related to antibiotic use  -  follow-up CBC with differential now that antibiotics are stopped  -  if no improvement in leukopenia and thrombocytopenia, consider consulting Hematology Service         Plan mentioned above discussed with patient and with primary service team    Antibiotics:  Cefepime day 7    Subjective:  Patient has no fever, chills, sweats; no nausea, vomiting, diarrhea; no cough, shortness of breath; no pain  No new symptoms  Patient is very frustrated and is eager to go home    Objective:  Vitals:  Temp:  [97 1 °F (36 2 °C)-97 9 °F (36 6 °C)] 97 6 °F (36 4 °C)  HR:  [76-85] 76  Resp:  [18] 18  BP: (109-121)/(67-78) 115/72  SpO2:  [95 %-99 %] 97 %  Temp (24hrs), Av 5 °F (36 4 °C), Min:97 1 °F (36 2 °C), Max:97 9 °F (36 6 °C)  Current: Temperature: 97 6 °F (36 4 °C)    Physical Exam:   General Appearance:  Alert, interactive, nontoxic, no acute distress  Throat: Oropharynx moist without lesions  Lungs:   Clear to auscultation bilaterally; no wheezes, rhonchi or rales; respirations unlabored   Heart:  RRR; no murmur, rub or gallop   Abdomen:   Soft, non-tender, non-distended, positive bowel sounds  Extremities: No clubbing, cyanosis or edema   Skin: No new rashes or lesions  No draining wounds noted         Labs, Imaging, & Other studies:   All pertinent labs and imaging studies were personally reviewed  Results from last 7 days   Lab Units 21  0435 21  0520 21  0459   WBC Thousand/uL 3 70* 3 01* 3 89*   HEMOGLOBIN g/dL 10 4* 10 2* 10 0*   PLATELETS Thousands/uL 105* 103* 142*     Results from last 7 days   Lab Units 21  0435 21  0520 21  0459 21  0330 21  0459   SODIUM mmol/L 138 138 139 138 139   POTASSIUM mmol/L 3 8 3 5 3 4* 4 0 4 1   CHLORIDE mmol/L 103 105 106 105 106   CO2 mmol/L 26 23 20* 22 22   BUN mg/dL 21 21 32* 28* 21   CREATININE mg/dL 0 83 0 72 0  86 1 06 0 98   EGFR ml/min/1 73sq m 77 91 74 57 63   CALCIUM mg/dL 8 1* 7 8* 8 1* 8 1* 8 2*   AST U/L  --   --  23 32 57*   ALT U/L  --   --  20 20 24   ALK PHOS U/L  --   --  48 49 54     Results from last 7 days   Lab Units 06/24/21  1304 06/23/21  1609 06/23/21  0832 06/23/21  0831   BLOOD CULTURE   --   --  No Growth After 5 Days  No Growth After 5 Days     SPUTUM CULTURE   --  3+ Growth of Candida albicans*  3+ Growth of   --   --    GRAM STAIN RESULT   --  1+ Epithelial cells per low power field*  No polys seen*  Rare Gram negative rods*  Rare Gram positive rods*  --   --    MRSA CULTURE ONLY  No Methicillin Resistant Staphlyococcus aureus (MRSA) isolated  --   --   --      Results from last 7 days   Lab Units 06/26/21  0806 06/25/21  0500   PROCALCITONIN ng/ml 2 06* 2 47*

## 2021-06-29 NOTE — PROGRESS NOTES
[facility]  Progress Note - [facility] 1960, 62 y o  female MRN: [id number]  Unit/Bed#: E2 -01 Encounter: [id number]  Primary Care Provider: Mari Ha DO   Date and time admitted to hospital: 6/25/2021  2:90 AM    complicated diverticulitis, colocutaneous fistula, s/p sigmoid colon resection  Assessment & Plan  Diverticulitis complicated by abscess, nonhealing myocutaneous fistula despite medication intervention and drain  POD 7 sigmoid colon resection, left salpingectomy with splenic flexure mobilization  Pain control as ordered  Continue antibiotics #7  Infectious disease following      * Acute respiratory failure with hypoxia (HCC)  Assessment & Plan  Suspected due to postoperative/perioperative aspiration, treated with BiPAP, supplemental oxygen, IV antibiotics, Pulmonary consult, improved with IV Lasix on 06/28 and presently on 2 L of oxygen  Wean oxygen to RA  Continue Pulmicort nebs b i d , bronchial hygiene  repeat IV Lasix 20 mg today        Pancytopenia (HCC)  Assessment & Plan  Present WBC of 3 01 > 3 7, thrombocytopenia 103 > 105, hemoglobin of 10 2>10 4, possible causes include infection, antibiotics  Send peripheral smear: Hypersegmented neutrophils   Rare stomatocytes; Possible causes include nutritional from B12, folate deficiency, MDS, drugs as patient has been on IV abx  4T-score is 1 point, low probability of HIT  Monitor daily  Consult hematology if not improving tomorrow       Nicotine dependence  Assessment & Plan  Continue nicotine patch    Moderate protein-calorie malnutrition (HCC)  Assessment & Plan  Malnutrition Findings:   Adult Malnutrition type: Chronic illness  Adult Degree of Malnutrition: Malnutrition of moderate degree    BMI Findings: Body mass index is 21 44 kg/m²       As per GI surgery, surgical lite diet, advance as GI suggests  Nutrition on consult, appreciate recs    Depression  Assessment & Plan  Continue sertraline 100 mg daily          VTE Pharmacologic Prophylaxis: VTE Score: 7 (Simultaneous filing  User may not have seen previous data ) Moderate Risk (Score 3-4) - Pharmacological DVT Prophylaxis Ordered: heparin  Patient Centered Rounds: I performed bedside rounds with nursing staff today  Discussions with Specialists or Other Care Team Provider:     Education and Discussions with Family / Patient: Patient declined call to   Time Spent for Care: 30 minutes  More than 50% of total time spent on counseling and coordination of care as described above  Current Length of Stay: 4 day(s)  Current Patient Status: Inpatient   Certification Statement: The patient will continue to require additional inpatient hospital stay due to Completion of IV antibiotics, acute hypoxemic respiratory failure 2 L of oxygen  Discharge Plan: Anticipate discharge tomorrow to home with home services  Code Status: Level 1 - Full Code    Subjective:   Seen  Has no complaints to offer  Tolerating diet  Oxygen has improved to 2 L    Objective:     Vitals:   Temp (24hrs), Av 5 °F (36 4 °C), Min:97 1 °F (36 2 °C), Max:97 9 °F (36 6 °C)    Temp:  [97 1 °F (36 2 °C)-97 9 °F (36 6 °C)] 97 1 °F (36 2 °C)  HR:  [80-85] 80  Resp:  [18] 18  BP: (109-121)/(67-78) 121/78  SpO2:  [93 %-99 %] 99 %  Body mass index is 21 44 kg/m²  Input and Output Summary (last 24 hours): Intake/Output Summary (Last 24 hours) at 2021 1228  Last data filed at 2021 0530  Gross per 24 hour   Intake 210 ml   Output 275 ml   Net -65 ml       Physical Exam:   Physical Exam  Vitals and nursing note reviewed  Constitutional:       General: She is not in acute distress  Appearance: She is well-developed  Comments: Cachectic looking   HENT:      Head: Normocephalic and atraumatic  Eyes:      Conjunctiva/sclera: Conjunctivae normal    Cardiovascular:      Rate and Rhythm: Normal rate and regular rhythm        Heart sounds: No murmur heard      Pulmonary:      Effort: Pulmonary effort is normal  No respiratory distress  Comments: Reduced in the bases  Abdominal:      General: There is no distension  Palpations: Abdomen is soft  Tenderness: There is no abdominal tenderness  There is no guarding  Comments: Midline sutures noted, well apposed   Musculoskeletal:      Cervical back: Neck supple  Right lower leg: No edema  Left lower leg: No edema  Skin:     General: Skin is warm and dry  Neurological:      General: No focal deficit present  Mental Status: She is alert and oriented to person, place, and time  Mental status is at baseline  Cranial Nerves: No cranial nerve deficit  Motor: No weakness            Additional Data:     Labs:  Results from last 7 days   Lab Units 06/29/21  0435   WBC Thousand/uL 3 70*   HEMOGLOBIN g/dL 10 4*   HEMATOCRIT % 32 0*   PLATELETS Thousands/uL 105*   BANDS PCT % 1   LYMPHO PCT % 10*   MONO PCT % 1*   EOS PCT % 0     Results from last 7 days   Lab Units 06/29/21  0435 06/27/21  0459   SODIUM mmol/L 138 139   POTASSIUM mmol/L 3 8 3 4*   CHLORIDE mmol/L 103 106   CO2 mmol/L 26 20*   BUN mg/dL 21 32*   CREATININE mg/dL 0 83 0 86   ANION GAP mmol/L 9 13   CALCIUM mg/dL 8 1* 8 1*   ALBUMIN g/dL  --  1 6*   TOTAL BILIRUBIN mg/dL  --  0 36   ALK PHOS U/L  --  48   ALT U/L  --  20   AST U/L  --  23   GLUCOSE RANDOM mg/dL 73 97     Results from last 7 days   Lab Units 06/22/21  1602   INR  1 75*     Results from last 7 days   Lab Units 06/29/21  1106 06/29/21  0634 06/28/21  2123 06/28/21  1547 06/28/21  1142 06/28/21  0953 06/28/21  0638 06/27/21  2045 06/27/21  1150 06/27/21  0759 06/26/21  2159 06/26/21  1551   POC GLUCOSE mg/dl 81 113 81 79 97 80 64* 112 118 85 133 133         Results from last 7 days   Lab Units 06/26/21  0806 06/25/21  0500 06/23/21  1140   LACTIC ACID mmol/L  --   --  1 3   PROCALCITONIN ng/ml 2 06* 2 47*  --        Lines/Drains:  Invasive Devices Peripheral Intravenous Line            Peripheral IV 06/29/21 Right;Ventral (anterior) Forearm <1 day                      Imaging: No pertinent imaging reviewed  Recent Cultures (last 7 days):   Results from last 7 days   Lab Units 06/23/21  1609 06/23/21  0832 06/23/21  0831   BLOOD CULTURE   --  No Growth After 5 Days  No Growth After 5 Days  SPUTUM CULTURE  3+ Growth of Candida albicans*  3+ Growth of   --   --    GRAM STAIN RESULT  1+ Epithelial cells per low power field*  No polys seen*  Rare Gram negative rods*  Rare Gram positive rods*  --   --        Last 24 Hours Medication List:   Current Facility-Administered Medications   Medication Dose Route Frequency Provider Last Rate    budesonide  0 5 mg Nebulization Q12H Cindy Spann MD      cefepime  2,000 mg Intravenous Q12H Cindy Spann MD 2,000 mg (06/29/21 1035)    heparin (porcine)  5,000 Units Subcutaneous Q8H [facility] Cindy Spann MD      ipratropium-albuterol  3 mL Nebulization Q6H Cindy Spann MD      nicotine  14 mg Transdermal Daily Cindy Spann MD      ondansetron  4 mg Intravenous Q8H PRN Cindy Spann MD      oxyCODONE  5 mg Oral Q6H [facility] Cindy Spann MD      oxyCODONE-acetaminophen  1 tablet Oral Q4H PRN Cindy Spann MD      pantoprazole  40 mg Oral Early Morning Cindy Spann MD      sertraline  100 mg Oral Daily Cindy Spann MD          Today, Patient Was Seen By: Beulah Meier MD    **Please Note: This note may have been constructed using a voice recognition system  **

## 2021-06-29 NOTE — ASSESSMENT & PLAN NOTE
Diverticulitis complicated by abscess, nonhealing myocutaneous fistula despite medication intervention and drain  POD 7 sigmoid colon resection, left salpingectomy with splenic flexure mobilization     Pain control as ordered  Continue antibiotics #7  Infectious disease following

## 2021-06-30 VITALS
OXYGEN SATURATION: 97 % | DIASTOLIC BLOOD PRESSURE: 91 MMHG | SYSTOLIC BLOOD PRESSURE: 135 MMHG | WEIGHT: 121.03 LBS | HEIGHT: 63 IN | BODY MASS INDEX: 21.45 KG/M2 | TEMPERATURE: 97.9 F | RESPIRATION RATE: 20 BRPM | HEART RATE: 75 BPM

## 2021-06-30 LAB
ANION GAP SERPL CALCULATED.3IONS-SCNC: 11 MMOL/L (ref 4–13)
BASOPHILS # BLD MANUAL: 0 THOUSAND/UL (ref 0–0.1)
BASOPHILS NFR MAR MANUAL: 0 % (ref 0–1)
BUN SERPL-MCNC: 18 MG/DL (ref 5–25)
CALCIUM SERPL-MCNC: 7.8 MG/DL (ref 8.3–10.1)
CHLORIDE SERPL-SCNC: 103 MMOL/L (ref 100–108)
CO2 SERPL-SCNC: 24 MMOL/L (ref 21–32)
CREAT SERPL-MCNC: 0.67 MG/DL (ref 0.6–1.3)
EOSINOPHIL # BLD MANUAL: 0 THOUSAND/UL (ref 0–0.4)
EOSINOPHIL NFR BLD MANUAL: 0 % (ref 0–6)
ERYTHROCYTE [DISTWIDTH] IN BLOOD BY AUTOMATED COUNT: 17.7 % (ref 11.6–15.1)
GFR SERPL CREATININE-BSD FRML MDRD: 96 ML/MIN/1.73SQ M
GLUCOSE SERPL-MCNC: 78 MG/DL (ref 65–140)
GLUCOSE SERPL-MCNC: 79 MG/DL (ref 65–140)
GLUCOSE SERPL-MCNC: 88 MG/DL (ref 65–140)
HCT VFR BLD AUTO: 28.3 % (ref 34.8–46.1)
HGB BLD-MCNC: 9.6 G/DL (ref 11.5–15.4)
LYMPHOCYTES # BLD AUTO: 0.31 THOUSAND/UL (ref 0.6–4.47)
LYMPHOCYTES # BLD AUTO: 8 % (ref 14–44)
MCH RBC QN AUTO: 34.2 PG (ref 26.8–34.3)
MCHC RBC AUTO-ENTMCNC: 33.9 G/DL (ref 31.4–37.4)
MCV RBC AUTO: 101 FL (ref 82–98)
MONOCYTES # BLD AUTO: 0.04 THOUSAND/UL (ref 0–1.22)
MONOCYTES NFR BLD: 1 % (ref 4–12)
NEUTROPHILS # BLD MANUAL: 3.55 THOUSAND/UL (ref 1.85–7.62)
NEUTS SEG NFR BLD AUTO: 91 % (ref 43–75)
NRBC BLD AUTO-RTO: 0 /100 WBCS
PLATELET # BLD AUTO: 70 THOUSANDS/UL (ref 149–390)
PLATELET BLD QL SMEAR: ABNORMAL
PMV BLD AUTO: 10.8 FL (ref 8.9–12.7)
POTASSIUM SERPL-SCNC: 2.8 MMOL/L (ref 3.5–5.3)
RBC # BLD AUTO: 2.81 MILLION/UL (ref 3.81–5.12)
RBC MORPH BLD: NORMAL
SODIUM SERPL-SCNC: 138 MMOL/L (ref 136–145)
TOTAL CELLS COUNTED SPEC: 100
WBC # BLD AUTO: 3.9 THOUSAND/UL (ref 4.31–10.16)

## 2021-06-30 PROCEDURE — 94760 N-INVAS EAR/PLS OXIMETRY 1: CPT

## 2021-06-30 PROCEDURE — 85007 BL SMEAR W/DIFF WBC COUNT: CPT | Performed by: INTERNAL MEDICINE

## 2021-06-30 PROCEDURE — 80048 BASIC METABOLIC PNL TOTAL CA: CPT | Performed by: INTERNAL MEDICINE

## 2021-06-30 PROCEDURE — 99231 SBSQ HOSP IP/OBS SF/LOW 25: CPT | Performed by: INTERNAL MEDICINE

## 2021-06-30 PROCEDURE — 99239 HOSP IP/OBS DSCHRG MGMT >30: CPT | Performed by: INTERNAL MEDICINE

## 2021-06-30 PROCEDURE — 94640 AIRWAY INHALATION TREATMENT: CPT

## 2021-06-30 PROCEDURE — 85027 COMPLETE CBC AUTOMATED: CPT | Performed by: INTERNAL MEDICINE

## 2021-06-30 PROCEDURE — 99232 SBSQ HOSP IP/OBS MODERATE 35: CPT | Performed by: INTERNAL MEDICINE

## 2021-06-30 PROCEDURE — 82948 REAGENT STRIP/BLOOD GLUCOSE: CPT

## 2021-06-30 RX ORDER — FOLIC ACID 1 MG/1
1 TABLET ORAL DAILY
Qty: 30 TABLET | Refills: 0 | Status: SHIPPED | OUTPATIENT
Start: 2021-06-30

## 2021-06-30 RX ORDER — FOLIC ACID 1 MG/1
1 TABLET ORAL DAILY
Status: DISCONTINUED | OUTPATIENT
Start: 2021-06-30 | End: 2021-06-30 | Stop reason: HOSPADM

## 2021-06-30 RX ADMIN — BUDESONIDE 0.5 MG: 0.5 INHALANT ORAL at 08:13

## 2021-06-30 RX ADMIN — FOLIC ACID 1 MG: 1 TABLET ORAL at 12:35

## 2021-06-30 RX ADMIN — IPRATROPIUM BROMIDE AND ALBUTEROL SULFATE 3 ML: 2.5; .5 SOLUTION RESPIRATORY (INHALATION) at 08:13

## 2021-06-30 RX ADMIN — OXYCODONE HYDROCHLORIDE 5 MG: 5 TABLET ORAL at 08:35

## 2021-06-30 RX ADMIN — IPRATROPIUM BROMIDE AND ALBUTEROL SULFATE 3 ML: 2.5; .5 SOLUTION RESPIRATORY (INHALATION) at 01:28

## 2021-06-30 RX ADMIN — Medication 14 MG: at 08:36

## 2021-06-30 RX ADMIN — CYANOCOBALAMIN TAB 500 MCG 1000 MCG: 500 TAB at 12:33

## 2021-06-30 RX ADMIN — HEPARIN SODIUM 5000 UNITS: 5000 INJECTION INTRAVENOUS; SUBCUTANEOUS at 06:31

## 2021-06-30 RX ADMIN — SERTRALINE HYDROCHLORIDE 100 MG: 100 TABLET ORAL at 08:36

## 2021-06-30 RX ADMIN — IPRATROPIUM BROMIDE AND ALBUTEROL SULFATE 3 ML: 2.5; .5 SOLUTION RESPIRATORY (INHALATION) at 13:25

## 2021-06-30 RX ADMIN — PANTOPRAZOLE SODIUM 40 MG: 40 TABLET, DELAYED RELEASE ORAL at 06:31

## 2021-06-30 NOTE — PLAN OF CARE
Problem: MOBILITY - ADULT  Goal: Maintain or return to baseline ADL function  Description: INTERVENTIONS:  -  Assess patient's ability to carry out ADLs; assess patient's baseline for ADL function and identify physical deficits which impact ability to perform ADLs (bathing, care of mouth/teeth, toileting, grooming, dressing, etc )  - Assess/evaluate cause of self-care deficits   - Assess range of motion  - Assess patient's mobility; develop plan if impaired  - Assess patient's need for assistive devices and provide as appropriate  - Encourage maximum independence but intervene and supervise when necessary  - Involve family in performance of ADLs  - Assess for home care needs following discharge   - Consider OT consult to assist with ADL evaluation and planning for discharge  - Provide patient education as appropriate  Outcome: Progressing  Goal: Maintains/Returns to pre admission functional level  Description: INTERVENTIONS:  - Perform BMAT or MOVE assessment daily    - Set and communicate daily mobility goal to care team and patient/family/caregiver  - Collaborate with rehabilitation services on mobility goals if consulted  - Perform Range of Motion 4 times a day    - Dangle patient 3 times a day  - Stand patient 3 times a day  - Ambulate patient 3 times a day  - Out of bed to chair 3 times a day   - Out of bed for meals 3 times a day  - Out of bed for toileting  - Record patient progress and toleration of activity level   Outcome: Progressing     Problem: Potential for Falls  Goal: Patient will remain free of falls  Description: INTERVENTIONS:  - Educate patient/family on patient safety including physical limitations  - Instruct patient to call for assistance with activity   - Consult OT/PT to assist with strengthening/mobility   - Keep Call bell within reach  - Keep bed low and locked with side rails adjusted as appropriate  - Keep care items and personal belongings within reach  - Initiate and maintain comfort rounds  - Make Fall Risk Sign visible to staff  - Offer Toileting every hour, in advance of need  - Initiate/Maintain bed alarm  - Apply yellow socks and bracelet for high fall risk patients  - Consider moving patient to room near nurses station  Outcome: Progressing     Problem: Prexisting or High Potential for Compromised Skin Integrity  Goal: Skin integrity is maintained or improved  Description: INTERVENTIONS:  - Identify patients at risk for skin breakdown  - Assess and monitor skin integrity  - Assess and monitor nutrition and hydration status  - Monitor labs   - Assess for incontinence   - Turn and reposition patient  - Assist with mobility/ambulation  - Relieve pressure over bony prominences  - Avoid friction and shearing  - Provide appropriate hygiene as needed including keeping skin clean and dry  - Evaluate need for skin moisturizer/barrier cream  - Collaborate with interdisciplinary team   - Patient/family teaching  - Consider wound care consult   Outcome: Progressing     Problem: GASTROINTESTINAL - ADULT  Goal: Minimal or absence of nausea and/or vomiting  Description: INTERVENTIONS:  - Administer IV fluids if ordered to ensure adequate hydration  - Maintain NPO status until nausea and vomiting are resolved  - Administer ordered antiemetic medications as needed  - Provide nonpharmacologic comfort measures as appropriate  - Advance diet as tolerated, if ordered  - Consider nutrition services referral to assist patient with adequate nutrition and appropriate food choices  Outcome: Progressing  Goal: Maintains or returns to baseline bowel function  Description: INTERVENTIONS:  - Assess bowel function  - Encourage oral fluids to ensure adequate hydration  - Administer IV fluids if ordered to ensure adequate hydration  - Administer ordered medications as needed  - Encourage mobilization and activity  - Consider nutritional services referral to assist patient with adequate nutrition and appropriate food choices  Outcome: Progressing  Goal: Maintains adequate nutritional intake  Description: INTERVENTIONS:  - Monitor percentage of each meal consumed  - Identify factors contributing to decreased intake, treat as appropriate  - Assist with meals as needed  - Monitor I&O, weight, and lab values if indicated  - Obtain nutrition services referral as needed  Outcome: Progressing     Problem: SKIN/TISSUE INTEGRITY - ADULT  Goal: Incision(s), wounds(s) or drain site(s) healing without S/S of infection  Description: INTERVENTIONS  - Assess and document dressing, incision, wound bed, drain sites and surrounding tissue  - Provide patient and family education  - Perform skin care/dressing changes   Outcome: Progressing     Problem: Nutrition/Hydration-ADULT  Goal: Nutrient/Hydration intake appropriate for improving, restoring or maintaining nutritional needs  Description: Monitor and assess patient's nutrition/hydration status for malnutrition  Collaborate with interdisciplinary team and initiate plan and interventions as ordered  Monitor patient's weight and dietary intake as ordered or per policy  Utilize nutrition screening tool and intervene as necessary  Determine patient's food preferences and provide high-protein, high-caloric foods as appropriate       INTERVENTIONS:  - Monitor oral intake, urinary output, labs, and treatment plans  - Assess nutrition and hydration status and recommend course of action  - Evaluate amount of meals eaten  - Assist patient with eating if necessary   - Allow adequate time for meals  - Recommend/ encourage appropriate diets, oral nutritional supplements, and vitamin/mineral supplements  - Order, calculate, and assess calorie counts as needed  - Recommend, monitor, and adjust tube feedings and TPN/PPN based on assessed needs  - Assess need for intravenous fluids  - Provide specific nutrition/hydration education as appropriate  - Include patient/family/caregiver in decisions related to nutrition  Outcome: Progressing

## 2021-06-30 NOTE — PROGRESS NOTES
Progress Note - Infectious Disease   Marlene Lindsay 62 y o  female MRN: [id number]  Unit/Bed#: E2 -01 Encounter: [id number]      Impression/Plan:    1- Acute hypoxic respiratory failure: This may be postoperative/perioperative aspiration pneumonia   Patient is clinically much improved on antibiotic  O2 support much decreased; and on nasal cannula 3l/min  Sputum culture with mixed respiratory penelope; Flu, RSV, COVID-19 PCR negative  suspect respiratory failure multifactorial   Concern for pulmonary edema in setting of elevated troponin   Concern for mucous plugging with subsequent atelectasis   Concern for pneumonia  sputum culture with mixed respiratory penelope   No MRSA   -  patient finished 1 week course of antibiotic treatment on 06/29/2021  Continue to monitor off antibiotics  - close pulmonary follow-up and monitoring of respiratory status     2- Suspected pneumonia:  Probable perioperative/postoperative aspirate  Patient is clinically much improved   Respiratory culture with mixed penelope  Patient received already 1 week course of antibiotic treatment  She is off antibiotic day 1 today  She is afebrile, hemodynamically stable  - monitor respiratory symptoms     3- complicated diverticulitis:  With recurrent abscess and colocutaneous fistula  Now postop day 9 for open resection of sigmoid colon, left salpingectomy  Operative report reviewed, extensive phlegmonous changes and adhesions noted  Surgical wound with no signs of infection at this time  - frequent abdominal exams  - pain control as per primary service team and General surgery  - close general surgery follow-up     4- suspected COPD:  Patient with chronic smoking  Pulmonary evaluation reviewed, currently off steroids  - consider avoiding systemic corticosteroid treatment in setting of recent extensive surgical procedure as mentioned above    [facility] pulmonary team follow-up     5- nicotine dependence:  Stressed need for complete smoking cessation after hospital discharge      6- troponin elevation:  Possible non MI troponin elevation setting of acute hypoxic respiratory failure  - continue close monitoring of hemodynamics status  - cardiology follow-up     7- leukopenia and thrombocytopenia:  Possibly related to antibiotic use  -follow-up CBC with differential as recommended by Heme-Onc  -hematology follow-up     Discussed with patient in detail regarding the above plan    Okay for discharge from ID standpoint  Will sign off, call back ID service for questions or if change in clinical status    Antibiotics:  Off antibiotic day 1    Subjective:  Patient has no fever, chills, sweats; no nausea, vomiting, diarrhea; no cough, shortness of breath; no pain  No new symptoms  Objective:  Vitals:  Temp:  [96 4 °F (35 8 °C)-97 9 °F (36 6 °C)] 97 9 °F (36 6 °C)  HR:  [75-83] 75  Resp:  [18-20] 20  BP: (115-135)/(72-91) 135/91  SpO2:  [94 %-100 %] 100 %  Temp (24hrs), Av 3 °F (36 3 °C), Min:96 4 °F (35 8 °C), Max:97 9 °F (36 6 °C)  Current: Temperature: 97 9 °F (36 6 °C)    Physical Exam:   General Appearance:  Alert, interactive, nontoxic, no acute distress  Throat: Oropharynx moist without lesions  Lungs:   Clear to auscultation bilaterally; no wheezes, rhonchi or rales; respirations unlabored   Heart:  RRR; no murmur, rub or gallop   Abdomen:   Soft, non-tender, non-distended, positive bowel sounds  Extremities: No clubbing, cyanosis or edema   Skin: No new rashes or lesions  No draining wounds noted         Labs, Imaging, & Other studies:   All pertinent labs and imaging studies were personally reviewed  Results from last 7 days   Lab Units 21  0529 21  0435 21  0520   WBC Thousand/uL 3 90* 3 70* 3 01*   HEMOGLOBIN g/dL 9 6* 10 4* 10 2*   PLATELETS Thousands/uL 70* 105* 103*     Results from last 7 days   Lab Units 21  0529 21  0435 21  0520 21  0459 21  0330 21  0459   SODIUM mmol/L 138 138 138 139 138 139   POTASSIUM mmol/L 2 8* 3 8 3 5 3 4* 4 0 4 1   CHLORIDE mmol/L 103 103 105 106 105 106   CO2 mmol/L 24 26 23 20* 22 22   BUN mg/dL 18 21 21 32* 28* 21   CREATININE mg/dL 0 67 0 83 0 72 0 86 1 06 0 98   EGFR ml/min/1 73sq m 96 77 91 74 57 63   CALCIUM mg/dL 7 8* 8 1* 7 8* 8 1* 8 1* 8 2*   AST U/L  --   --   --  23 32 57*   ALT U/L  --   --   --  20 20 24   ALK PHOS U/L  --   --   --  48 49 54     Results from last 7 days   Lab Units 06/24/21  1304 06/23/21  1609   SPUTUM CULTURE   --  3+ Growth of Candida albicans*  3+ Growth of    GRAM STAIN RESULT   --  1+ Epithelial cells per low power field*  No polys seen*  Rare Gram negative rods*  Rare Gram positive rods*   MRSA CULTURE ONLY  No Methicillin Resistant Staphlyococcus aureus (MRSA) isolated  --      Results from last 7 days   Lab Units 06/26/21  0806 06/25/21  0500   PROCALCITONIN ng/ml 2 06* 2 47*

## 2021-06-30 NOTE — CONSULTS
Consultation - Lu Crane 62 y o  female MRN: [id number]    Unit/Bed#: E2 -01 Encounter: [id number]      Assessment/Plan     Assessment:  In summary, this is a 26-year-old female history of colocutaneous fistula, now status post surgical correction  Nutrition has suffered over the months  This has resulted in folate deficiency which may be the cause of her pancytopenia  Correction is arranged  Continued CBC monitoring Q 2-3 days  If folate deficiency is the correct diagnosis this should improve over the span of a week or so  If not, other considerations would include medication toxicity, underlying bone marrow disorder, etcetera  I reviewed the above considerations with the patient  She voiced understanding and agreement  Plan:  See above  History of Present Illness     HPI: Lu Crane is a 62y o  year old female who presents with Diverticulitis complicated by colocutaneous fistula  September 2020 left lower quadrant abdominal pain  Fistula was detected  This was complicated by abscess  She was treated with antibiotics with some improvement but subsequent recurrence/worsening  June 21, 2021 she underwent sigmoidectomy with anastomosis and colocutaneous fistula takedown  June 25, 2021 admitted to the hospital   WBC 5 5, hemoglobin 12 4, , platelet count 680, 90 neutrophils, 5 lymphocytes, 2 monocytes  Since that time she has developed pancytopenia, WBC 3 9, hemoglobin 9 6, , platelet count 70, mature myeloid predominance  Vitamin B12 404, folate 1 8  CMP showed calcium 8 1, total protein 5 5, albumin 1 6, otherwise normal   Prior to this admission  June 22, 2020 coag panel showed persistent elevation of PT and PTT with incubation  She was on heparin at the time  October 2020 CT of the abdomen pelvis showed right lower lobe pleural base consolidation  Liver and spleen normal   Pelvic abscess  Sigmoid colon wall thickening      Inpatient consult to Hematology  Consult performed by: Clint Eli DO  Consult ordered by: Sanket Bernard MD          Review of Systems    Historical Information   Past Medical History:   Diagnosis Date    Anxiety     Hyperlipidemia      Past Surgical History:   Procedure Laterality Date    FISTULA REPAIR Left     Lower abdomen    WA LAP,SURG,COLECTOMY, PARTIAL, W/ANAST N/A 6/21/2021    Procedure: RESECTION COLON SIGMOID LAPAROSCOPIC (attempted), open resection sigmoid colon, left salpingectomy;  Surgeon: Pili Schneider MD;  Location: [facility] MAIN OR;  Service: General     Social History   Social History     Substance and Sexual Activity   Alcohol Use Yes    Alcohol/week: 2 0 standard drinks    Types: 1 Cans of beer, 1 Shots of liquor per week     Social History     Substance and Sexual Activity   Drug Use Never     Social History     Tobacco Use   Smoking Status Current Every Day Smoker    Packs/day: 1 00    Types: Cigarettes   Smokeless Tobacco Never Used     E-Cigarette/Vaping    E-Cigarette Use Never User      E-Cigarette/Vaping Substances    Nicotine No     THC No     CBD No     Flavoring No       Family History:   Family History   Problem Relation Age of Onset    No Known Problems Mother        Meds/Allergies   all current active meds have been reviewed  No Known Allergies    Objective   Vitals: Blood pressure 135/91, pulse 75, temperature 97 9 °F (36 6 °C), temperature source Temporal, resp  rate 20, height 5' 3" (1 6 m), weight 54 9 kg (121 lb 0 5 oz), SpO2 100 %  Intake/Output Summary (Last 24 hours) at 6/30/2021 1017  Last data filed at 6/30/2021 0546  Gross per 24 hour   Intake 200 ml   Output 600 ml   Net -400 ml     Invasive Devices     Peripheral Intravenous Line            Peripheral IV 06/29/21 Right;Ventral (anterior) Forearm 1 day                Physical Exam    Lab Results: I have personally reviewed pertinent reports  Imaging Studies: I have personally reviewed pertinent reports      EKG, Pathology, and Other Studies: I have personally reviewed pertinent reports  Code Status: Level 1 - Full Code  Advance Directive and Living Will:      Power of :    POLST:      Counseling / Coordination of Care  Total floor / unit time spent today 40 minutes  Greater than 50% of total time was spent with the patient and / or family counseling and / or coordination of care  A description of the counseling / coordination of care: see note

## 2021-06-30 NOTE — RESPIRATORY THERAPY NOTE
Assessment/Plan:    Problem List Items Addressed This Visit        Other    Sacral contusion, initial encounter - Primary     Advised pt to ice the area and take Advil for inflammation and pain as directed  Recommend she use donut pillow while sitting to relieve pressure on sacral spine           Other Visit Diagnoses     Sacral back pain        Relevant Medications    ketorolac (TORADOL) 60 mg/2 mL IM injection 60 mg (Completed)    cyclobenzaprine (FLEXERIL) 5 mg tablet    Post-nasal drip        Recommend flonas and mucinex  RTO if no improvement in 5-7 days        BMI Counseling: Body mass index is 23 34 kg/m²  Discussed the patient's BMI with her  The BMI is in the acceptable range  Patient Instructions   Increase fluid intake as tolerated  Rest and humidification   Continue medications as directed   - Flonase OTC 1-2 sprays each nostril daily PRN post nasal drip   - Mucinex OTC to loosen secretions   Return to office in one week if symptoms persist or worsen     Ice sacral area  Take Flexeril three times a day as needed  Take Advil as directed, always take with food  Call if pain worsens, return to office in 3-4 weeks if pain persists or worsens    Return in about 3 weeks (around 4/2/2019), or if symptoms worsen or fail to improve  Subjective:      Patient ID: Shaniqua Valle is a 38 y o  female  Chief Complaint   Patient presents with   Maxi David is a 38year old female who presents to the office for evaluation of sacral pain s/p fall while snowboarding 2 days ago  Pt reports the pain was initially very bad and she thought she had broken her tail bone  Since that time, pt has been taking advil which has been effective at reducing her pain levels  Reports pain is worse with sitting and moving from sitting to standing positions  Denies any pain with walking, denies incontinence  Pt also concerned about hoarse voice and post nasal drip   Denies fever, chills, chest pain, cough, sob, Home Oxygen Qualifying Test       Patient name: Deborah Correa        : 1960   Date of Test:  2021  Diagnosis:      Home Oxygen Test:    Medicare Guidelines require item(s) 1-5 on all ambulatory patients or 1 and 2 on non-ambulatory patients  1   Baseline SPO2 on Room Air at rest 93 %  2   SPO2 during exercise on Room Air 94 %  During exercise monitor SpO2  If SPO2 increases >=89% with ambulation do not add supplemental             oxygen  If <= 88% on room air add O2 via NC and titrate patient  Patient must be ambulated with O2 and titrated to > 88% with exertion  3   SPO2 on Oxygen at rest 99 % 4 lpm     4   SPO2 during exercise on Oxygen  na% a liter flow of na lpm     5   Exercise performed: Patient walked 100 ft for 6 minutes  []  Supplemental Home Oxygen is indicated  [x]  Client does not qualify for home oxygen        Respiratory Additional Notes-     Danette Current, RT wheezing  The following portions of the patient's history were reviewed and updated as appropriate: allergies, current medications, past family history, past medical history, past social history, past surgical history and problem list     Review of Systems   HENT: Positive for postnasal drip, sore throat and voice change  Negative for congestion and rhinorrhea  Respiratory: Negative for cough, shortness of breath and wheezing  Cardiovascular: Negative for chest pain  Musculoskeletal: Positive for back pain  Negative for arthralgias, neck pain and neck stiffness  Skin: Negative for wound  Current Outpatient Medications   Medication Sig Dispense Refill    cyclobenzaprine (FLEXERIL) 5 mg tablet Take 1 tablet (5 mg total) by mouth 3 (three) times a day as needed for muscle spasms 60 tablet 0    pantoprazole (PROTONIX) 40 mg tablet        No current facility-administered medications for this visit  Objective:    /60   Pulse 74   Temp 98 6 °F (37 °C) (Temporal)   Resp 16   Ht 5' 7" (1 702 m)   Wt 67 6 kg (149 lb)   SpO2 99%   BMI 23 34 kg/m²        Physical Exam   Constitutional: She is oriented to person, place, and time  She appears well-developed and well-nourished  No distress  HENT:   Head: Normocephalic and atraumatic  Right Ear: Tympanic membrane, external ear and ear canal normal  No drainage, swelling or tenderness  No middle ear effusion  Left Ear: Tympanic membrane, external ear and ear canal normal  No drainage, swelling or tenderness  No middle ear effusion  Nose: No mucosal edema, rhinorrhea or sinus tenderness  Right sinus exhibits no maxillary sinus tenderness and no frontal sinus tenderness  Left sinus exhibits no maxillary sinus tenderness and no frontal sinus tenderness  Mouth/Throat: Uvula is midline and mucous membranes are normal  No oropharyngeal exudate, posterior oropharyngeal edema or posterior oropharyngeal erythema     Eyes: Conjunctivae are normal  Right eye exhibits no discharge  Left eye exhibits no discharge  Neck: Normal range of motion  Neck supple  No thyromegaly present  Cardiovascular: Normal rate, regular rhythm and normal heart sounds  Pulmonary/Chest: Effort normal and breath sounds normal  No respiratory distress  She has no decreased breath sounds  She has no wheezes  She has no rhonchi  She has no rales  Abdominal: Soft  Bowel sounds are normal  She exhibits no distension  There is no tenderness  There is no rebound  Musculoskeletal:        Lumbar back: She exhibits no tenderness, no edema and no pain  Back:    Lymphadenopathy:     She has no cervical adenopathy  Neurological: She is alert and oriented to person, place, and time  Skin: Skin is warm and dry  No rash noted  She is not diaphoretic  Psychiatric: She has a normal mood and affect   Her behavior is normal  Thought content normal          JOSE Barone

## 2021-07-01 ENCOUNTER — TELEPHONE (OUTPATIENT)
Dept: SURGICAL ONCOLOGY | Facility: CLINIC | Age: 61
End: 2021-07-01

## 2021-07-01 NOTE — DISCHARGE SUMMARY
[facility]  Discharge- Eliud Ibarra 1960, 62 y o  female MRN: [id number]  Unit/Bed#: E2 -01 Encounter: [id number]  Primary Care Provider: Joni Roland DO   Date and time admitted to hospital: 6/25/2021  4:38 AM    * Acute respiratory failure with hypoxia [facility])  Assessment & Plan  Suspected due to postoperative/perioperative aspiration, treated with BiPAP, supplemental oxygen, IV antibiotics, Pulmonary consult, improved with IV Lasix on 06/28 and presently on 2 L of oxygen - weaned to RA  Patient eval for home 02 - does not require home 02 on DC          Pancytopenia (HCC)  Assessment & Plan  Present WBC of 3 01 > 3 7>3 9, thrombocytopenia 103 > 105>70, hemoglobin of 10 2>10 4>9 6, possible causes include infection, antibiotics  peripheral smear: Hypersegmented neutrophils   Rare stomatocytes; Possible causes include B12, folate deficiency, MDS, drugs as patient has been on IV abx  4T-score is 1 point, low probability of HIT  B12 and folate repletion  Awaiting Hematology consult however patient wants to leave the hospital against medical advice  Discussed with patient that since we do not know why her blood counts are dropping, leaving before further evaluation can result in worsening of her medical condition and or death, especially in the setting of recent surgery  Patient understands these risks and signed [facility] paperwork  Patient has been given an outpatient script for a CBC in 2 days to follow-up with her PCP in outpatient hematology consult      Nicotine dependence  Assessment & Plan  · Continue nicotine patch    Moderate protein-calorie malnutrition ([address] )  Assessment & Plan  Malnutrition Findings:   Adult Malnutrition type: Chronic illness  Adult Degree of Malnutrition: Malnutrition of moderate degree    BMI Findings: Body mass index is 21 44 kg/m²       As per GI surgery, surgical lite diet, advance as GI suggests  Nutrition on consult, appreciate recs    complicated diverticulitis, colocutaneous fistula, s/p sigmoid colon resection  Assessment & Plan  Diverticulitis complicated by abscess, nonhealing myocutaneous fistula despite medication intervention and drain  POD 8 sigmoid colon resection, left salpingectomy with splenic flexure mobilization  Completed 7 days abx      Depression  Assessment & Plan  Continue sertraline 100 mg daily      Medical Problems     Resolved Problems  Date Reviewed: 7/1/2021        Resolved    Hyperlipidemia 6/25/2021     Resolved by  Sharon Roque MD              Discharging Physician / Practitioner: Katerina Albert MD  PCP: Vladislav Núñez DO  Admission Date:   Admission Orders (From admission, onward)     Ordered        06/25/21 0441  Inpatient Admission  Once                   Discharge Date: 06/30/21    Consultations During Hospital Stay:  · General surgery  · Infectious Disease  · ICU  · Cardiology      Hospital Course:   Stefan Kramer is a 62 y o  female patient who originally presented to the hospital on 6/25/2021 at [facility] with complicated sigmoid diverticulitis, underwent sigmoidectomy in fistula takedown, primary anastomosis on 6/25/21  Subsequently, developed acute hypoxic respiratory failure postoperatively requiring transfer to [address] [place] [address] ICU  She was seen in consultation by ID this was thought to be multifactorial secondary to pulmonary edema, mucous plugging with subsequent atelectasis and possible pneumonia  Infectious Disease was consult patient was empirically treated with vanc and cefepime  Cardiology was also consulted for non myocardial infarction troponin elevation secondary to hypoxia  Etiology was less likely felt to be due to heart failure  Patient was weaned from oxygen during this hospitalization  Towards the latter end of patient's hospitalization, she developed a pancytopenia of unclear etiology with worsening anemia and thrombocytopenia without evidence of active bleeding    Limited workup was initiated and revealed patient had folate deficiency and patient was started on folate and B12 supplementation  Hematology consultation was sought for possible other etiologies including MDS however patient wanted to be discharged  She was still on 4 L nasal cannula and required home O2 evaluation  Eval was completed prior to DC and she did not require home O2  It was not felt safe for patient's discharge while her counts were still dropping and etiology was unclear on awaiting Hematology consultation, therefore patient signed out of the hospital against medical advice  She was recommended to follow-up with her PCP with repeat CBC in approximately 2 days  She was also given hematology referral to follow-up as an outpatient  Patient verbalized risks and signed AMA paperwork    Please see above list of diagnoses and related plan for additional information  Condition at Discharge: fair    Discharge Day Visit / Exam:   Subjective:  Patient seen and examined this morning  Appears weak and chronically ill  Patient wants to go home today  She offers no complaints  Vitals: Blood Pressure: 135/91 (06/30/21 0700)  Pulse: 75 (06/30/21 0700)  Temperature: 97 9 °F (36 6 °C) (06/30/21 0700)  Temp Source: Temporal (06/30/21 0700)  Respirations: 20 (06/30/21 0700)  Height: 5' 3" (160 cm) (06/26/21 1713)  Weight - Scale: 54 9 kg (121 lb 0 5 oz) (06/25/21 0450)  SpO2: 97 % (06/30/21 1348)  Exam:   Physical Exam  Vitals and nursing note reviewed  Constitutional:       General: She is not in acute distress  Appearance: She is well-developed  She is ill-appearing  Comments: Thin appearing   HENT:      Head: Normocephalic and atraumatic  Eyes:      Conjunctiva/sclera: Conjunctivae normal    Cardiovascular:      Rate and Rhythm: Normal rate and regular rhythm  Heart sounds: No murmur heard  Pulmonary:      Effort: Pulmonary effort is normal  No respiratory distress        Breath sounds: Normal breath sounds  Comments: NC oxygen  Abdominal:      Palpations: Abdomen is soft  Tenderness: There is no abdominal tenderness  Musculoskeletal:      Cervical back: Neck supple  Skin:     General: Skin is warm and dry  Findings: No bruising  Neurological:      Mental Status: She is alert  Discussion with Family: Updated  () via phone  Discharge instructions/Information to patient and family:   See after visit summary for information provided to patient and family  Provisions for Follow-Up Care:  See after visit summary for information related to follow-up care and any pertinent home health orders  Disposition:   Home    Planned Readmission:  None     Discharge Statement:  I spent 35 minutes discharging the patient  This time was spent on the day of discharge  I had direct contact with the patient on the day of discharge  Greater than 50% of the total time was spent examining patient, answering all patient questions, arranging and discussing plan of care with patient as well as directly providing post-discharge instructions  Additional time then spent on discharge activities  Discharge Medications:  See after visit summary for reconciled discharge medications provided to patient and/or family        **Please Note: This note may have been constructed using a voice recognition system**

## 2021-07-01 NOTE — ASSESSMENT & PLAN NOTE
Present WBC of 3 01 > 3 7>3 9, thrombocytopenia 103 > 105>70, hemoglobin of 10 2>10 4>9 6, possible causes include infection, antibiotics  peripheral smear: Hypersegmented neutrophils   Rare stomatocytes; Possible causes include B12, folate deficiency, MDS, drugs as patient has been on IV abx  4T-score is 1 point, low probability of HIT  B12 and folate repletion  Awaiting Hematology consult however patient wants to leave the hospital against medical advice  Discussed with patient that since we do not know why her blood counts are dropping, leaving before further evaluation can result in worsening of her medical condition and or death, especially in the setting of recent surgery  Patient understands these risks and signed [place] paperwork    Patient has been given an outpatient script for a CBC in 2 days to follow-up with her PCP in outpatient hematology consult

## 2021-07-01 NOTE — ASSESSMENT & PLAN NOTE
Suspected due to postoperative/perioperative aspiration, treated with BiPAP, supplemental oxygen, IV antibiotics, Pulmonary consult, improved with IV Lasix on 06/28 and presently on 2 L of oxygen - weaned to RA    Patient eval for home 02 - does not require home 02 on DC

## 2021-07-01 NOTE — TELEPHONE ENCOUNTER
Called pt in ref  To ref  To hematology  Pt does not want to schedule at this time  She states she has other concerns and I gave her our phone number to please call us when she feels ready to schedule

## 2021-07-01 NOTE — ASSESSMENT & PLAN NOTE
Diverticulitis complicated by abscess, nonhealing myocutaneous fistula despite medication intervention and drain  POD 8 sigmoid colon resection, left salpingectomy with splenic flexure mobilization     Completed 7 days abx

## 2021-07-02 ENCOUNTER — TELEPHONE (OUTPATIENT)
Dept: SURGERY | Facility: CLINIC | Age: 61
End: 2021-07-02

## 2021-07-06 ENCOUNTER — TELEPHONE (OUTPATIENT)
Dept: OTHER | Facility: OTHER | Age: 61
End: 2021-07-06

## 2021-07-13 ENCOUNTER — OFFICE VISIT (OUTPATIENT)
Dept: SURGERY | Facility: CLINIC | Age: 61
End: 2021-07-13

## 2021-07-13 VITALS
TEMPERATURE: 97.4 F | WEIGHT: 121 LBS | HEIGHT: 63 IN | BODY MASS INDEX: 21.44 KG/M2 | HEART RATE: 81 BPM | SYSTOLIC BLOOD PRESSURE: 118 MMHG | DIASTOLIC BLOOD PRESSURE: 68 MMHG

## 2021-07-13 DIAGNOSIS — K63.2 COLOCUTANEOUS FISTULA: Primary | ICD-10-CM

## 2021-07-13 DIAGNOSIS — K57.92 DIVERTICULITIS: ICD-10-CM

## 2021-07-13 PROCEDURE — 99024 POSTOP FOLLOW-UP VISIT: CPT | Performed by: SPECIALIST

## 2021-07-14 NOTE — PROGRESS NOTES
Shaji Jara presents today for her postop visit status post laparoscopic assisted sigmoid colon resection for complicated diverticulitis and a colocutaneous fistula  She had pulmonary issues postop and need to be transferred to [facility] for ICU care  She was never intubated she improved spontaneously was transferred to the floor  She eventually left the hospital AMA  She was given a prescription to have laboratory values checked which she never did  She was due to be seen in the office last week but he is here today  Today in the office she has no real complaints  Her bowels are moving fine  She says she is eating  Physical exam: She looks frail  Quite thin  Abdomen:  Midline incision is healed well  Nylon sutures removed  She has excellent cosmetic result  Left lower quadrant fistula site has some dead tissue which was removed  Some serous fluid is removed  No pus no stool  Underlying muscle looks healthy  Dry sterile dressing is applied  Impression:  Doing well status post sigmoid colon resection for complicated diverticulitis and colocutaneous fistula  The patient needed some rehab but unfortunately left the hospital AMA  Also her labs were abnormal but she says she does not have the money to have them checked since she has lots of bills from her surgery  She did have insurance coverage for the surgery  ?    Plan:  She needs to eat primarily protein  Also potassium containing foods since her potassium was low  She needs to try to increase her activity as her own formal physical therapy  She is discharged the office will bring her back in a few weeks for follow-up visit to see how she is doing

## 2021-08-04 ENCOUNTER — OFFICE VISIT (OUTPATIENT)
Dept: SURGERY | Facility: CLINIC | Age: 61
End: 2021-08-04

## 2021-08-04 VITALS
HEART RATE: 80 BPM | SYSTOLIC BLOOD PRESSURE: 118 MMHG | HEIGHT: 63 IN | WEIGHT: 92 LBS | TEMPERATURE: 98.5 F | DIASTOLIC BLOOD PRESSURE: 78 MMHG | BODY MASS INDEX: 16.3 KG/M2

## 2021-08-04 DIAGNOSIS — E44.1 MILD PROTEIN-CALORIE MALNUTRITION (HCC): ICD-10-CM

## 2021-08-04 DIAGNOSIS — K57.20 PERFORATION OF SIGMOID COLON DUE TO DIVERTICULITIS: ICD-10-CM

## 2021-08-04 DIAGNOSIS — K63.2 COLOCUTANEOUS FISTULA: Primary | ICD-10-CM

## 2021-08-04 PROCEDURE — 99024 POSTOP FOLLOW-UP VISIT: CPT | Performed by: SPECIALIST

## 2021-08-04 NOTE — PROGRESS NOTES
Al[place] Solo presents today for her 2nd postop visit status post laparoscopic assisted sigmoid colon resection for a colocutaneous fistula and severe complicated diverticulitis  She looks quite thin  Her only complaint is that she is tired and tires easily  She says she eats fine  She also says her bowels are moving  She says she started draining some clear fluid from her incision  She also says there is a small bulge in the right side of her abdomen and had questions about it  She says it does not hurt  Her fistula site has healed  This also some conjecture about her weight  Today in the office she weighs 92 lb  Presumably she weighed 121 lb when last seen but this seems heavy and must not be accurate  She was 106 lb preop  Physical exam:  Middle-aged white female cachectic appearing 5 ft 3 in 92 lb  Abdomen :  Flat lower midline incision is noted  Small amount of serous drainage is noted  No pus  No erythema  She has 2 laparoscopic incisions on the right side and what appears to be a bulge  This is reducible consistent with a hernia  Colocutaneous fistula site left lower quadrant is healed  The midline incision is probed with a Q-tip and a seroma is drained  It is clean with peroxide dry sterile dressings applied  Impression:  Doing well status post laparoscopic assisted sigmoid colon resection for complicated diverticulitis and colocutaneous fistula  At least from a GI standpoint  She was encouraged to eat  She says she does  Also take a multiple vitamin  Incisional hernia through a 5 mm port? Plan:  Dressing changes on the midline incision  She has some leftover antibiotics at home from preop and she is told that she may take them  Will deal with the hernia later  We will see her in a week

## 2021-08-11 ENCOUNTER — OFFICE VISIT (OUTPATIENT)
Dept: SURGERY | Facility: CLINIC | Age: 61
End: 2021-08-11

## 2021-08-11 VITALS
TEMPERATURE: 97.7 F | WEIGHT: 92 LBS | DIASTOLIC BLOOD PRESSURE: 72 MMHG | HEIGHT: 63 IN | HEART RATE: 86 BPM | BODY MASS INDEX: 16.3 KG/M2 | SYSTOLIC BLOOD PRESSURE: 118 MMHG

## 2021-08-11 DIAGNOSIS — K57.20 PERFORATION OF SIGMOID COLON DUE TO DIVERTICULITIS: ICD-10-CM

## 2021-08-11 DIAGNOSIS — K63.2 COLOCUTANEOUS FISTULA: Primary | ICD-10-CM

## 2021-08-11 PROCEDURE — 99024 POSTOP FOLLOW-UP VISIT: CPT | Performed by: SPECIALIST

## 2021-08-11 NOTE — PROGRESS NOTES
Austyn Lawrence presents today for follow-up visit status post laparoscopic assisted sigmoid colon resection for complicated diverticulitis and colocutaneous fistula  She apparently is eating  She has good GI function  Weight stable at 92 lb  When seen last week she had some serous drainage from the incision  No purulent drainage  No pus  No blood  Nontender  Today in the office she says it is significantly less drainage  Physical exam:  Middle-aged white female cachectic appearing awake alert no distress     Abdomen incision is healing well  Small opening toward the inferior aspect  Minimal serosanguineous drainage is noted  Probed with a Q-tip and peroxide  Dry sterile dressings applied  Impression:   Seroma improved  Overall improving from her surgery  Plan:  Return 1 month  Call problems

## (undated) DEVICE — SUT SILK 2-0 SH 30 IN K833H

## (undated) DEVICE — SUT MONOCRYL 4-0 PS-2 27 IN Y426H

## (undated) DEVICE — NEEDLE BLUNT 18 G X 1 1/2IN

## (undated) DEVICE — INTENDED FOR TISSUE SEPARATION, AND OTHER PROCEDURES THAT REQUIRE A SHARP SURGICAL BLADE TO PUNCTURE OR CUT.: Brand: BARD-PARKER SAFETY BLADES SIZE 15, STERILE

## (undated) DEVICE — TROCAR: Brand: KII FIOS FIRST ENTRY

## (undated) DEVICE — CONTOUR CURVED CUTTER STAPLER: Brand: CONTOUR

## (undated) DEVICE — GAUZE SPONGES,USP TYPE VII GAUZE, 12 PLY: Brand: CURITY

## (undated) DEVICE — CONTOUR CURVED CUTTER STAPLER RELOAD: Brand: CONTOUR

## (undated) DEVICE — GLOVE SRG BIOGEL 6.5

## (undated) DEVICE — SUT VICRYL 0 UR-6 27 IN J603H

## (undated) DEVICE — MAYO STAND COVER: Brand: CONVERTORS

## (undated) DEVICE — SCD SEQUENTIAL COMPRESSION COMFORT SLEEVE MEDIUM KNEE LENGTH: Brand: KENDALL SCD

## (undated) DEVICE — SPONGE LAP 18 X 4 IN STRL RFD

## (undated) DEVICE — Device

## (undated) DEVICE — DRAPE SHEET THREE QUARTER

## (undated) DEVICE — POOLE SUCTION HANDLE W/TUBING: Brand: CARDINAL HEALTH

## (undated) DEVICE — CHLORAPREP HI-LITE 26ML ORANGE

## (undated) DEVICE — ELECTRODE BLADE E-Z CLEAN 6.5IN -0014

## (undated) DEVICE — SUT PDS II 1 CT-1 27 IN Z341H

## (undated) DEVICE — TRAY FOLEY 16FR URIMETER SURESTEP

## (undated) DEVICE — ECHELON CIRCULAR POWERED STAPLER: Brand: ECHELON CIRCULAR

## (undated) DEVICE — PENCIL ELECTROSURG E-Z CLEAN -0035H

## (undated) DEVICE — TROCAR: Brand: KII® SLEEVE

## (undated) DEVICE — HARMONIC 1100 SHEARS, 36CM SHAFT LENGTH: Brand: HARMONIC

## (undated) DEVICE — STERILE POLYISOPRENE POWDER-FREE SURGICAL GLOVES WITH EMOLLIENT COATING: Brand: PROTEXIS

## (undated) DEVICE — ABDOMINAL PAD: Brand: DERMACEA

## (undated) DEVICE — ENDOPATH PNEUMONEEDLE INSUFFLATION NEEDLES WITH LUER LOCK CONNECTORS 120MM: Brand: ENDOPATH

## (undated) DEVICE — SWABSTCK, BENZOIN TINCTURE, 1/PK, STRL: Brand: APLICARE

## (undated) DEVICE — STANDARD SURGICAL GOWN, L: Brand: CONVERTORS

## (undated) DEVICE — SWITCH BLADE TIP CURVED METZ

## (undated) DEVICE — STERILE POLYISOPRENE POWDER-FREE SURGICAL GLOVES: Brand: PROTEXIS

## (undated) DEVICE — 3M™ STERI-STRIP™ REINFORCED ADHESIVE SKIN CLOSURES, R1547, 1/2 IN X 4 IN (12 MM X 100 MM), 6 STRIPS/ENVELOPE: Brand: 3M™ STERI-STRIP™

## (undated) DEVICE — IRRIG ENDO FLO TUBING

## (undated) DEVICE — ASTOUND STANDARD SURGICAL GOWN, XL: Brand: CONVERTORS

## (undated) DEVICE — SUT VICRYL 1 CT-1 27 IN J261H

## (undated) DEVICE — GLOVE INDICATOR PI UNDERGLOVE SZ 6.5 BLUE

## (undated) DEVICE — HARMONIC 1100 SHEARS, 20CM SHAFT LENGTH: Brand: HARMONIC

## (undated) DEVICE — SUT VICRYL 0 CT-1 27 IN J340H

## (undated) DEVICE — ALLENTOWN LAP CHOLE APP PACK: Brand: CARDINAL HEALTH

## (undated) DEVICE — NEEDLE 25G X 1 1/2

## (undated) DEVICE — SYRINGE 30ML LL

## (undated) DEVICE — SPONGE STICK WITH PVP-I: Brand: KENDALL

## (undated) DEVICE — TUBING SET W. FILTER, GAS FLOW 30 L/MIN: Brand: N.A.